# Patient Record
Sex: MALE | Race: OTHER | HISPANIC OR LATINO | URBAN - METROPOLITAN AREA
[De-identification: names, ages, dates, MRNs, and addresses within clinical notes are randomized per-mention and may not be internally consistent; named-entity substitution may affect disease eponyms.]

---

## 2017-05-16 ENCOUNTER — EMERGENCY (EMERGENCY)
Facility: HOSPITAL | Age: 27
LOS: 1 days | Discharge: HOME | End: 2017-05-16
Attending: EMERGENCY MEDICINE
Payer: COMMERCIAL

## 2017-05-16 VITALS
OXYGEN SATURATION: 98 % | TEMPERATURE: 99 F | WEIGHT: 154.98 LBS | HEIGHT: 63 IN | DIASTOLIC BLOOD PRESSURE: 133 MMHG | HEART RATE: 70 BPM | RESPIRATION RATE: 18 BRPM | SYSTOLIC BLOOD PRESSURE: 195 MMHG
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
## 2017-05-16 DIAGNOSIS — Z79.899 OTHER LONG TERM (CURRENT) DRUG THERAPY: ICD-10-CM

## 2017-05-16 DIAGNOSIS — S13.9XXA SPRAIN OF JOINTS AND LIGAMENTS OF UNSPECIFIED PARTS OF NECK, INITIAL ENCOUNTER: ICD-10-CM

## 2017-05-16 DIAGNOSIS — M54.2 CERVICALGIA: ICD-10-CM
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
## 2017-05-16 DIAGNOSIS — Z95.0 PRESENCE OF CARDIAC PACEMAKER: ICD-10-CM

## 2017-05-16 DIAGNOSIS — I10 ESSENTIAL (PRIMARY) HYPERTENSION: ICD-10-CM

## 2017-05-16 PROCEDURE — 99285 EMERGENCY DEPT VISIT HI MDM: CPT | Mod: 25

## 2017-05-16 PROCEDURE — 99053 MED SERV 10PM-8AM 24 HR FAC: CPT

## 2017-05-16 RX ORDER — SODIUM CHLORIDE 9 MG/ML
3 INJECTION INTRAMUSCULAR; INTRAVENOUS; SUBCUTANEOUS ONCE
Qty: 0 | Refills: 0 | Status: COMPLETED | OUTPATIENT
Start: 2017-05-16 | End: 2017-05-16

## 2017-05-16 RX ORDER — KETOROLAC TROMETHAMINE 30 MG/ML
15 SYRINGE (ML) INJECTION ONCE
Qty: 0 | Refills: 0 | Status: DISCONTINUED | OUTPATIENT
Start: 2017-05-16 | End: 2017-05-16

## 2017-05-16 RX ORDER — LABETALOL HCL 100 MG
20 TABLET ORAL ONCE
Qty: 0 | Refills: 0 | Status: COMPLETED | OUTPATIENT
Start: 2017-05-16 | End: 2017-05-16
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
## 2017-05-16 NOTE — ED PROVIDER NOTE - PROGRESS NOTE DETAILS
CT results and labs as noted.  BP improved with meds and pt feels well for d/c.  Pt instructed to take his meds as instructed

## 2017-05-16 NOTE — ED PROVIDER NOTE - NS ED MD SCRIBE ATTENDING SCRIBE SECTIONS
REVIEW OF SYSTEMS/PAST MEDICAL/SURGICAL/SOCIAL HISTORY/PHYSICAL EXAM/HISTORY OF PRESENT ILLNESS/INTAKE ASSESSMENT/SCREENINGS/DISPOSITION/HIV/VITAL SIGNS( Pullset)

## 2017-05-16 NOTE — ED PROVIDER NOTE - PHYSICAL EXAMINATION
c- collar does not fit pt's neck properly  secondary to pt having a chronic physical neck deformity.

## 2017-05-16 NOTE — ED PROVIDER NOTE - MUSCULOSKELETAL MINIMAL EXAM
+neck pain +pacemaker and defibrillator to chest wall. difficult to assess neck secondary to baseline neck deformity. No step off, no midline tenderness of neck. pelvis is intact. no francheska tenderness

## 2017-05-17 VITALS — DIASTOLIC BLOOD PRESSURE: 89 MMHG | HEART RATE: 62 BPM | SYSTOLIC BLOOD PRESSURE: 143 MMHG | RESPIRATION RATE: 18 BRPM

## 2017-05-17 LAB
ALBUMIN SERPL ELPH-MCNC: 4.3 G/DL — SIGNIFICANT CHANGE UP (ref 3.3–5.2)
ALP SERPL-CCNC: 38 U/L — LOW (ref 40–120)
ALT FLD-CCNC: 19 U/L — SIGNIFICANT CHANGE UP
ANION GAP SERPL CALC-SCNC: 13 MMOL/L — SIGNIFICANT CHANGE UP (ref 5–17)
AST SERPL-CCNC: 18 U/L — SIGNIFICANT CHANGE UP
BASOPHILS # BLD AUTO: 0 K/UL — SIGNIFICANT CHANGE UP (ref 0–0.2)
BASOPHILS NFR BLD AUTO: 0.3 % — SIGNIFICANT CHANGE UP (ref 0–2)
BILIRUB SERPL-MCNC: 0.4 MG/DL — SIGNIFICANT CHANGE UP (ref 0.4–2)
BUN SERPL-MCNC: 17 MG/DL — SIGNIFICANT CHANGE UP (ref 8–20)
CALCIUM SERPL-MCNC: 9.2 MG/DL — SIGNIFICANT CHANGE UP (ref 8.6–10.2)
CHLORIDE SERPL-SCNC: 99 MMOL/L — SIGNIFICANT CHANGE UP (ref 98–107)
CO2 SERPL-SCNC: 28 MMOL/L — SIGNIFICANT CHANGE UP (ref 22–29)
CREAT SERPL-MCNC: 0.91 MG/DL — SIGNIFICANT CHANGE UP (ref 0.5–1.3)
EOSINOPHIL # BLD AUTO: 0.1 K/UL — SIGNIFICANT CHANGE UP (ref 0–0.5)
EOSINOPHIL NFR BLD AUTO: 1.4 % — SIGNIFICANT CHANGE UP (ref 0–5)
GLUCOSE SERPL-MCNC: 108 MG/DL — SIGNIFICANT CHANGE UP (ref 70–115)
HCT VFR BLD CALC: 44.5 % — SIGNIFICANT CHANGE UP (ref 42–52)
HGB BLD-MCNC: 15 G/DL — SIGNIFICANT CHANGE UP (ref 14–18)
LYMPHOCYTES # BLD AUTO: 2.9 K/UL — SIGNIFICANT CHANGE UP (ref 1–4.8)
LYMPHOCYTES # BLD AUTO: 41.8 % — SIGNIFICANT CHANGE UP (ref 20–55)
MCHC RBC-ENTMCNC: 28.1 PG — SIGNIFICANT CHANGE UP (ref 27–31)
MCHC RBC-ENTMCNC: 33.7 G/DL — SIGNIFICANT CHANGE UP (ref 32–36)
MCV RBC AUTO: 83.5 FL — SIGNIFICANT CHANGE UP (ref 80–94)
MONOCYTES # BLD AUTO: 0.5 K/UL — SIGNIFICANT CHANGE UP (ref 0–0.8)
MONOCYTES NFR BLD AUTO: 6.9 % — SIGNIFICANT CHANGE UP (ref 3–10)
NEUTROPHILS # BLD AUTO: 3.4 K/UL — SIGNIFICANT CHANGE UP (ref 1.8–8)
NEUTROPHILS NFR BLD AUTO: 49.5 % — SIGNIFICANT CHANGE UP (ref 37–73)
PLATELET # BLD AUTO: 224 K/UL — SIGNIFICANT CHANGE UP (ref 150–400)
POTASSIUM SERPL-MCNC: 3.9 MMOL/L — SIGNIFICANT CHANGE UP (ref 3.5–5.3)
POTASSIUM SERPL-SCNC: 3.9 MMOL/L — SIGNIFICANT CHANGE UP (ref 3.5–5.3)
PROT SERPL-MCNC: 7.9 G/DL — SIGNIFICANT CHANGE UP (ref 6.6–8.7)
RBC # BLD: 5.33 M/UL — SIGNIFICANT CHANGE UP (ref 4.6–6.2)
RBC # FLD: 14 % — SIGNIFICANT CHANGE UP (ref 11–15.6)
SODIUM SERPL-SCNC: 140 MMOL/L — SIGNIFICANT CHANGE UP (ref 135–145)
WBC # BLD: 7 K/UL — SIGNIFICANT CHANGE UP (ref 4.8–10.8)
WBC # FLD AUTO: 7 K/UL — SIGNIFICANT CHANGE UP (ref 4.8–10.8)

## 2017-05-17 PROCEDURE — 96374 THER/PROPH/DIAG INJ IV PUSH: CPT

## 2017-05-17 PROCEDURE — 72125 CT NECK SPINE W/O DYE: CPT

## 2017-05-17 PROCEDURE — 85027 COMPLETE CBC AUTOMATED: CPT

## 2017-05-17 PROCEDURE — 96375 TX/PRO/DX INJ NEW DRUG ADDON: CPT

## 2017-05-17 PROCEDURE — 99284 EMERGENCY DEPT VISIT MOD MDM: CPT | Mod: 25

## 2017-05-17 PROCEDURE — 72125 CT NECK SPINE W/O DYE: CPT | Mod: 26

## 2017-05-17 PROCEDURE — 80053 COMPREHEN METABOLIC PANEL: CPT

## 2017-05-17 RX ORDER — HYDRALAZINE HCL 50 MG
10 TABLET ORAL ONCE
Qty: 0 | Refills: 0 | Status: COMPLETED | OUTPATIENT
Start: 2017-05-17 | End: 2017-05-17

## 2017-05-17 RX ORDER — METHOCARBAMOL 500 MG/1
1 TABLET, FILM COATED ORAL
Qty: 20 | Refills: 0 | OUTPATIENT
Start: 2017-05-17 | End: 2017-05-22

## 2017-05-17 RX ORDER — IBUPROFEN 200 MG
1 TABLET ORAL
Qty: 40 | Refills: 0 | OUTPATIENT
Start: 2017-05-17 | End: 2017-05-27

## 2017-05-17 RX ADMIN — Medication 20 MILLIGRAM(S): at 00:45

## 2017-05-17 RX ADMIN — Medication 0.2 MILLIGRAM(S): at 03:28

## 2017-05-17 RX ADMIN — SODIUM CHLORIDE 3 MILLILITER(S): 9 INJECTION INTRAMUSCULAR; INTRAVENOUS; SUBCUTANEOUS at 00:42

## 2017-05-17 RX ADMIN — Medication 15 MILLIGRAM(S): at 00:45

## 2017-05-17 RX ADMIN — Medication 10 MILLIGRAM(S): at 03:28

## 2017-05-17 NOTE — ED ADULT NURSE REASSESSMENT NOTE - NS ED NURSE REASSESS COMMENT FT1
patient non symptomatic, elevated BP, MDA multiple meds ordered, and given [id number] well, will continue to monitor for discharge
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
## 2017-05-25 ENCOUNTER — APPOINTMENT (OUTPATIENT)
Dept: CARDIOLOGY | Facility: CLINIC | Age: 27
End: 2017-05-25

## 2017-05-25 VITALS
DIASTOLIC BLOOD PRESSURE: 89 MMHG | SYSTOLIC BLOOD PRESSURE: 144 MMHG | WEIGHT: 156 LBS | HEIGHT: 62 IN | OXYGEN SATURATION: 98 % | BODY MASS INDEX: 28.71 KG/M2 | HEART RATE: 62 BPM

## 2017-05-28 ENCOUNTER — NON-APPOINTMENT (OUTPATIENT)
Age: 27
End: 2017-05-28

## 2017-05-31 ENCOUNTER — APPOINTMENT (OUTPATIENT)
Dept: CARDIOLOGY | Facility: CLINIC | Age: 27
End: 2017-05-31

## 2017-06-01 ENCOUNTER — APPOINTMENT (OUTPATIENT)
Dept: CARDIOLOGY | Facility: CLINIC | Age: 27
End: 2017-06-01

## 2017-07-27 ENCOUNTER — APPOINTMENT (OUTPATIENT)
Dept: CARDIOLOGY | Facility: CLINIC | Age: 27
End: 2017-07-27
Payer: MEDICAID

## 2017-07-27 VITALS — DIASTOLIC BLOOD PRESSURE: 102 MMHG | SYSTOLIC BLOOD PRESSURE: 146 MMHG

## 2017-07-27 VITALS — HEIGHT: 62 IN | BODY MASS INDEX: 28.16 KG/M2 | WEIGHT: 153 LBS

## 2017-07-27 VITALS — DIASTOLIC BLOOD PRESSURE: 119 MMHG | SYSTOLIC BLOOD PRESSURE: 175 MMHG

## 2017-07-27 VITALS — HEART RATE: 77 BPM | OXYGEN SATURATION: 93 % | DIASTOLIC BLOOD PRESSURE: 103 MMHG | SYSTOLIC BLOOD PRESSURE: 161 MMHG

## 2017-07-27 VITALS — SYSTOLIC BLOOD PRESSURE: 170 MMHG | DIASTOLIC BLOOD PRESSURE: 110 MMHG

## 2017-07-27 PROCEDURE — 93000 ELECTROCARDIOGRAM COMPLETE: CPT

## 2017-07-27 PROCEDURE — 99214 OFFICE O/P EST MOD 30 MIN: CPT | Mod: 25

## 2017-07-27 RX ORDER — WARFARIN 1 MG/1
1 TABLET ORAL
Qty: 30 | Refills: 0 | Status: DISCONTINUED | COMMUNITY
Start: 2017-07-07

## 2017-09-03 ENCOUNTER — NON-APPOINTMENT (OUTPATIENT)
Age: 27
End: 2017-09-03

## 2017-11-30 ENCOUNTER — INPATIENT (INPATIENT)
Facility: HOSPITAL | Age: 27
LOS: 2 days | Discharge: HOME | DRG: 193 | End: 2017-12-03
Attending: HOSPITALIST | Admitting: HOSPITALIST
Payer: MEDICAID

## 2017-11-30 VITALS
RESPIRATION RATE: 18 BRPM | HEART RATE: 73 BPM | SYSTOLIC BLOOD PRESSURE: 146 MMHG | HEIGHT: 62 IN | TEMPERATURE: 98 F | WEIGHT: 160.06 LBS | DIASTOLIC BLOOD PRESSURE: 94 MMHG | OXYGEN SATURATION: 92 %

## 2017-11-30 DIAGNOSIS — J18.9 PNEUMONIA, UNSPECIFIED ORGANISM: ICD-10-CM

## 2017-11-30 DIAGNOSIS — J96.01 ACUTE RESPIRATORY FAILURE WITH HYPOXIA: ICD-10-CM

## 2017-11-30 DIAGNOSIS — I10 ESSENTIAL (PRIMARY) HYPERTENSION: ICD-10-CM

## 2017-11-30 DIAGNOSIS — I48.91 UNSPECIFIED ATRIAL FIBRILLATION: ICD-10-CM

## 2017-11-30 DIAGNOSIS — J18.1 LOBAR PNEUMONIA, UNSPECIFIED ORGANISM: ICD-10-CM

## 2017-11-30 DIAGNOSIS — Z98.890 OTHER SPECIFIED POSTPROCEDURAL STATES: Chronic | ICD-10-CM

## 2017-11-30 DIAGNOSIS — R07.89 OTHER CHEST PAIN: ICD-10-CM

## 2017-11-30 LAB
ALBUMIN SERPL ELPH-MCNC: 4.3 G/DL — SIGNIFICANT CHANGE UP (ref 3.3–5.2)
ALP SERPL-CCNC: 47 U/L — SIGNIFICANT CHANGE UP (ref 40–120)
ALT FLD-CCNC: 15 U/L — SIGNIFICANT CHANGE UP
ANION GAP SERPL CALC-SCNC: 15 MMOL/L — SIGNIFICANT CHANGE UP (ref 5–17)
APTT BLD: 56.5 SEC — HIGH (ref 27.5–37.4)
AST SERPL-CCNC: 20 U/L — SIGNIFICANT CHANGE UP
BASOPHILS # BLD AUTO: 0 K/UL — SIGNIFICANT CHANGE UP (ref 0–0.2)
BASOPHILS NFR BLD AUTO: 0.2 % — SIGNIFICANT CHANGE UP (ref 0–2)
BILIRUB SERPL-MCNC: 0.5 MG/DL — SIGNIFICANT CHANGE UP (ref 0.4–2)
BLOOD GAS COMMENTS ARTERIAL: SIGNIFICANT CHANGE UP
BUN SERPL-MCNC: 14 MG/DL — SIGNIFICANT CHANGE UP (ref 8–20)
CALCIUM SERPL-MCNC: 9.3 MG/DL — SIGNIFICANT CHANGE UP (ref 8.6–10.2)
CHLORIDE SERPL-SCNC: 97 MMOL/L — LOW (ref 98–107)
CK MB CFR SERPL CALC: 2.1 NG/ML — SIGNIFICANT CHANGE UP (ref 0–6.7)
CK SERPL-CCNC: 164 U/L — SIGNIFICANT CHANGE UP (ref 30–200)
CO2 SERPL-SCNC: 26 MMOL/L — SIGNIFICANT CHANGE UP (ref 22–29)
CREAT SERPL-MCNC: 0.92 MG/DL — SIGNIFICANT CHANGE UP (ref 0.5–1.3)
EOSINOPHIL # BLD AUTO: 0.1 K/UL — SIGNIFICANT CHANGE UP (ref 0–0.5)
EOSINOPHIL NFR BLD AUTO: 1.6 % — SIGNIFICANT CHANGE UP (ref 0–5)
GAS PNL BLDA: SIGNIFICANT CHANGE UP
GAS PNL BLDA: SIGNIFICANT CHANGE UP
GLUCOSE SERPL-MCNC: 87 MG/DL — SIGNIFICANT CHANGE UP (ref 70–115)
HCO3 BLDA-SCNC: 25 MMOL/L — SIGNIFICANT CHANGE UP (ref 20–26)
HCO3 BLDA-SCNC: 25 MMOL/L — SIGNIFICANT CHANGE UP (ref 20–26)
HCT VFR BLD CALC: 44 % — SIGNIFICANT CHANGE UP (ref 42–52)
HGB BLD-MCNC: 15.6 G/DL — SIGNIFICANT CHANGE UP (ref 14–18)
HOROWITZ INDEX BLDA+IHG-RTO: SIGNIFICANT CHANGE UP
INR BLD: 3.23 RATIO — HIGH (ref 0.88–1.16)
LACTATE BLDV-MCNC: 0.7 MMOL/L — SIGNIFICANT CHANGE UP (ref 0.5–2)
LYMPHOCYTES # BLD AUTO: 2.4 K/UL — SIGNIFICANT CHANGE UP (ref 1–4.8)
LYMPHOCYTES # BLD AUTO: 30.3 % — SIGNIFICANT CHANGE UP (ref 20–55)
MCHC RBC-ENTMCNC: 29.9 PG — SIGNIFICANT CHANGE UP (ref 27–31)
MCHC RBC-ENTMCNC: 35.5 G/DL — SIGNIFICANT CHANGE UP (ref 32–36)
MCV RBC AUTO: 84.5 FL — SIGNIFICANT CHANGE UP (ref 80–94)
MONOCYTES # BLD AUTO: 0.7 K/UL — SIGNIFICANT CHANGE UP (ref 0–0.8)
MONOCYTES NFR BLD AUTO: 8.5 % — SIGNIFICANT CHANGE UP (ref 3–10)
NEUTROPHILS # BLD AUTO: 4.8 K/UL — SIGNIFICANT CHANGE UP (ref 1.8–8)
NEUTROPHILS NFR BLD AUTO: 59.2 % — SIGNIFICANT CHANGE UP (ref 37–73)
PCO2 BLDA: 40 MMHG — SIGNIFICANT CHANGE UP (ref 35–45)
PCO2 BLDA: 56 MMHG — HIGH (ref 35–45)
PH BLDA: 7.33 — LOW (ref 7.35–7.45)
PH BLDA: 7.41 — SIGNIFICANT CHANGE UP (ref 7.35–7.45)
PLATELET # BLD AUTO: 265 K/UL — SIGNIFICANT CHANGE UP (ref 150–400)
PO2 BLDA: 42 MMHG — CRITICAL LOW (ref 83–108)
PO2 BLDA: 68 MMHG — LOW (ref 83–108)
POTASSIUM SERPL-MCNC: 4.1 MMOL/L — SIGNIFICANT CHANGE UP (ref 3.5–5.3)
POTASSIUM SERPL-SCNC: 4.1 MMOL/L — SIGNIFICANT CHANGE UP (ref 3.5–5.3)
PROT SERPL-MCNC: 8.2 G/DL — SIGNIFICANT CHANGE UP (ref 6.6–8.7)
PROTHROM AB SERPL-ACNC: 36.4 SEC — HIGH (ref 9.8–12.7)
RAPID RVP RESULT: SIGNIFICANT CHANGE UP
RBC # BLD: 5.21 M/UL — SIGNIFICANT CHANGE UP (ref 4.6–6.2)
RBC # FLD: 13.2 % — SIGNIFICANT CHANGE UP (ref 11–15.6)
SAO2 % BLDA: 74 % — LOW (ref 95–99)
SAO2 % BLDA: 94 % — LOW (ref 95–99)
SODIUM SERPL-SCNC: 138 MMOL/L — SIGNIFICANT CHANGE UP (ref 135–145)
TROPONIN T SERPL-MCNC: <0.01 NG/ML — SIGNIFICANT CHANGE UP (ref 0–0.06)
TROPONIN T SERPL-MCNC: <0.01 NG/ML — SIGNIFICANT CHANGE UP (ref 0–0.06)
WBC # BLD: 8 K/UL — SIGNIFICANT CHANGE UP (ref 4.8–10.8)
WBC # FLD AUTO: 8 K/UL — SIGNIFICANT CHANGE UP (ref 4.8–10.8)

## 2017-11-30 PROCEDURE — 99285 EMERGENCY DEPT VISIT HI MDM: CPT

## 2017-11-30 PROCEDURE — 99223 1ST HOSP IP/OBS HIGH 75: CPT | Mod: GC

## 2017-11-30 PROCEDURE — 71010: CPT | Mod: 26

## 2017-11-30 RX ORDER — WARFARIN SODIUM 2.5 MG/1
0 TABLET ORAL
Qty: 0 | Refills: 0 | COMMUNITY

## 2017-11-30 RX ORDER — AZITHROMYCIN 500 MG/1
500 TABLET, FILM COATED ORAL ONCE
Qty: 0 | Refills: 0 | Status: COMPLETED | OUTPATIENT
Start: 2017-11-30 | End: 2017-11-30

## 2017-11-30 RX ORDER — CEFTRIAXONE 500 MG/1
1 INJECTION, POWDER, FOR SOLUTION INTRAMUSCULAR; INTRAVENOUS ONCE
Qty: 0 | Refills: 0 | Status: COMPLETED | OUTPATIENT
Start: 2017-11-30 | End: 2017-11-30

## 2017-11-30 RX ORDER — ALBUTEROL 90 UG/1
2.5 AEROSOL, METERED ORAL EVERY 6 HOURS
Qty: 0 | Refills: 0 | Status: DISCONTINUED | OUTPATIENT
Start: 2017-11-30 | End: 2017-12-03

## 2017-11-30 RX ORDER — ACETAMINOPHEN 500 MG
650 TABLET ORAL EVERY 6 HOURS
Qty: 0 | Refills: 0 | Status: DISCONTINUED | OUTPATIENT
Start: 2017-11-30 | End: 2017-12-03

## 2017-11-30 RX ORDER — CARVEDILOL PHOSPHATE 80 MG/1
6.25 CAPSULE, EXTENDED RELEASE ORAL EVERY 12 HOURS
Qty: 0 | Refills: 0 | Status: DISCONTINUED | OUTPATIENT
Start: 2017-11-30 | End: 2017-12-01

## 2017-11-30 RX ORDER — ASPIRIN/CALCIUM CARB/MAGNESIUM 324 MG
81 TABLET ORAL DAILY
Qty: 0 | Refills: 0 | Status: DISCONTINUED | OUTPATIENT
Start: 2017-11-30 | End: 2017-12-03

## 2017-11-30 RX ADMIN — CARVEDILOL PHOSPHATE 6.25 MILLIGRAM(S): 80 CAPSULE, EXTENDED RELEASE ORAL at 23:21

## 2017-11-30 RX ADMIN — CEFTRIAXONE 100 GRAM(S): 500 INJECTION, POWDER, FOR SOLUTION INTRAMUSCULAR; INTRAVENOUS at 19:29

## 2017-11-30 RX ADMIN — AZITHROMYCIN 255 MILLIGRAM(S): 500 TABLET, FILM COATED ORAL at 19:30

## 2017-11-30 RX ADMIN — Medication 81 MILLIGRAM(S): at 23:21

## 2017-11-30 NOTE — H&P ADULT - NSHPPHYSICALEXAM_GEN_ALL_CORE
Vital Signs Last 24 Hrs  T(C): 36.4 (30 Nov 2017 21:12), Max: 36.4 (30 Nov 2017 13:49)  T(F): 97.5 (30 Nov 2017 21:12), Max: 97.5 (30 Nov 2017 13:49)  HR: 77 (30 Nov 2017 21:12) (73 - 77)  BP: 154/102 (30 Nov 2017 21:12) (146/94 - 154/112)  RR: 18 (30 Nov 2017 21:12) (18 - 18)  SpO2: 99% (30 Nov 2017 21:12) (92% - 99%)      PHYSICAL EXAM: Vital signs reviewed.  GENERAL: Appears well developed, well nourished alert and cooperative.  HEENT: Head; normocephalic, atraumatic, PERRLA, EOMI  NECK: Supple, no JVD or carotid bruit or thyromegaly.  CARDIOVASCULAR: Normal S1 and S2, No murmur, RRR. No edema  RESPIRATORY: No rales, rhonchi or wheeze. Normal breath sounds bilaterally.  GASTROINTESTINAL: Soft, nontender without mass or organomegaly. Nl BS  EXTREMITIES: No clubbing, cyanosis or edema. No calf tenderness. Distal pulses wnl.   MUSCULOSKELETAL: 5/5 muscle strength in UE and LE.   SKIN: warm and dry with normal turgor.  NEURO: Alert/oriented x 3/normal motor exam. CN 2-12 intact. No pathologic reflexes.    PSYCH: normal affect. Vital Signs Last 24 Hrs  T(C): 36.4 (30 Nov 2017 21:12), Max: 36.4 (30 Nov 2017 13:49)  T(F): 97.5 (30 Nov 2017 21:12), Max: 97.5 (30 Nov 2017 13:49)  HR: 77 (30 Nov 2017 21:12) (73 - 77)  BP: 154/102 (30 Nov 2017 21:12) (146/94 - 154/112)  RR: 18 (30 Nov 2017 21:12) (18 - 18)  SpO2: 99% (30 Nov 2017 21:12) (92% - 99%)      PHYSICAL EXAM: Vital signs reviewed.  GENERAL: Appears well developed.   HEENT: PERRLA, EOMI. Facial dyssymmetry noted. Left iris with white patchiness. + Dysarthria  NECK: Supple, no JVD. Tracheostomy site now closed with scar noted.  CARDIOVASCULAR: S1 and S2, irregular, No murmur. No edema. AICD in place over left chest.   RESPIRATORY: No rales, rhonchi or wheeze. Normal breath sounds bilaterally.  GASTROINTESTINAL: Soft, nontender. Mid circular abdominal scar noted.   EXTREMITIES: No cyanosis or edema. No calf tenderness. Distal pulses wnl.   MUSCULOSKELETAL: 5/5 muscle strength in UE and LE.   SKIN: warm and dry with normal turgor.  NEURO: Alert/oriented x 3/normal motor exam. + dysarthria. 5/5 muscle strength. No loss of sensation  PSYCH: normal affect.

## 2017-11-30 NOTE — ED PROVIDER NOTE - PMH
Atrial fibrillation, unspecified type    CHF (congestive heart failure)    CVA (cerebral vascular accident)    HTN (hypertension)    TIA (transient ischemic attack)

## 2017-11-30 NOTE — H&P ADULT - PROBLEM SELECTOR PLAN 3
-likely pleuritic in nature given active pneumonia however given exstensive cardiac history cannot exclude cardiac etiology at this time.   -Will admit to monitored bed, will downgrade when appropriate  -EKG reviewed, no previous EKG available, T wave inversions evident in inferolateral leads  -serial troponin, first set <0.01 -likely pleuritic in nature given active pneumonia however given exstensive cardiac history cannot exclude cardiac etiology at this time.   -Will admit to monitored bed, will downgrade when appropriate  -EKG reviewed, no previous EKG available, T wave inversions evident in inferolateral leads  -serial troponin, first set <0.01  -will consider cardiology consult pending course- Pt has seen [facility] cardiology in past -likely pleuritic in nature given active pneumonia however given extensive cardiac history cannot exclude cardiac etiology at this time.   -Will admit to monitored bed, will downgrade when appropriate  -EKG reviewed, no previous EKG available, T wave inversions evident in inferolateral leads  -serial troponin, first set and second set <0.01  -will consider cardiology consult pending course- Pt has seen [facility] cardiology in past

## 2017-11-30 NOTE — H&P ADULT - NSHPLABSRESULTS_GEN_ALL_CORE
Labs:                        15.6   8.0   )-----------( 265      ( 30 Nov 2017 17:09 )             44.0   11-30    138  |  97<L>  |  14.0  ----------------------------<  87  4.1   |  26.0  |  0.92    Ca    9.3      30 Nov 2017 17:09    TPro  8.2  /  Alb  4.3  /  TBili  0.5  /  DBili  x   /  AST  20  /  ALT  15  /  AlkPhos  47  11-30        Radiology: Images reviewed by me.     EXAM:  CHEST SINGLE VIEW FRONTAL                          PROCEDURE DATE:  11/30/2017          INTERPRETATION:  TECHNIQUE: Single portable view of the chest.    COMPARISON: 10/28/2012    CLINICAL HISTORY: Cough, rule out infiltrate.     FINDINGS:    Single frontal view of the chest demonstrates possible early right lower   lobe infiltrate. Left-sided AICD. The cardiomediastinal silhouette is   enlarged. No acute osseous abnormalities. Overlying EKG leads and wires   are noted. Low lung volumes.    IMPRESSION: Possible early right lower lobe infiltrate.          RVP- NEg Labs:                        15.6   8.0   )-----------( 265      ( 30 Nov 2017 17:09 )             44.0   11-30    138  |  97<L>  |  14.0  ----------------------------<  87  4.1   |  26.0  |  0.92    Ca    9.3      30 Nov 2017 17:09    TPro  8.2  /  Alb  4.3  /  TBili  0.5  /  DBili  x   /  AST  20  /  ALT  15  /  AlkPhos  47  11-30        Radiology: Images reviewed by me.     EXAM:  CHEST SINGLE VIEW FRONTAL                          PROCEDURE DATE:  11/30/2017          INTERPRETATION:  TECHNIQUE: Single portable view of the chest.    COMPARISON: 10/28/2012    CLINICAL HISTORY: Cough, rule out infiltrate.     FINDINGS:    Single frontal view of the chest demonstrates possible early right lower   lobe infiltrate. Left-sided AICD. The cardiomediastinal silhouette is   enlarged. No acute osseous abnormalities. Overlying EKG leads and wires   are noted. Low lung volumes.    IMPRESSION: Possible early right lower lobe infiltrate.      EKG done and shows T wave abnormality in leads V5, lead 2. Inverted T wave in lead 2.    RVP- NEg

## 2017-11-30 NOTE — H&P ADULT - HISTORY OF PRESENT ILLNESS
Pt is 26y/o M with hx of CVA in 2013, Afib, CHF, HTN, AICD last interrogated a year ago who presents with SOB and cough. Pt says he started feeling SOB about eight to nine days ago. Pt reports SOB with exertion. He says he only feels SOB when he ambulates but denies feeling SOB at rest. He says SOB got progressively worse leading him to come into the E.R. Pt also c/o cough productive with yellowish sputum. He says he has recently been non compliant with his diet and has been eating more junk food in past 5 months. He denies any chest pain, palpitation, fever, abdominal pain, PND, Orthopnea, recent illness, calf tenderness, blood in stool, ill contact or recent travel. Pt has hx of tracheostomy now reversed after his stroke in 2013. Pt is 26y/o M with hx of CVA in 2013, Afib, CHF, HTN, AICD last interrogated a year ago who presents with SOB and cough. Pt says he started feeling SOB about eight to nine days ago. Pt reports SOB with exertion. He says he only feels SOB when he ambulates but denies feeling SOB at rest. He says SOB got progressively worse leading him to come into the E.R. Pt also c/o cough productive with yellowish sputum. He says he has recently been non compliant with his diet and has been eating more junk food in past 5 months. He denies any chest pain, palpitation, fever, abdominal pain, PND, Orthopnea, recent illness, calf tenderness, blood in stool, ill contact or recent travel. Pt has hx of tracheostomy now reversed after his stroke in 2013. Pt reports compliance with his medication. Pt is 26y/o M with hx of CVA in 2013, Afib, CHF, HTN, AICD last interrogated a year ago who presents with SOB and cough. Pt says he started feeling SOB about eight to nine days ago. Pt reports SOB with exertion. He says he only feels SOB when he ambulates but denies feeling SOB at rest. He says SOB got progressively worse leading him to come into the E.R. Pt also c/o cough productive with yellowish sputum. He says he has recently been non compliant with his diet and has been eating more junk food in past 5 months. Pt also c/o chest pain at time of presentation which he says was sharp, 6/10, non radiating, located in mid chest, non radiating, not relieved by anything and with no relationship to food. He denies any current chest pain, palpitation, fever, abdominal pain, PND, Orthopnea, recent illness, calf tenderness, blood in stool, ill contact or recent travel. Pt has hx of tracheostomy now reversed after his stroke in 2013. Pt reports compliance with his medication.

## 2017-11-30 NOTE — H&P ADULT - PROBLEM SELECTOR PLAN 2
-cont rocephin/zithromax  -albuterol via nebulization q 6 hrs  -f/u RVP panel  -f/u legionella urine antigen   -chest x ray reviewed, RLL inflitrate seen   -incentive spirometry while awake -Continue rocephin 1G Q24 hrs, zithromax 500mg Q24 hours  -albuterol via nebulization q 6 hrs  -RVP panel negative  -f/u legionella urine antigen   -chest x ray reviewed, RLL inflitrate seen   -incentive spirometry while awake

## 2017-11-30 NOTE — H&P ADULT - PROBLEM SELECTOR PLAN 1
-as evidenced by ABG- on repeat hypoxia and acidosis resolved  -pt now with VSS  -admit to residents service to monitored bed+ given extensive cardiac history with c/o chest pain and hypoxia on presentation  -O2 Via NC prn to keep POx>93%  -ambulate as tolerated  -DASH/TLC diet  -ICD boots while in bed for DVT prophylaxis-pt already therapeutically anticoagulated on coumadin -as evidenced by ABG- on repeat hypoxia and acidosis resolved  -pt now with VSS  -admit to residents service to  under monitored bed+ given extensive cardiac history with c/o chest pain and hypoxia on presentation  -O2 Via NC prn to keep POx>93%  -ambulate as tolerated  -DASH/TLC diet  -ICD boots while in bed for DVT prophylaxis-pt already therapeutically anticoagulated on coumadin  - CBC, CMP, PT/INR, Troponin neg times 2, done in E.D  -F/U Am labs ordered.

## 2017-11-30 NOTE — ED PROVIDER NOTE - OBJECTIVE STATEMENT
26 y/o male with a h/o HTN  afib and s/p cva and chf had trach 2 years ago removed and he says he has been sob for the past 8 days with cough productive of some yellow phlegm and he went to his pmd who told him he might have pneumonia and to go to [facility] pt says he had chills several days ago but they resolved no fever

## 2017-11-30 NOTE — H&P ADULT - PROBLEM SELECTOR PLAN 4
-cont AC  -pt rate controlled -cont AC with coumadin. Hold dose tonight given supertherapeutic level.   -Restart Ac tomorrow.  -pt rate controlled. Continue Coreg 6.25 BID

## 2017-11-30 NOTE — H&P ADULT - ASSESSMENT
Pt is 28y/o M with hx of CVA in 2013, Afib, CHF, HTN who presents to the E.D with SOB and admitted for Respiratory distress secondary to pneumonia.

## 2017-11-30 NOTE — ED ADULT NURSE NOTE - OBJECTIVE STATEMENT
received pt AOx3 Sent from PMD for SOB x 8 days. C/O cough and subjective fevers. pt states takes coumadin. Denies swelling to peripheral extremities. C/O chest pain and dizziness earlier but denies symptoms upon assessment. MAEx4, neuro intact/baseline. resp even unlabored. will cont to monitor.

## 2017-11-30 NOTE — ED ADULT TRIAGE NOTE - CHIEF COMPLAINT QUOTE
Sent from PMD for SOB x 8 days. C/O cough and subjective fevers. States compliant with coumadin. Denies swelling to peripheral extremities. C/O chest pain and dizziness.

## 2017-11-30 NOTE — H&P ADULT - PROBLEM SELECTOR PLAN 6
Pt supertherapeutic. Restart Coumadin tomorrow. F/u PT/INR in AM.   Will order VCD boot while at rest.

## 2017-12-01 ENCOUNTER — OUTPATIENT (OUTPATIENT)
Dept: OUTPATIENT SERVICES | Facility: HOSPITAL | Age: 27
LOS: 1 days | End: 2017-12-01
Payer: MEDICAID

## 2017-12-01 DIAGNOSIS — Z29.9 ENCOUNTER FOR PROPHYLACTIC MEASURES, UNSPECIFIED: ICD-10-CM

## 2017-12-01 DIAGNOSIS — Z98.890 OTHER SPECIFIED POSTPROCEDURAL STATES: Chronic | ICD-10-CM

## 2017-12-01 LAB
ALBUMIN SERPL ELPH-MCNC: 4.1 G/DL — SIGNIFICANT CHANGE UP (ref 3.3–5.2)
ALP SERPL-CCNC: 44 U/L — SIGNIFICANT CHANGE UP (ref 40–120)
ALT FLD-CCNC: 15 U/L — SIGNIFICANT CHANGE UP
ANION GAP SERPL CALC-SCNC: 14 MMOL/L — SIGNIFICANT CHANGE UP (ref 5–17)
APTT BLD: 62.5 SEC — HIGH (ref 27.5–37.4)
AST SERPL-CCNC: 21 U/L — SIGNIFICANT CHANGE UP
BASOPHILS # BLD AUTO: 0 K/UL — SIGNIFICANT CHANGE UP (ref 0–0.2)
BASOPHILS NFR BLD AUTO: 0.3 % — SIGNIFICANT CHANGE UP (ref 0–2)
BILIRUB SERPL-MCNC: 0.5 MG/DL — SIGNIFICANT CHANGE UP (ref 0.4–2)
BUN SERPL-MCNC: 17 MG/DL — SIGNIFICANT CHANGE UP (ref 8–20)
CALCIUM SERPL-MCNC: 8.8 MG/DL — SIGNIFICANT CHANGE UP (ref 8.6–10.2)
CHLORIDE SERPL-SCNC: 97 MMOL/L — LOW (ref 98–107)
CO2 SERPL-SCNC: 25 MMOL/L — SIGNIFICANT CHANGE UP (ref 22–29)
CREAT SERPL-MCNC: 0.89 MG/DL — SIGNIFICANT CHANGE UP (ref 0.5–1.3)
D DIMER BLD IA.RAPID-MCNC: <150 NG/ML DDU — SIGNIFICANT CHANGE UP
EOSINOPHIL # BLD AUTO: 0.1 K/UL — SIGNIFICANT CHANGE UP (ref 0–0.5)
EOSINOPHIL NFR BLD AUTO: 1.7 % — SIGNIFICANT CHANGE UP (ref 0–5)
GLUCOSE SERPL-MCNC: 105 MG/DL — SIGNIFICANT CHANGE UP (ref 70–115)
HCT VFR BLD CALC: 43.1 % — SIGNIFICANT CHANGE UP (ref 42–52)
HGB BLD-MCNC: 14.8 G/DL — SIGNIFICANT CHANGE UP (ref 14–18)
INR BLD: 4.46 RATIO — HIGH (ref 0.88–1.16)
LYMPHOCYTES # BLD AUTO: 2.5 K/UL — SIGNIFICANT CHANGE UP (ref 1–4.8)
LYMPHOCYTES # BLD AUTO: 32.2 % — SIGNIFICANT CHANGE UP (ref 20–55)
MAGNESIUM SERPL-MCNC: 2.1 MG/DL — SIGNIFICANT CHANGE UP (ref 1.6–2.6)
MCHC RBC-ENTMCNC: 27.8 PG — SIGNIFICANT CHANGE UP (ref 27–31)
MCHC RBC-ENTMCNC: 34.3 G/DL — SIGNIFICANT CHANGE UP (ref 32–36)
MCV RBC AUTO: 81 FL — SIGNIFICANT CHANGE UP (ref 80–94)
MONOCYTES # BLD AUTO: 0.6 K/UL — SIGNIFICANT CHANGE UP (ref 0–0.8)
MONOCYTES NFR BLD AUTO: 7 % — SIGNIFICANT CHANGE UP (ref 3–10)
NEUTROPHILS # BLD AUTO: 4.6 K/UL — SIGNIFICANT CHANGE UP (ref 1.8–8)
NEUTROPHILS NFR BLD AUTO: 58.5 % — SIGNIFICANT CHANGE UP (ref 37–73)
PLATELET # BLD AUTO: 177 K/UL — SIGNIFICANT CHANGE UP (ref 150–400)
POTASSIUM SERPL-MCNC: 4 MMOL/L — SIGNIFICANT CHANGE UP (ref 3.5–5.3)
POTASSIUM SERPL-SCNC: 4 MMOL/L — SIGNIFICANT CHANGE UP (ref 3.5–5.3)
PROCALCITONIN SERPL-MCNC: <0.05 NG/ML — SIGNIFICANT CHANGE UP (ref 0–0.04)
PROT SERPL-MCNC: 7.7 G/DL — SIGNIFICANT CHANGE UP (ref 6.6–8.7)
PROTHROM AB SERPL-ACNC: 50.6 SEC — HIGH (ref 9.8–12.7)
RBC # BLD: 5.32 M/UL — SIGNIFICANT CHANGE UP (ref 4.6–6.2)
RBC # FLD: 13.5 % — SIGNIFICANT CHANGE UP (ref 11–15.6)
SODIUM SERPL-SCNC: 136 MMOL/L — SIGNIFICANT CHANGE UP (ref 135–145)
WBC # BLD: 7.8 K/UL — SIGNIFICANT CHANGE UP (ref 4.8–10.8)
WBC # FLD AUTO: 7.8 K/UL — SIGNIFICANT CHANGE UP (ref 4.8–10.8)

## 2017-12-01 PROCEDURE — 71250 CT THORAX DX C-: CPT | Mod: 26

## 2017-12-01 PROCEDURE — G9001: CPT

## 2017-12-01 PROCEDURE — 99233 SBSQ HOSP IP/OBS HIGH 50: CPT | Mod: GC

## 2017-12-01 RX ORDER — CEFTRIAXONE 500 MG/1
1 INJECTION, POWDER, FOR SOLUTION INTRAMUSCULAR; INTRAVENOUS EVERY 24 HOURS
Qty: 0 | Refills: 0 | Status: DISCONTINUED | OUTPATIENT
Start: 2017-11-30 | End: 2017-12-02

## 2017-12-01 RX ORDER — CARVEDILOL PHOSPHATE 80 MG/1
1 CAPSULE, EXTENDED RELEASE ORAL
Qty: 0 | Refills: 0 | COMMUNITY

## 2017-12-01 RX ORDER — AZITHROMYCIN 500 MG/1
500 TABLET, FILM COATED ORAL EVERY 24 HOURS
Qty: 0 | Refills: 0 | Status: DISCONTINUED | OUTPATIENT
Start: 2017-11-30 | End: 2017-12-02

## 2017-12-01 RX ORDER — CARVEDILOL PHOSPHATE 80 MG/1
12.5 CAPSULE, EXTENDED RELEASE ORAL EVERY 12 HOURS
Qty: 0 | Refills: 0 | Status: DISCONTINUED | OUTPATIENT
Start: 2017-12-01 | End: 2017-12-03

## 2017-12-01 RX ORDER — TRIAMTERENE/HYDROCHLOROTHIAZID 75 MG-50MG
1 TABLET ORAL DAILY
Qty: 0 | Refills: 0 | Status: DISCONTINUED | OUTPATIENT
Start: 2017-12-01 | End: 2017-12-03

## 2017-12-01 RX ORDER — SACCHAROMYCES BOULARDII 250 MG
250 POWDER IN PACKET (EA) ORAL
Qty: 0 | Refills: 0 | Status: DISCONTINUED | OUTPATIENT
Start: 2017-11-30 | End: 2017-12-02

## 2017-12-01 RX ORDER — QUINAPRIL HYDROCHLORIDE 40 MG/1
0 TABLET, FILM COATED ORAL
Qty: 0 | Refills: 0 | COMMUNITY

## 2017-12-01 RX ORDER — INFLUENZA VIRUS VACCINE 15; 15; 15; 15 UG/.5ML; UG/.5ML; UG/.5ML; UG/.5ML
0.5 SUSPENSION INTRAMUSCULAR ONCE
Qty: 0 | Refills: 0 | Status: DISCONTINUED | OUTPATIENT
Start: 2017-12-01 | End: 2017-12-03

## 2017-12-01 RX ADMIN — CARVEDILOL PHOSPHATE 12.5 MILLIGRAM(S): 80 CAPSULE, EXTENDED RELEASE ORAL at 05:07

## 2017-12-01 RX ADMIN — Medication 250 MILLIGRAM(S): at 05:07

## 2017-12-01 RX ADMIN — ALBUTEROL 2.5 MILLIGRAM(S): 90 AEROSOL, METERED ORAL at 03:06

## 2017-12-01 RX ADMIN — Medication 81 MILLIGRAM(S): at 14:09

## 2017-12-01 RX ADMIN — Medication 250 MILLIGRAM(S): at 17:40

## 2017-12-01 RX ADMIN — CARVEDILOL PHOSPHATE 12.5 MILLIGRAM(S): 80 CAPSULE, EXTENDED RELEASE ORAL at 17:41

## 2017-12-01 RX ADMIN — ALBUTEROL 2.5 MILLIGRAM(S): 90 AEROSOL, METERED ORAL at 10:42

## 2017-12-01 RX ADMIN — ALBUTEROL 2.5 MILLIGRAM(S): 90 AEROSOL, METERED ORAL at 20:32

## 2017-12-01 RX ADMIN — Medication 1 TABLET(S): at 05:07

## 2017-12-01 RX ADMIN — CEFTRIAXONE 100 GRAM(S): 500 INJECTION, POWDER, FOR SOLUTION INTRAMUSCULAR; INTRAVENOUS at 04:58

## 2017-12-01 RX ADMIN — AZITHROMYCIN 255 MILLIGRAM(S): 500 TABLET, FILM COATED ORAL at 05:40

## 2017-12-01 RX ADMIN — ALBUTEROL 2.5 MILLIGRAM(S): 90 AEROSOL, METERED ORAL at 15:59

## 2017-12-01 NOTE — PROGRESS NOTE ADULT - PROBLEM SELECTOR PLAN 4
-cont AC with coumadin. Hold dose tonight given supertherapeutic level.   -Restart Ac tomorrow.  -pt rate controlled. Continue Coreg 6.25 BID -cont AC with coumadin. Hold dose tonight given supertherapeutic level.   -Restart Ac tomorrow.  -pt rate controlled. Continue Coreg 12.5 BID

## 2017-12-01 NOTE — PROGRESS NOTE ADULT - SUBJECTIVE AND OBJECTIVE BOX
Patient is a 27y Male  admitted with chief complaint of Patient is a 27y old  Male who presents with a chief complaint of SOB, cough (30 Nov 2017 20:02)    Patient seen and examined at bedside, No acute overnight events. Today pt says SOB is improved. He continue to have cough. Pt says chest pain is resolved. He denies fever, palpitations, abdominal pain, diarrhea, constipation, calf pain.  Patient ambulating, eating well, voiding and last BM_.  Cardiac monitor reviewed; No acute overnight event    ROS: Neg except as above.    Vital Signs Last 24 Hrs  T(C): 36.8 (01 Dec 2017 04:48), Max: 37.1 (01 Dec 2017 02:27)  T(F): 98.2 (01 Dec 2017 04:48), Max: 98.7 (01 Dec 2017 02:27)  HR: 64 (01 Dec 2017 04:48) (64 - 77)  BP: 126/94 (01 Dec 2017 04:48) (126/94 - 154/112)  RR: 18 (01 Dec 2017 04:48) (18 - 18)  SpO2: 99% (01 Dec 2017 04:48) (92% - 99%)      PHYSICAL EXAM: Vital signs reviewed.  PHYSICAL EXAM: Vital signs reviewed.  GENERAL: Appears well developed.   HEENT: PERRLA, EOMI. Facial dyssymmetry noted. Left iris with white patchiness. + Dysarthria  NECK: Supple, no JVD. Tracheostomy site now closed with scar noted.  CARDIOVASCULAR: S1 and S2, irregular, No murmur. No edema. AICD in place over left chest.   RESPIRATORY: No rales, rhonchi or wheeze. Normal breath sounds bilaterally.  GASTROINTESTINAL: Soft, nontender. Mid circular abdominal scar noted.   EXTREMITIES: No cyanosis or edema. No calf tenderness. Distal pulses wnl.   MUSCULOSKELETAL: 5/5 muscle strength in UE and LE.   SKIN: warm and dry with normal turgor.  NEURO: Alert/oriented x 3/normal motor exam. + dysarthria. 5/5 muscle strength. No loss of sensation  PSYCH: normal affect    Labs:                        15.6   8.0   )-----------( 265      ( 30 Nov 2017 17:09 )             44.0   11-30    138  |  97<L>  |  14.0  ----------------------------<  87  4.1   |  26.0  |  0.92    Ca    9.3      30 Nov 2017 17:09    TPro  8.2  /  Alb  4.3  /  TBili  0.5  /  DBili  x   /  AST  20  /  ALT  15  /  AlkPhos  47  11-30        Radiology: Images reviewed by me.  *Pull    Medications:  MEDICATIONS  (STANDING):  ALBUTerol    0.083% 2.5 milliGRAM(s) Nebulizer every 6 hours  aspirin enteric coated 81 milliGRAM(s) Oral daily  azithromycin  IVPB 500 milliGRAM(s) IV Intermittent every 24 hours  carvedilol 12.5 milliGRAM(s) Oral every 12 hours  cefTRIAXone   IVPB 1 Gram(s) IV Intermittent every 24 hours  saccharomyces boulardii 250 milliGRAM(s) Oral two times a day  triamterene 37.5 mG/hydrochlorothiazide 25 mG Tablet 1 Tablet(s) Oral daily    MEDICATIONS  (PRN):  acetaminophen   Tablet 650 milliGRAM(s) Oral every 6 hours PRN For Temp greater than 38 C (100.4 F)  acetaminophen   Tablet. 650 milliGRAM(s) Oral every 6 hours PRN Moderate Pain (4 - 6)

## 2017-12-01 NOTE — PATIENT PROFILE ADULT. - ABILITY TO HEAR (WITH HEARING AID OR HEARING APPLIANCE IF NORMALLY USED):
left hearing aid/Mildly to Moderately Impaired: difficulty hearing in some environments or speaker may need to increase volume or speak distinctly

## 2017-12-01 NOTE — PROGRESS NOTE ADULT - PROBLEM SELECTOR PLAN 5
Uncontrolled.  Will restart Corg 6.25mg BID with parameter. Uncontrolled.  Will restart coreg 12.5mg BID with parameter.

## 2017-12-02 LAB
ANION GAP SERPL CALC-SCNC: 14 MMOL/L — SIGNIFICANT CHANGE UP (ref 5–17)
APPEARANCE UR: CLEAR — SIGNIFICANT CHANGE UP
APPEARANCE UR: CLEAR — SIGNIFICANT CHANGE UP
BACTERIA # UR AUTO: ABNORMAL
BACTERIA # UR AUTO: ABNORMAL
BILIRUB UR-MCNC: NEGATIVE — SIGNIFICANT CHANGE UP
BILIRUB UR-MCNC: NEGATIVE — SIGNIFICANT CHANGE UP
BUN SERPL-MCNC: 18 MG/DL — SIGNIFICANT CHANGE UP (ref 8–20)
CALCIUM SERPL-MCNC: 8.9 MG/DL — SIGNIFICANT CHANGE UP (ref 8.6–10.2)
CHLORIDE SERPL-SCNC: 98 MMOL/L — SIGNIFICANT CHANGE UP (ref 98–107)
CO2 SERPL-SCNC: 26 MMOL/L — SIGNIFICANT CHANGE UP (ref 22–29)
COLOR SPEC: YELLOW — SIGNIFICANT CHANGE UP
COLOR SPEC: YELLOW — SIGNIFICANT CHANGE UP
CREAT SERPL-MCNC: 0.84 MG/DL — SIGNIFICANT CHANGE UP (ref 0.5–1.3)
DIFF PNL FLD: ABNORMAL
DIFF PNL FLD: NEGATIVE — SIGNIFICANT CHANGE UP
EPI CELLS # UR: ABNORMAL
EPI CELLS # UR: SIGNIFICANT CHANGE UP
GLUCOSE SERPL-MCNC: 138 MG/DL — HIGH (ref 70–115)
GLUCOSE UR QL: NEGATIVE MG/DL — SIGNIFICANT CHANGE UP
GLUCOSE UR QL: NEGATIVE MG/DL — SIGNIFICANT CHANGE UP
HCT VFR BLD CALC: 44.6 % — SIGNIFICANT CHANGE UP (ref 42–52)
HGB BLD-MCNC: 15.3 G/DL — SIGNIFICANT CHANGE UP (ref 14–18)
INR BLD: 3.47 RATIO — HIGH (ref 0.88–1.16)
KETONES UR-MCNC: NEGATIVE — SIGNIFICANT CHANGE UP
KETONES UR-MCNC: NEGATIVE — SIGNIFICANT CHANGE UP
LEGIONELLA AG UR QL: NEGATIVE — SIGNIFICANT CHANGE UP
LEUKOCYTE ESTERASE UR-ACNC: ABNORMAL
LEUKOCYTE ESTERASE UR-ACNC: ABNORMAL
MCHC RBC-ENTMCNC: 28 PG — SIGNIFICANT CHANGE UP (ref 27–31)
MCHC RBC-ENTMCNC: 34.3 G/DL — SIGNIFICANT CHANGE UP (ref 32–36)
MCV RBC AUTO: 81.5 FL — SIGNIFICANT CHANGE UP (ref 80–94)
NITRITE UR-MCNC: NEGATIVE — SIGNIFICANT CHANGE UP
NITRITE UR-MCNC: NEGATIVE — SIGNIFICANT CHANGE UP
NT-PROBNP SERPL-SCNC: 14 PG/ML — SIGNIFICANT CHANGE UP (ref 0–300)
PH UR: 5 — SIGNIFICANT CHANGE UP (ref 5–8)
PH UR: 6.5 — SIGNIFICANT CHANGE UP (ref 5–8)
PLATELET # BLD AUTO: 207 K/UL — SIGNIFICANT CHANGE UP (ref 150–400)
POTASSIUM SERPL-MCNC: 3.5 MMOL/L — SIGNIFICANT CHANGE UP (ref 3.5–5.3)
POTASSIUM SERPL-SCNC: 3.5 MMOL/L — SIGNIFICANT CHANGE UP (ref 3.5–5.3)
PROT UR-MCNC: 15 MG/DL
PROT UR-MCNC: 15 MG/DL
PROTHROM AB SERPL-ACNC: 39.2 SEC — HIGH (ref 9.8–12.7)
RBC # BLD: 5.47 M/UL — SIGNIFICANT CHANGE UP (ref 4.6–6.2)
RBC # FLD: 13.5 % — SIGNIFICANT CHANGE UP (ref 11–15.6)
RBC CASTS # UR COMP ASSIST: SIGNIFICANT CHANGE UP /HPF (ref 0–4)
RBC CASTS # UR COMP ASSIST: SIGNIFICANT CHANGE UP /HPF (ref 0–4)
SODIUM SERPL-SCNC: 138 MMOL/L — SIGNIFICANT CHANGE UP (ref 135–145)
SP GR SPEC: 1.01 — SIGNIFICANT CHANGE UP (ref 1.01–1.02)
SP GR SPEC: 1.02 — SIGNIFICANT CHANGE UP (ref 1.01–1.02)
UROBILINOGEN FLD QL: NEGATIVE MG/DL — SIGNIFICANT CHANGE UP
UROBILINOGEN FLD QL: NEGATIVE MG/DL — SIGNIFICANT CHANGE UP
WBC # BLD: 7.3 K/UL — SIGNIFICANT CHANGE UP (ref 4.8–10.8)
WBC # FLD AUTO: 7.3 K/UL — SIGNIFICANT CHANGE UP (ref 4.8–10.8)
WBC UR QL: >50
WBC UR QL: ABNORMAL

## 2017-12-02 PROCEDURE — 99233 SBSQ HOSP IP/OBS HIGH 50: CPT | Mod: GC

## 2017-12-02 RX ORDER — LISINOPRIL 2.5 MG/1
5 TABLET ORAL DAILY
Qty: 0 | Refills: 0 | Status: DISCONTINUED | OUTPATIENT
Start: 2017-12-02 | End: 2017-12-03

## 2017-12-02 RX ORDER — NITROFURANTOIN MACROCRYSTAL 50 MG
100 CAPSULE ORAL
Qty: 0 | Refills: 0 | Status: DISCONTINUED | OUTPATIENT
Start: 2017-12-02 | End: 2017-12-03

## 2017-12-02 RX ADMIN — CARVEDILOL PHOSPHATE 12.5 MILLIGRAM(S): 80 CAPSULE, EXTENDED RELEASE ORAL at 17:35

## 2017-12-02 RX ADMIN — Medication 100 MILLIGRAM(S): at 21:58

## 2017-12-02 RX ADMIN — Medication 1 TABLET(S): at 05:03

## 2017-12-02 RX ADMIN — ALBUTEROL 2.5 MILLIGRAM(S): 90 AEROSOL, METERED ORAL at 21:50

## 2017-12-02 RX ADMIN — AZITHROMYCIN 255 MILLIGRAM(S): 500 TABLET, FILM COATED ORAL at 05:04

## 2017-12-02 RX ADMIN — Medication 250 MILLIGRAM(S): at 05:03

## 2017-12-02 RX ADMIN — Medication 81 MILLIGRAM(S): at 11:19

## 2017-12-02 RX ADMIN — CEFTRIAXONE 100 GRAM(S): 500 INJECTION, POWDER, FOR SOLUTION INTRAMUSCULAR; INTRAVENOUS at 04:00

## 2017-12-02 RX ADMIN — CARVEDILOL PHOSPHATE 12.5 MILLIGRAM(S): 80 CAPSULE, EXTENDED RELEASE ORAL at 05:03

## 2017-12-02 RX ADMIN — ALBUTEROL 2.5 MILLIGRAM(S): 90 AEROSOL, METERED ORAL at 02:33

## 2017-12-02 NOTE — PROGRESS NOTE ADULT - PROBLEM SELECTOR PLAN 4
-AC with coumadin. Hold dose tonight given supertherapeutic level if stays  -pt rate controlled. Continue Coreg 12.5 BID  f/u AM PT/INR -AC with coumadin. Hold dose tonight given supratherapeutic level if stays  -pt rate controlled. Continue Coreg 12.5 BID  f/u AM PT/INR

## 2017-12-02 NOTE — PROGRESS NOTE ADULT - PROBLEM SELECTOR PLAN 3
Resolved.   likely pleuritic in nature given possible pneumonia however given extensive cardiac history cannot exclude cardiac etiology at this time.   -EKG reviewed, no previous EKG available, T wave inversions evident in inferolateral leads  -neg trop  -f/u cardio outpt. Resolved.   likely pleuritic in nature given possible pneumonia however given extensive cardiac history cannot exclude cardiac etiology at this time.   -Pt does not clinically appear to be in heart failure but will obtain BNP and Echo.  -EKG reviewed, no previous EKG available, T wave inversions evident in inferolateral leads  -neg trop  -Will need to obtain record from his cardiologist

## 2017-12-02 NOTE — PROGRESS NOTE ADULT - ATTENDING COMMENTS
Pt seen and examined with FM residents.  A&P reviewed.  Clinically improved - recent "cold" for 5 days.  ? Mucous plugging transient, resolved by now - observe on abx, doubt PE, f/u procalcitonin - if neg will dc abx, if positive and pt remains stable will change to po and DC home in am.  Monitor for hypoxia
No evidence of pneumonia or CHF clinically. Patient NOT on Azithromycin. Doubt VTE as supratherapeutic INR.  Work-up significant for atelectasis. Add Incentive spirometry.  TTE with rEF. (?baseline). BNP normal. Continue to hold Coumadin.  Add ACEI. Continue PO diuretics.  If remains stable anticipate discharge home in next 24 hours

## 2017-12-02 NOTE — PROGRESS NOTE ADULT - ASSESSMENT
Pt is 28y/o M with hx of CVA in 2013, Afib, CHF, HTN who presents to the E.D with SOB and admitted for Respiratory distress secondary to pneumonia. CT chest with possible bibasilar infection or pneumonitis. Not impressive. Procalcitonin done and neg. Will d/c antibiotics and discharge home today to f/u outpt. Pt is 28y/o M with hx of CVA in 2013, Afib, CHF, HTN who presents to the E.D with SOB and admitted for Respiratory distress secondary to pneumonia. CT chest with possible bibasilar infection or pneumonitis. Not impressive. Procalcitonin done and neg. Antibiotics discontinued. Respiratory distress has some possible cardiac origin. Will obtain BNP and Echo and follow up.

## 2017-12-02 NOTE — PROGRESS NOTE ADULT - PROBLEM SELECTOR PLAN 6
Pt supertherapeutic. Hold coumadin. F/u PT/INR in AM.   Will order VCD boot while at rest. Pt supratherapeutic. Hold coumadin. F/u PT/INR in AM.   Will order VCD boot while at rest.

## 2017-12-02 NOTE — PROGRESS NOTE ADULT - SUBJECTIVE AND OBJECTIVE BOX
Patient is a 27y Male who presents with a chief complaint of SOB, cough (30 Nov 2017 20:02)    Patient seen and examined at bedside, No acute overnight events. Today pt says SOB is improved and he feels much better. He denies fever, chest pain, palpitations, abdominal pain, diarrhea, constipation, calf pain. Patient ambulating, eating well, voiding.    ROS: Neg except as above.    Vital Signs Last 24 Hrs  T(C): 36.4 (02 Dec 2017 00:38), Max: 36.7 (01 Dec 2017 11:36)  T(F): 97.5 (02 Dec 2017 00:38), Max: 98.1 (01 Dec 2017 18:28)  HR: 69 (02 Dec 2017 05:02) (64 - 74)  BP: 137/95 (02 Dec 2017 05:02) (129/98 - 141/98)  RR: 18 (02 Dec 2017 00:38) (18 - 18)  SpO2: 92% (02 Dec 2017 00:38) (92% - 98%)    PHYSICAL EXAM: Vital signs reviewed.  GENERAL: Appears well developed.   HEENT: PERRLA, EOMI. Facial dyssymmetry noted. Left iris with white patchiness. + Dysarthria  NECK: Supple, no JVD. Tracheostomy site now closed with scar noted.  CARDIOVASCULAR: S1 and S2, irregular, No murmur. No edema. AICD in place over left chest.   RESPIRATORY: No rales, rhonchi or wheeze. Normal breath sounds bilaterally.  GASTROINTESTINAL: Soft, nontender. Mid circular abdominal scar noted.   EXTREMITIES: No cyanosis or edema. No calf tenderness. Distal pulses wnl.   MUSCULOSKELETAL: 5/5 muscle strength in UE and LE.   SKIN: warm and dry with normal turgor.  NEURO: Alert/oriented x 3/normal motor exam. + dysarthria. 5/5 muscle strength. No loss of sensation  PSYCH: normal affect    Labs:                                         14.8   7.8   )-----------( 177      ( 01 Dec 2017 08:40 )             43.1   12-01    136  |  97<L>  |  17.0  ----------------------------<  105  4.0   |  25.0  |  0.89    Ca    8.8      01 Dec 2017 08:34  Mg     2.1     12-01    TPro  7.7  /  Alb  4.1  /  TBili  0.5  /  DBili  x   /  AST  21  /  ALT  15  /  AlkPhos  44  12-01      Radiology: Images reviewed by me.  CHEST:     LUNGS AND LARGE AIRWAYS: Trace bibasilar endobronchial mucous.  Exam was   limited by motion, however there may be scattered tree-in-bud and   groundglass opacities at the bases, possibly infectious. Subsegmental   atelectasis at the right base.  PLEURA: No pleural effusion.  VESSELS: Within normal limits.  HEART: Cardiomegaly. Left-sided pacemaker. No pericardial effusion.  MEDIASTINUM AND MICHELLE: No lymphadenopathy.  CHEST WALL AND LOWER NECK: Within normal limits.  VISUALIZED UPPER ABDOMEN: Within normal limits.  BONES: Within normal limits.    IMPRESSION: Possible bibasilar infectious or inflammatory pneumonitis and   subsegmental atelectasis.      Medications:  MEDICATIONS  (STANDING):  ALBUTerol    0.083% 2.5 milliGRAM(s) Nebulizer every 6 hours  aspirin enteric coated 81 milliGRAM(s) Oral daily  azithromycin  IVPB 500 milliGRAM(s) IV Intermittent every 24 hours  carvedilol 12.5 milliGRAM(s) Oral every 12 hours  cefTRIAXone   IVPB 1 Gram(s) IV Intermittent every 24 hours  saccharomyces boulardii 250 milliGRAM(s) Oral two times a day  triamterene 37.5 mG/hydrochlorothiazide 25 mG Tablet 1 Tablet(s) Oral daily    MEDICATIONS  (PRN):  acetaminophen   Tablet 650 milliGRAM(s) Oral every 6 hours PRN For Temp greater than 38 C (100.4 F)  acetaminophen   Tablet. 650 milliGRAM(s) Oral every 6 hours PRN Moderate Pain (4 - 6) Patient is a 27y Male who presents with a chief complaint of SOB, cough (30 Nov 2017 20:02)    Patient seen and examined at bedside, No acute overnight events. Today pt says SOB is improved and he feels much better. He denies orthopnea, PND, LE edema, fever, chest pain, palpitations, calf pain. Patient ambulating, eating well, voiding.    ROS: Neg except as above.    Vital Signs Last 24 Hrs  T(C): 36.6 (02 Dec 2017 08:47), Max: 36.7 (01 Dec 2017 11:36)  T(F): 97.9 (02 Dec 2017 08:47), Max: 98.1 (01 Dec 2017 18:28)  HR: 69 (02 Dec 2017 08:47) (64 - 74)  BP: 119/84 (02 Dec 2017 08:47) (119/84 - 141/90)  RR: 18 (02 Dec 2017 08:47) (18 - 18)  SpO2: 95% (02 Dec 2017 08:47) (92% - 98%)    PHYSICAL EXAM: Vital signs reviewed.  GENERAL: Appears well developed.   HEENT: PERRLA, EOMI. Facial dyssymmetry noted. Left iris with white patchiness. + Dysarthria  NECK: Supple, no JVD. Tracheostomy site now closed with scar noted.  CARDIOVASCULAR: S1 and S2, irregular, No murmur. No edema. AICD in place over left chest.   RESPIRATORY: No rales, rhonchi or wheeze. Normal breath sounds bilaterally.  GASTROINTESTINAL: Soft, nontender. Mid circular abdominal scar noted.   EXTREMITIES: No cyanosis or edema. No calf tenderness. Distal pulses wnl.   MUSCULOSKELETAL: 5/5 muscle strength in UE and LE.   SKIN: warm and dry with normal turgor.  NEURO: Alert/oriented x 3/normal motor exam. + dysarthria. 5/5 muscle strength. No loss of sensation  PSYCH: normal affect    Labs:                                                          15.3   7.3   )-----------( 207      ( 02 Dec 2017 06:38 )             44.6   12-02    138  |  98  |  18.0  ----------------------------<  138<H>  3.5   |  26.0  |  0.84    Ca    8.9      02 Dec 2017 06:38  Mg     2.1     12-01    TPro  7.7  /  Alb  4.1  /  TBili  0.5  /  DBili  x   /  AST  21  /  ALT  15  /  AlkPhos  44  12-01        Radiology: Images reviewed by me.  CHEST:     LUNGS AND LARGE AIRWAYS: Trace bibasilar endobronchial mucous.  Exam was   limited by motion, however there may be scattered tree-in-bud and   groundglass opacities at the bases, possibly infectious. Subsegmental   atelectasis at the right base.  PLEURA: No pleural effusion.  VESSELS: Within normal limits.  HEART: Cardiomegaly. Left-sided pacemaker. No pericardial effusion.  MEDIASTINUM AND MICHELLE: No lymphadenopathy.  CHEST WALL AND LOWER NECK: Within normal limits.  VISUALIZED UPPER ABDOMEN: Within normal limits.  BONES: Within normal limits.    IMPRESSION: Possible bibasilar infectious or inflammatory pneumonitis and   subsegmental atelectasis.      Medications:  MEDICATIONS  (STANDING):  ALBUTerol    0.083% 2.5 milliGRAM(s) Nebulizer every 6 hours  aspirin enteric coated 81 milliGRAM(s) Oral daily  azithromycin  IVPB 500 milliGRAM(s) IV Intermittent every 24 hours  carvedilol 12.5 milliGRAM(s) Oral every 12 hours  cefTRIAXone   IVPB 1 Gram(s) IV Intermittent every 24 hours  saccharomyces boulardii 250 milliGRAM(s) Oral two times a day  triamterene 37.5 mG/hydrochlorothiazide 25 mG Tablet 1 Tablet(s) Oral daily    MEDICATIONS  (PRN):  acetaminophen   Tablet 650 milliGRAM(s) Oral every 6 hours PRN For Temp greater than 38 C (100.4 F)  acetaminophen   Tablet. 650 milliGRAM(s) Oral every 6 hours PRN Moderate Pain (4 - 6)

## 2017-12-03 ENCOUNTER — TRANSCRIPTION ENCOUNTER (OUTPATIENT)
Age: 27
End: 2017-12-03

## 2017-12-03 VITALS
DIASTOLIC BLOOD PRESSURE: 76 MMHG | HEART RATE: 69 BPM | TEMPERATURE: 98 F | RESPIRATION RATE: 18 BRPM | SYSTOLIC BLOOD PRESSURE: 113 MMHG | OXYGEN SATURATION: 99 %

## 2017-12-03 LAB
ANION GAP SERPL CALC-SCNC: 14 MMOL/L — SIGNIFICANT CHANGE UP (ref 5–17)
BUN SERPL-MCNC: 26 MG/DL — HIGH (ref 8–20)
CALCIUM SERPL-MCNC: 9.5 MG/DL — SIGNIFICANT CHANGE UP (ref 8.6–10.2)
CHLORIDE SERPL-SCNC: 97 MMOL/L — LOW (ref 98–107)
CO2 SERPL-SCNC: 27 MMOL/L — SIGNIFICANT CHANGE UP (ref 22–29)
CREAT SERPL-MCNC: 1.01 MG/DL — SIGNIFICANT CHANGE UP (ref 0.5–1.3)
CULTURE RESULTS: NO GROWTH — SIGNIFICANT CHANGE UP
GLUCOSE SERPL-MCNC: 92 MG/DL — SIGNIFICANT CHANGE UP (ref 70–115)
INR BLD: 2.09 RATIO — HIGH (ref 0.88–1.16)
POTASSIUM SERPL-MCNC: 3.8 MMOL/L — SIGNIFICANT CHANGE UP (ref 3.5–5.3)
POTASSIUM SERPL-SCNC: 3.8 MMOL/L — SIGNIFICANT CHANGE UP (ref 3.5–5.3)
PROTHROM AB SERPL-ACNC: 23.3 SEC — HIGH (ref 9.8–12.7)
SODIUM SERPL-SCNC: 138 MMOL/L — SIGNIFICANT CHANGE UP (ref 135–145)
SPECIMEN SOURCE: SIGNIFICANT CHANGE UP

## 2017-12-03 PROCEDURE — 94760 N-INVAS EAR/PLS OXIMETRY 1: CPT

## 2017-12-03 PROCEDURE — 93306 TTE W/DOPPLER COMPLETE: CPT

## 2017-12-03 PROCEDURE — 87086 URINE CULTURE/COLONY COUNT: CPT

## 2017-12-03 PROCEDURE — 36415 COLL VENOUS BLD VENIPUNCTURE: CPT

## 2017-12-03 PROCEDURE — 87449 NOS EACH ORGANISM AG IA: CPT

## 2017-12-03 PROCEDURE — 94640 AIRWAY INHALATION TREATMENT: CPT

## 2017-12-03 PROCEDURE — 93005 ELECTROCARDIOGRAM TRACING: CPT

## 2017-12-03 PROCEDURE — 84145 PROCALCITONIN (PCT): CPT

## 2017-12-03 PROCEDURE — 82553 CREATINE MB FRACTION: CPT

## 2017-12-03 PROCEDURE — 71045 X-RAY EXAM CHEST 1 VIEW: CPT

## 2017-12-03 PROCEDURE — 99233 SBSQ HOSP IP/OBS HIGH 50: CPT | Mod: GC

## 2017-12-03 PROCEDURE — 80048 BASIC METABOLIC PNL TOTAL CA: CPT

## 2017-12-03 PROCEDURE — 87040 BLOOD CULTURE FOR BACTERIA: CPT

## 2017-12-03 PROCEDURE — 82550 ASSAY OF CK (CPK): CPT

## 2017-12-03 PROCEDURE — 87581 M.PNEUMON DNA AMP PROBE: CPT

## 2017-12-03 PROCEDURE — 36600 WITHDRAWAL OF ARTERIAL BLOOD: CPT

## 2017-12-03 PROCEDURE — 96375 TX/PRO/DX INJ NEW DRUG ADDON: CPT

## 2017-12-03 PROCEDURE — 99285 EMERGENCY DEPT VISIT HI MDM: CPT | Mod: 25

## 2017-12-03 PROCEDURE — 85027 COMPLETE CBC AUTOMATED: CPT

## 2017-12-03 PROCEDURE — 87633 RESP VIRUS 12-25 TARGETS: CPT

## 2017-12-03 PROCEDURE — 82803 BLOOD GASES ANY COMBINATION: CPT

## 2017-12-03 PROCEDURE — 80053 COMPREHEN METABOLIC PANEL: CPT

## 2017-12-03 PROCEDURE — 81001 URINALYSIS AUTO W/SCOPE: CPT

## 2017-12-03 PROCEDURE — 96374 THER/PROPH/DIAG INJ IV PUSH: CPT

## 2017-12-03 PROCEDURE — 85730 THROMBOPLASTIN TIME PARTIAL: CPT

## 2017-12-03 PROCEDURE — 71250 CT THORAX DX C-: CPT

## 2017-12-03 PROCEDURE — 83605 ASSAY OF LACTIC ACID: CPT

## 2017-12-03 PROCEDURE — 85610 PROTHROMBIN TIME: CPT

## 2017-12-03 PROCEDURE — 83735 ASSAY OF MAGNESIUM: CPT

## 2017-12-03 PROCEDURE — 87798 DETECT AGENT NOS DNA AMP: CPT

## 2017-12-03 PROCEDURE — 84484 ASSAY OF TROPONIN QUANT: CPT

## 2017-12-03 PROCEDURE — 83880 ASSAY OF NATRIURETIC PEPTIDE: CPT

## 2017-12-03 PROCEDURE — 87486 CHLMYD PNEUM DNA AMP PROBE: CPT

## 2017-12-03 PROCEDURE — 85379 FIBRIN DEGRADATION QUANT: CPT

## 2017-12-03 RX ORDER — WARFARIN SODIUM 2.5 MG/1
1 TABLET ORAL
Qty: 30 | Refills: 0
Start: 2017-12-03 | End: 2018-01-02

## 2017-12-03 RX ORDER — TRIAMTERENE/HYDROCHLOROTHIAZID 75 MG-50MG
1 TABLET ORAL
Qty: 30 | Refills: 0
Start: 2017-12-03 | End: 2018-01-02

## 2017-12-03 RX ORDER — LISINOPRIL 2.5 MG/1
1 TABLET ORAL
Qty: 30 | Refills: 0
Start: 2017-12-03 | End: 2018-01-02

## 2017-12-03 RX ORDER — CARVEDILOL PHOSPHATE 80 MG/1
1 CAPSULE, EXTENDED RELEASE ORAL
Qty: 60 | Refills: 0
Start: 2017-12-03 | End: 2018-01-02

## 2017-12-03 RX ORDER — TRIAMTERENE/HYDROCHLOROTHIAZID 75 MG-50MG
1 TABLET ORAL
Qty: 0 | Refills: 0 | COMMUNITY

## 2017-12-03 RX ORDER — WARFARIN SODIUM 2.5 MG/1
7.5 TABLET ORAL
Qty: 0 | Refills: 0 | COMMUNITY

## 2017-12-03 RX ORDER — CARVEDILOL PHOSPHATE 80 MG/1
1 CAPSULE, EXTENDED RELEASE ORAL
Qty: 0 | Refills: 0 | COMMUNITY

## 2017-12-03 RX ADMIN — Medication 81 MILLIGRAM(S): at 09:09

## 2017-12-03 RX ADMIN — ALBUTEROL 2.5 MILLIGRAM(S): 90 AEROSOL, METERED ORAL at 16:03

## 2017-12-03 RX ADMIN — ALBUTEROL 2.5 MILLIGRAM(S): 90 AEROSOL, METERED ORAL at 04:05

## 2017-12-03 RX ADMIN — LISINOPRIL 5 MILLIGRAM(S): 2.5 TABLET ORAL at 05:13

## 2017-12-03 RX ADMIN — Medication 1 TABLET(S): at 05:15

## 2017-12-03 RX ADMIN — ALBUTEROL 2.5 MILLIGRAM(S): 90 AEROSOL, METERED ORAL at 08:53

## 2017-12-03 RX ADMIN — Medication 100 MILLIGRAM(S): at 09:08

## 2017-12-03 RX ADMIN — CARVEDILOL PHOSPHATE 12.5 MILLIGRAM(S): 80 CAPSULE, EXTENDED RELEASE ORAL at 05:15

## 2017-12-03 NOTE — DISCHARGE NOTE ADULT - INSTRUCTIONS
Continue a DASH (Dietary Approaches to Stop Hypertension) diet. Include more fruits, vegetables, whole grains, and low-fat dairy. Reduce intake of added sugars, solid fats, refined grains, and sodium. Limit foods that are high in saturated fat, such as fatty meats, full-fat dairy products, and tropical oils such as coconut, palm kernel, and palm oils. Limit sugar-sweetened beverages and sweets. When following the DASH eating plan, it is important to choose foods that are:  Low in saturated and trans fats  Rich in potassium, calcium, magnesium, fiber, and protein  Lower in sodium

## 2017-12-03 NOTE — DISCHARGE NOTE ADULT - PATIENT PORTAL LINK FT
“You can access the Follow[facility] Patient Portal, offered by [facility], by registering with the following website: [email or web address]”

## 2017-12-03 NOTE — PROGRESS NOTE ADULT - PROBLEM SELECTOR PLAN 3
Resolved.   likely pleuritic in nature given possible pneumonia however given extensive cardiac history cannot exclude cardiac etiology at this time.   -Pt does not clinically appear to be in heart failure but will obtain BNP and Echo.  -EKG reviewed, no previous EKG available, T wave inversions evident in inferolateral leads  -neg trop  -Will need to obtain record from his cardiologist

## 2017-12-03 NOTE — PROGRESS NOTE ADULT - SUBJECTIVE AND OBJECTIVE BOX
Patient is a 27y Male who presents with a chief complaint of SOB, cough (30 Nov 2017 20:02)    Patient seen and examined at bedside, No acute overnight events. Patient states that he has no SOB today. Feels much better. No other complaints at this time.     ROS: He denies orthopnea, PND, LE edema, fever, chest pain, palpitations, calf pain. Patient ambulating, eating well, voiding, BM yesterday.       MONITOR: bradycardia at 5am 40 - 45 bmp, lasting 3 seconds    Vital Signs Last 24 Hrs  T(C): 36.8 (03 Dec 2017 07:31), Max: 37.1 (02 Dec 2017 23:49)  T(F): 98.2 (03 Dec 2017 07:31), Max: 98.8 (02 Dec 2017 23:49)  HR: 68 (03 Dec 2017 07:31) (68 - 87)  BP: 112/78 (03 Dec 2017 07:31) (112/78 - 145/95)  RR: 18 (03 Dec 2017 07:31) (18 - 18)  SpO2: 95% (03 Dec 2017 07:31) (94% - 98%)      PHYSICAL EXAM: Vital signs reviewed.  GENERAL: Appears well developed.   HEENT: PERRLA, EOMI. Facial dyssymmetry noted. Left iris with white patchiness. + Dysarthria  NECK: Supple, no JVD. Tracheostomy site now closed with scar noted.  CARDIOVASCULAR: S1 and S2, irregular, No murmur. No edema. AICD in place over left chest.   RESPIRATORY: No rales, rhonchi or wheeze. Normal breath sounds bilaterally.  GASTROINTESTINAL: Soft, nontender. Mid circular abdominal scar noted.   EXTREMITIES: No cyanosis or edema. No calf tenderness. Distal pulses wnl.   MUSCULOSKELETAL: 5/5 muscle strength in UE and LE.   SKIN: warm and dry with normal turgor.  NEURO: Alert/oriented x 3/normal motor exam. + dysarthria. 5/5 muscle strength. No loss of sensation  PSYCH: normal affect    Labs:                                   15.3   7.3   )-----------( 207      ( 02 Dec 2017 06:38 )             44.6       12-03    138  |  97<L>  |  26.0<H>  ----------------------------<  92  3.8   |  27.0  |  1.01    Ca    9.5      03 Dec 2017 06:44  Mg     2.1     12-01    TPro  7.7  /  Alb  4.1  /  TBili  0.5  /  DBili  x   /  AST  21  /  ALT  15  /  AlkPhos  44  12-01      Radiology: Images reviewed by me.  CHEST:     LUNGS AND LARGE AIRWAYS: Trace bibasilar endobronchial mucous.  Exam was   limited by motion, however there may be scattered tree-in-bud and   groundglass opacities at the bases, possibly infectious. Subsegmental   atelectasis at the right base.  PLEURA: No pleural effusion.  VESSELS: Within normal limits.  HEART: Cardiomegaly. Left-sided pacemaker. No pericardial effusion.  MEDIASTINUM AND MICHELLE: No lymphadenopathy.  CHEST WALL AND LOWER NECK: Within normal limits.  VISUALIZED UPPER ABDOMEN: Within normal limits.  BONES: Within normal limits.    IMPRESSION: Possible bibasilar infectious or inflammatory pneumonitis and   subsegmental atelectasis.      < from: TTE Echo Complete w/Doppler (12.02.17 @ 12:31) >   Summary:   1. Left ventricular ejection fraction, by visual estimation, is 35 to   40%.   2. Moderately decreased global left ventricular systolic function.   3. There is mild concentric left ventricular hypertrophy.   4. S/p ASD repair device,which appears well-seated. There is no   apparent interatrial shunt.   5. Mildly enlarged left atrium.   6. Spectral Doppler shows impaired relaxation pattern of left   ventricular myocardial filling (Grade I diastolic dysfunction).    MEDICATIONS  (STANDING):  ALBUTerol    0.083% 2.5 milliGRAM(s) Nebulizer every 6 hours  aspirin enteric coated 81 milliGRAM(s) Oral daily  carvedilol 12.5 milliGRAM(s) Oral every 12 hours  influenza   Vaccine 0.5 milliLiter(s) IntraMuscular once  lisinopril 5 milliGRAM(s) Oral daily  nitrofurantoin (MACROBID) 100 milliGRAM(s) Oral two times a day with meals  triamterene 37.5 mG/hydrochlorothiazide 25 mG Tablet 1 Tablet(s) Oral daily    MEDICATIONS  (PRN):  acetaminophen   Tablet 650 milliGRAM(s) Oral every 6 hours PRN For Temp greater than 38 C (100.4 F)  acetaminophen   Tablet. 650 milliGRAM(s) Oral every 6 hours PRN Moderate Pain (4 - 6)

## 2017-12-03 NOTE — DISCHARGE NOTE ADULT - NS AS ACTIVITY OBS
as tolerated Return to Work/School allowed/Walking-Outdoors allowed/Stairs allowed/as tolerated/Bathing allowed/Showering allowed/Walking-Indoors allowed

## 2017-12-03 NOTE — PROGRESS NOTE ADULT - PROBLEM SELECTOR PLAN 1
-as evidenced by ABG- on repeat hypoxia and acidosis resolved  -O2 Via NC prn to keep POx>93%  -ICD boots while in bed for DVT prophylaxis-pt already therapeutically anticoagulated on coumadin  -F/U Am labs ordered.  neg legionella in urine. Neg procalcitonin
-as evidenced by ABG- on repeat hypoxia and acidosis resolved  -O2 Via NC prn to keep POx>93%  -ICD boots while in bed for DVT prophylaxis-pt already therapeutically anticoagulated on coumadin  -F/U Am labs ordered.  neg legionella in urine. Neg procalcitonin
-as evidenced by ABG- on repeat hypoxia and acidosis resolved  -O2 Via NC prn to keep POx>93%  -ambulate as tolerated  -DASH/TLC diet  -ICD boots while in bed for DVT prophylaxis-pt already therapeutically anticoagulated on coumadin  - CBC, CMP, PT/INR, Troponin neg times 2, done in E.D  -F/U Am labs ordered.

## 2017-12-03 NOTE — PROGRESS NOTE ADULT - ASSESSMENT
Pt is 26y/o M with hx of CVA in 2013, Afib, CHF, HTN who presents to the E.D with SOB and admitted for Respiratory distress secondary to pneumonia. CT chest with possible bibasilar infection or pneumonitis. Not impressive. Procalcitonin done and neg. Antibiotics discontinued. Respiratory distress has some possible cardiac origin. TTE shows EF of 35 - 40% with moderately reduced LV systolic function.

## 2017-12-03 NOTE — DISCHARGE NOTE ADULT - MEDICATION SUMMARY - MEDICATIONS TO TAKE
I will START or STAY ON the medications listed below when I get home from the hospital:    Aspirin Low Dose 81 mg oral delayed release tablet  -- 1 tab(s) by mouth once a day  -- Indication: For Clot prevention    lisinopril 5 mg oral tablet  -- 1 tab(s) by mouth once a day  -- Indication: For Hypertension/heart failure    Coumadin 6 mg oral tablet  -- 1 tab(s) by mouth once a day (at bedtime)   -- Do not take this drug if you are pregnant.  It is very important that you take or use this exactly as directed.  Do not skip doses or discontinue unless directed by your doctor.  Obtain medical advice before taking any non-prescription drugs as some may affect the action of this medication.    -- Indication: For Clot prevention    triamterene-hydrochlorothiazide 37.5 mg-25 mg oral tablet  -- 1 tab(s) by mouth once a day  -- Indication: For Hypertension    Coreg 12.5 mg oral tablet  -- 1 tab(s) by mouth 2 times a day  -- Indication: For Heart failure

## 2017-12-03 NOTE — DISCHARGE NOTE ADULT - ADDITIONAL INSTRUCTIONS
Please follow up with your primary doctor within 3 - 5 days.   Please follow up with your cardiologist within 1 week.

## 2017-12-03 NOTE — DISCHARGE NOTE ADULT - MEDICATION SUMMARY - MEDICATIONS TO CHANGE
I will SWITCH the dose or number of times a day I take the medications listed below when I get home from the hospital:    Coreg 6.25 mg oral tablet  -- 1 tab(s) by mouth 2 times a day    Coumadin  -- 7.5 milligram(s) by mouth once a day (at bedtime)

## 2017-12-03 NOTE — PROGRESS NOTE ADULT - PROBLEM SELECTOR PROBLEM 2
Pneumonia of right lower lobe due to infectious organism

## 2017-12-03 NOTE — DISCHARGE NOTE ADULT - HOSPITAL COURSE
26y/o M with hx of CVA in 2013, Afib, CHF, HTN, AICD last interrogated a year ago who presented with SOB and cough. CT showed possible bibasilar infectious or inflammatory pneumonitis and subsegmental atelectasis. Procalcitonin level were not elevated. SOB resolved during hospital stay. A TTE showed left ventricular ejection fraction, by visual estimation, was 35 to 40% with moderately decreased global left ventricular systolic function and mild concentric left ventricular hypertrophy. An echo in 2012 showed EF of 10 – 15 percent as per Dr. Pino’s cardiology group. Patient will follow up with cardiology as outpatient. Patient medically optimized for discharge and advised to return should any concerns or issues arise. 28y/o M with hx of CVA in 2013, Afib, CHF, HTN, AICD last interrogated a year ago who presented with SOB and cough. CT showed possible bibasilar infectious or inflammatory pneumonitis and subsegmental atelectasis. Procalcitonin level were not elevated. SOB resolved during hospital stay. A TTE showed left ventricular ejection fraction, by visual estimation, was 35 to 40% with moderately decreased global left ventricular systolic function and mild concentric left ventricular hypertrophy. An echo in 2012 showed EF of 10 – 15 percent as per Dr. Pino’s cardiology group. Patient will follow up with cardiology as outpatient. On discharge patient says he is feeling a lot better, patient is tolerating PO, he is ambulating, patient denies shortness if breath.  Patient medically optimized for discharge and advised to follow up with his primary care provider as well as his cardiologist within 3 to 5 days. 26y/o M with hx of CVA in 2013, Afib, CHF, HTN, AICD last interrogated a year ago who presented with SOB and cough. CT showed possible bibasilar infectious or inflammatory pneumonitis and subsegmental atelectasis. Procalcitonin level were not elevated. SOB resolved during hospital stay. A TTE showed left ventricular ejection fraction, by visual estimation, was 35 to 40% with moderately decreased global left ventricular systolic function and mild concentric left ventricular hypertrophy. An echo in 2012 showed EF of 10 – 15 percent as per Dr. Pino’s cardiology group. Patient will follow up with cardiology as outpatient. On discharge patient says he is feeling a lot better, patient is tolerating PO, he is ambulating, patient denies shortness if breath.  Patient medically optimized for discharge and advised to follow up with his primary care provider as well as his cardiologist within 3 to 5 days.    Discharge time - 35 minutes

## 2017-12-03 NOTE — DISCHARGE NOTE ADULT - PLAN OF CARE
Prevention of acute exacerbation Please continue taking your medications as directed by your cardiologist. Please follow up with your cardiologist within 1 week at the contact information listed below. Please continue to take your medications including Cardizem and Coumadin for your atrial fibrillation to prevent any strokes in the future. It is very important that you continue your medications. Please continue to take your medications including Cardizem and Coumadin for your atrial fibrillation to prevent any strokes in the future. It is very important that you continue your medications and follow up with your primary doctor to have your INR level checked.

## 2017-12-03 NOTE — DISCHARGE NOTE ADULT - CARE PROVIDER_API CALL
Jesus Pino), Cardiovascular Disease  [address] [place] [address]  Phone: [phone]  Fax: [phone]

## 2017-12-03 NOTE — PROGRESS NOTE ADULT - PROBLEM SELECTOR PLAN 2
-D/C rocephin 1G Q24 hrs, zithromax 500mg Q24 hours given neg procalcitonin.  -albuterol via nebulization q 6 hrs  -RVP panel negative  -neg legionella urine antigen    -incentive spirometry while awake
-Continue rocephin 1G Q24 hrs, zithromax 500mg Q24 hours  -albuterol via nebulization q 6 hrs  -RVP panel negative  -f/u legionella urine antigen   -chest x ray reviewed, RLL inflitrate seen   -incentive spirometry while awake
-D/C rocephin 1G Q24 hrs, zithromax 500mg Q24 hours given neg procalcitonin.  -albuterol via nebulization q 6 hrs  -RVP panel negative  -neg legionella urine antigen    -incentive spirometry while awake

## 2017-12-03 NOTE — DISCHARGE NOTE ADULT - CARE PLAN
Principal Discharge DX:	Chronic systolic congestive heart failure  Goal:	Prevention of acute exacerbation  Instructions for follow-up, activity and diet:	Please continue taking your medications as directed by your cardiologist. Please follow up with your cardiologist within 1 week at the contact information listed below.  Secondary Diagnosis:	Atrial fibrillation, unspecified type  Instructions for follow-up, activity and diet:	Please continue to take your medications including Cardizem and Coumadin for your atrial fibrillation to prevent any strokes in the future. It is very important that you continue your medications. Principal Discharge DX:	Chronic systolic congestive heart failure  Goal:	Prevention of acute exacerbation  Instructions for follow-up, activity and diet:	Please continue taking your medications as directed by your cardiologist. Please follow up with your cardiologist within 1 week at the contact information listed below.  Secondary Diagnosis:	Atrial fibrillation, unspecified type  Instructions for follow-up, activity and diet:	Please continue to take your medications including Cardizem and Coumadin for your atrial fibrillation to prevent any strokes in the future. It is very important that you continue your medications and follow up with your primary doctor to have your INR level checked.

## 2017-12-03 NOTE — PROGRESS NOTE ADULT - PROBLEM SELECTOR PLAN 4
-AC with coumadin. Consider restarting Coumadin as INR is within therapeutic levels at 2.09 this morning.  -pt rate controlled. Continue Coreg 12.5 BID  f/u AM PT/INR

## 2017-12-05 DIAGNOSIS — R69 ILLNESS, UNSPECIFIED: ICD-10-CM

## 2017-12-05 PROBLEM — I50.9 HEART FAILURE, UNSPECIFIED: Chronic | Status: ACTIVE | Noted: 2017-11-30

## 2017-12-05 PROBLEM — I63.9 CEREBRAL INFARCTION, UNSPECIFIED: Chronic | Status: ACTIVE | Noted: 2017-11-30

## 2017-12-05 PROBLEM — I10 ESSENTIAL (PRIMARY) HYPERTENSION: Chronic | Status: ACTIVE | Noted: 2017-11-30

## 2017-12-05 PROBLEM — I48.91 UNSPECIFIED ATRIAL FIBRILLATION: Chronic | Status: ACTIVE | Noted: 2017-11-30

## 2017-12-05 PROBLEM — G45.9 TRANSIENT CEREBRAL ISCHEMIC ATTACK, UNSPECIFIED: Chronic | Status: ACTIVE | Noted: 2017-11-30

## 2017-12-06 LAB
CULTURE RESULTS: SIGNIFICANT CHANGE UP
CULTURE RESULTS: SIGNIFICANT CHANGE UP
SPECIMEN SOURCE: SIGNIFICANT CHANGE UP
SPECIMEN SOURCE: SIGNIFICANT CHANGE UP

## 2017-12-19 ENCOUNTER — APPOINTMENT (OUTPATIENT)
Dept: CARDIOLOGY | Facility: CLINIC | Age: 27
End: 2017-12-19
Payer: MEDICAID

## 2017-12-19 VITALS
HEART RATE: 74 BPM | HEIGHT: 62 IN | OXYGEN SATURATION: 96 % | DIASTOLIC BLOOD PRESSURE: 96 MMHG | SYSTOLIC BLOOD PRESSURE: 144 MMHG

## 2017-12-19 VITALS — SYSTOLIC BLOOD PRESSURE: 142 MMHG | DIASTOLIC BLOOD PRESSURE: 96 MMHG

## 2017-12-19 PROCEDURE — 99214 OFFICE O/P EST MOD 30 MIN: CPT

## 2017-12-19 PROCEDURE — 93000 ELECTROCARDIOGRAM COMPLETE: CPT

## 2017-12-19 RX ORDER — LISINOPRIL 5 MG/1
5 TABLET ORAL DAILY
Qty: 90 | Refills: 3 | Status: DISCONTINUED | COMMUNITY
Start: 2017-12-19 | End: 2017-12-19

## 2017-12-19 RX ORDER — LISINOPRIL 5 MG/1
5 TABLET ORAL
Qty: 30 | Refills: 0 | Status: DISCONTINUED | COMMUNITY
Start: 2017-12-03

## 2017-12-19 RX ORDER — AMLODIPINE BESYLATE 10 MG/1
10 TABLET ORAL DAILY
Qty: 30 | Refills: 3 | Status: DISCONTINUED | COMMUNITY
Start: 2017-07-27 | End: 2017-12-19

## 2017-12-20 ENCOUNTER — NON-APPOINTMENT (OUTPATIENT)
Age: 27
End: 2017-12-20

## 2018-01-11 ENCOUNTER — APPOINTMENT (OUTPATIENT)
Dept: CARDIOLOGY | Facility: CLINIC | Age: 28
End: 2018-01-11

## 2018-01-22 ENCOUNTER — NON-APPOINTMENT (OUTPATIENT)
Age: 28
End: 2018-01-22

## 2018-01-22 ENCOUNTER — APPOINTMENT (OUTPATIENT)
Dept: CARDIOLOGY | Facility: CLINIC | Age: 28
End: 2018-01-22
Payer: MEDICAID

## 2018-01-22 VITALS
SYSTOLIC BLOOD PRESSURE: 122 MMHG | HEART RATE: 51 BPM | HEIGHT: 62 IN | OXYGEN SATURATION: 97 % | DIASTOLIC BLOOD PRESSURE: 78 MMHG | WEIGHT: 158 LBS | BODY MASS INDEX: 29.08 KG/M2

## 2018-01-22 DIAGNOSIS — Z00.00 ENCOUNTER FOR GENERAL ADULT MEDICAL EXAMINATION W/OUT ABNORMAL FINDINGS: ICD-10-CM

## 2018-01-22 PROCEDURE — 93000 ELECTROCARDIOGRAM COMPLETE: CPT

## 2018-01-22 PROCEDURE — 99214 OFFICE O/P EST MOD 30 MIN: CPT | Mod: 25

## 2018-02-08 ENCOUNTER — EMERGENCY (EMERGENCY)
Facility: HOSPITAL | Age: 28
LOS: 1 days | End: 2018-02-08
Attending: EMERGENCY MEDICINE
Payer: MEDICAID

## 2018-02-08 VITALS
SYSTOLIC BLOOD PRESSURE: 127 MMHG | TEMPERATURE: 98 F | WEIGHT: 156.97 LBS | OXYGEN SATURATION: 97 % | RESPIRATION RATE: 18 BRPM | DIASTOLIC BLOOD PRESSURE: 87 MMHG | HEIGHT: 62 IN | HEART RATE: 67 BPM

## 2018-02-08 DIAGNOSIS — Z98.890 OTHER SPECIFIED POSTPROCEDURAL STATES: Chronic | ICD-10-CM

## 2018-02-08 LAB
ALBUMIN SERPL ELPH-MCNC: 4.5 G/DL — SIGNIFICANT CHANGE UP (ref 3.3–5.2)
ALP SERPL-CCNC: 34 U/L — LOW (ref 40–120)
ALT FLD-CCNC: 18 U/L — SIGNIFICANT CHANGE UP
ANION GAP SERPL CALC-SCNC: 12 MMOL/L — SIGNIFICANT CHANGE UP (ref 5–17)
APPEARANCE UR: CLEAR — SIGNIFICANT CHANGE UP
APTT BLD: 44.7 SEC — HIGH (ref 27.5–37.4)
AST SERPL-CCNC: 19 U/L — SIGNIFICANT CHANGE UP
BACTERIA # UR AUTO: ABNORMAL
BASOPHILS # BLD AUTO: 0 K/UL — SIGNIFICANT CHANGE UP (ref 0–0.2)
BASOPHILS NFR BLD AUTO: 0.3 % — SIGNIFICANT CHANGE UP (ref 0–2)
BILIRUB SERPL-MCNC: 0.4 MG/DL — SIGNIFICANT CHANGE UP (ref 0.4–2)
BILIRUB UR-MCNC: NEGATIVE — SIGNIFICANT CHANGE UP
BUN SERPL-MCNC: 15 MG/DL — SIGNIFICANT CHANGE UP (ref 8–20)
CALCIUM SERPL-MCNC: 9.4 MG/DL — SIGNIFICANT CHANGE UP (ref 8.6–10.2)
CHLORIDE SERPL-SCNC: 100 MMOL/L — SIGNIFICANT CHANGE UP (ref 98–107)
CO2 SERPL-SCNC: 30 MMOL/L — HIGH (ref 22–29)
COLOR SPEC: YELLOW — SIGNIFICANT CHANGE UP
CREAT SERPL-MCNC: 0.8 MG/DL — SIGNIFICANT CHANGE UP (ref 0.5–1.3)
DIFF PNL FLD: NEGATIVE — SIGNIFICANT CHANGE UP
EOSINOPHIL # BLD AUTO: 0.1 K/UL — SIGNIFICANT CHANGE UP (ref 0–0.5)
EOSINOPHIL NFR BLD AUTO: 2.3 % — SIGNIFICANT CHANGE UP (ref 0–5)
EPI CELLS # UR: SIGNIFICANT CHANGE UP
GLUCOSE SERPL-MCNC: 96 MG/DL — SIGNIFICANT CHANGE UP (ref 70–115)
GLUCOSE UR QL: NEGATIVE MG/DL — SIGNIFICANT CHANGE UP
HCT VFR BLD CALC: 44.1 % — SIGNIFICANT CHANGE UP (ref 42–52)
HGB BLD-MCNC: 14.7 G/DL — SIGNIFICANT CHANGE UP (ref 14–18)
INR BLD: 1.84 RATIO — HIGH (ref 0.88–1.16)
KETONES UR-MCNC: NEGATIVE — SIGNIFICANT CHANGE UP
LEUKOCYTE ESTERASE UR-ACNC: ABNORMAL
LIDOCAIN IGE QN: 27 U/L — SIGNIFICANT CHANGE UP (ref 22–51)
LYMPHOCYTES # BLD AUTO: 2.4 K/UL — SIGNIFICANT CHANGE UP (ref 1–4.8)
LYMPHOCYTES # BLD AUTO: 39.3 % — SIGNIFICANT CHANGE UP (ref 20–55)
MCHC RBC-ENTMCNC: 28.3 PG — SIGNIFICANT CHANGE UP (ref 27–31)
MCHC RBC-ENTMCNC: 33.3 G/DL — SIGNIFICANT CHANGE UP (ref 32–36)
MCV RBC AUTO: 84.8 FL — SIGNIFICANT CHANGE UP (ref 80–94)
MONOCYTES # BLD AUTO: 0.6 K/UL — SIGNIFICANT CHANGE UP (ref 0–0.8)
MONOCYTES NFR BLD AUTO: 10.1 % — HIGH (ref 3–10)
NEUTROPHILS # BLD AUTO: 2.9 K/UL — SIGNIFICANT CHANGE UP (ref 1.8–8)
NEUTROPHILS NFR BLD AUTO: 47.8 % — SIGNIFICANT CHANGE UP (ref 37–73)
NITRITE UR-MCNC: NEGATIVE — SIGNIFICANT CHANGE UP
PH UR: 6.5 — SIGNIFICANT CHANGE UP (ref 5–8)
PLATELET # BLD AUTO: 207 K/UL — SIGNIFICANT CHANGE UP (ref 150–400)
POTASSIUM SERPL-MCNC: 4.1 MMOL/L — SIGNIFICANT CHANGE UP (ref 3.5–5.3)
POTASSIUM SERPL-SCNC: 4.1 MMOL/L — SIGNIFICANT CHANGE UP (ref 3.5–5.3)
PROT SERPL-MCNC: 7.6 G/DL — SIGNIFICANT CHANGE UP (ref 6.6–8.7)
PROT UR-MCNC: 15 MG/DL
PROTHROM AB SERPL-ACNC: 20.5 SEC — HIGH (ref 9.8–12.7)
RBC # BLD: 5.2 M/UL — SIGNIFICANT CHANGE UP (ref 4.6–6.2)
RBC # FLD: 13.8 % — SIGNIFICANT CHANGE UP (ref 11–15.6)
SODIUM SERPL-SCNC: 142 MMOL/L — SIGNIFICANT CHANGE UP (ref 135–145)
SP GR SPEC: 1.02 — SIGNIFICANT CHANGE UP (ref 1.01–1.02)
UROBILINOGEN FLD QL: NEGATIVE MG/DL — SIGNIFICANT CHANGE UP
WBC # BLD: 6.1 K/UL — SIGNIFICANT CHANGE UP (ref 4.8–10.8)
WBC # FLD AUTO: 6.1 K/UL — SIGNIFICANT CHANGE UP (ref 4.8–10.8)
WBC UR QL: ABNORMAL

## 2018-02-08 PROCEDURE — 85027 COMPLETE CBC AUTOMATED: CPT

## 2018-02-08 PROCEDURE — 99284 EMERGENCY DEPT VISIT MOD MDM: CPT

## 2018-02-08 PROCEDURE — 80053 COMPREHEN METABOLIC PANEL: CPT

## 2018-02-08 PROCEDURE — 85730 THROMBOPLASTIN TIME PARTIAL: CPT

## 2018-02-08 PROCEDURE — 81001 URINALYSIS AUTO W/SCOPE: CPT

## 2018-02-08 PROCEDURE — 36415 COLL VENOUS BLD VENIPUNCTURE: CPT

## 2018-02-08 PROCEDURE — 74177 CT ABD & PELVIS W/CONTRAST: CPT

## 2018-02-08 PROCEDURE — 83690 ASSAY OF LIPASE: CPT

## 2018-02-08 PROCEDURE — 74177 CT ABD & PELVIS W/CONTRAST: CPT | Mod: 26

## 2018-02-08 PROCEDURE — 85610 PROTHROMBIN TIME: CPT

## 2018-02-08 PROCEDURE — 99284 EMERGENCY DEPT VISIT MOD MDM: CPT | Mod: 25

## 2018-02-08 RX ORDER — SODIUM CHLORIDE 9 MG/ML
3 INJECTION INTRAMUSCULAR; INTRAVENOUS; SUBCUTANEOUS ONCE
Qty: 0 | Refills: 0 | Status: COMPLETED | OUTPATIENT
Start: 2018-02-08 | End: 2018-02-08

## 2018-02-08 RX ORDER — CEPHALEXIN 500 MG
1 CAPSULE ORAL
Qty: 14 | Refills: 0
Start: 2018-02-08 | End: 2018-02-14

## 2018-02-08 RX ADMIN — SODIUM CHLORIDE 3 MILLILITER(S): 9 INJECTION INTRAMUSCULAR; INTRAVENOUS; SUBCUTANEOUS at 12:57

## 2018-02-08 NOTE — ED STATDOCS - CARE PLAN
Principal Discharge DX:	UTI (urinary tract infection)  Secondary Diagnosis:	Abdominal pain Principal Discharge DX:	UTI (urinary tract infection)  Secondary Diagnosis:	Abdominal pain  Secondary Diagnosis:	Cholelithiases

## 2018-02-08 NOTE — ED STATDOCS - OBJECTIVE STATEMENT
28 y/o M pt hx of stroke, CHF, HTN, Afib presents to ED c/o abdominal pain radiating to left back x 2 days. No vomiting, trouble urinating. Last BM this morning 26 y/o M pt hx of stroke, CHF, HTN, Afib presents to ED c/o abdominal pain radiating to left back x 2 days. No vomiting, trouble urinating. Last BM this morning. His doc sent him in to rule out obstruction.

## 2018-02-08 NOTE — ED STATDOCS - GASTROINTESTINAL, MLM
no distension. minimal tenderness LLQ no distension. minimal tenderness LLQ. No guarding or rebound. no distension. minimal tenderness LLQ. No guarding or rebound.  No right sided abdominal pain.

## 2018-02-08 NOTE — ED ADULT NURSE NOTE - OBJECTIVE STATEMENT
Patient arrived to ED today with c/o left sided abdominal pain.  Patient denies n/v/d.  Patient had BM today.

## 2018-02-08 NOTE — ED ADULT TRIAGE NOTE - CHIEF COMPLAINT QUOTE
pt c/o left sided abd pain for 5 days pt c/o left sided abd pain for 5 days seen by his PMD told to come to ed for ct scan of abd

## 2018-03-26 ENCOUNTER — APPOINTMENT (OUTPATIENT)
Dept: CARDIOLOGY | Facility: CLINIC | Age: 28
End: 2018-03-26

## 2018-11-21 ENCOUNTER — APPOINTMENT (OUTPATIENT)
Dept: ELECTROPHYSIOLOGY | Facility: CLINIC | Age: 28
End: 2018-11-21

## 2018-11-28 ENCOUNTER — APPOINTMENT (OUTPATIENT)
Dept: ELECTROPHYSIOLOGY | Facility: CLINIC | Age: 28
End: 2018-11-28
Payer: MEDICAID

## 2018-11-28 VITALS
BODY MASS INDEX: 28.89 KG/M2 | SYSTOLIC BLOOD PRESSURE: 158 MMHG | WEIGHT: 157 LBS | HEIGHT: 62 IN | DIASTOLIC BLOOD PRESSURE: 115 MMHG

## 2018-11-28 PROCEDURE — 93282 PRGRMG EVAL IMPLANTABLE DFB: CPT

## 2018-11-28 PROCEDURE — 93000 ELECTROCARDIOGRAM COMPLETE: CPT | Mod: 59

## 2018-11-28 PROCEDURE — 99214 OFFICE O/P EST MOD 30 MIN: CPT | Mod: 25

## 2018-11-28 RX ORDER — TRIAMTERENE AND HYDROCHLOROTHIAZIDE 25; 37.5 MG/1; MG/1
37.5-25 TABLET ORAL
Qty: 30 | Refills: 0 | Status: DISCONTINUED | COMMUNITY
Start: 2017-07-07 | End: 2018-11-28

## 2018-11-30 NOTE — DISCUSSION/SUMMARY
[AICD Function Normal] : normal AICD function [FreeTextEntry1] : 29 y/o M with history of NICM s/p single chamber AICD for primary prevention, HTN, CVA , s/p ASD closure who is here for routine f/u and remains asymptomatic.  Ran out of Entresto last week but has otherwise remained compliant with medical therapy.\par \par 1) NICM (LVEF 20%, 2013) with subsequent improvement (LVEF 50-55%, 5/2017):  Most recent TTE (12/2017) with LVEF 35-40% and subsequently started on Entresto.  Euvolemic on exam and asymptomatic.  Plan for f/up TTE.  Continue Entresto and Coreg.\par 2) HTN:  Uncontrolled today after running out of Entresto last week.  Restart today and continue Coreg 6.25 BID.  BP check next week.\par 3) ICD:  Normal function.  No VT/VF events.  Prior SVT event appears c/w brief SVT vs AF with RVR (8/2017) no recurrent events in >1 yr. Remains on a/c with Coumdain\par 4) On Coumadin with INR followed by PCP. Denies bleeding events\par \par Device check in 3-4 months\par F/up with Dr. Pino unless sooner is needed based on TTE findings or symptom changes\par \par D/w Dr. Pino\par \par

## 2018-11-30 NOTE — PROCEDURE
[No] : not [NSR] : normal sinus rhythm [See Device Printout] : See device printout [ICD] : Implantable cardioverter-defibrillator [[employer]] : [employer] [VVI] : VVI [Normal] : The battery status is normal. [Lead Imp:  ___ohms] : lead impedance was [unfilled] ohms [Sensing Amplitude ___mv] : sensing amplitude was [unfilled] mv [___V @] : [unfilled] V [___ ms] : [unfilled] ms [None] : none [Pace ___ %] : Pace [unfilled]% [de-identified] : Fortify [de-identified] : [id number] [de-identified] : 3/18/2013 [de-identified] : 40 [de-identified] : Remote SVT episode (8/2017) c/w bief SVT vs AF with RVR @ 193bpm, < 30s duration

## 2018-11-30 NOTE — HISTORY OF PRESENT ILLNESS
[de-identified] : 29 y/o M with pmhx of NICM (LVEF 20%) diagnosed in 2012 s/p single chamber ICD for primary prevention (3/2013), CVA secondary to LV hypokinesis and presence of ASD s/p percutaneous ASD closure (2014, Dr. Pierce).  Hes remained compliant with medical therapy\par Follow up TTE, 5/2017, showed significant improvement in LV function EF 50-55%.\par Subsequently presented to [facility] with SOB and cough (11/30-12/3/2017).  TTE during admission showed LVEF 35-40%. CT showed possible bibasilar infectious or inflammatory pneumonitis and subsegmental atelectasis. Procalcitonin level were not elevated. SOB resolved during hospital stay and he remained asymptomatic on subsequent f/up.\par Started on Entresto 24-26 BID (12/2017) and remains on Coreg.\par \par Presents for f/up and device check. Denies recent symptoms including CP, SOB, CISSE, dizziness, syncope, orthopnea or LE edema.\par

## 2018-11-30 NOTE — PHYSICAL EXAM
[General Appearance - Well Developed] : well developed [Normal Appearance] : normal appearance [General Appearance - Well Nourished] : well nourished [Heart Rate And Rhythm] : heart rate and rhythm were normal [Heart Sounds] : normal S1 and S2 [Murmurs] : no murmurs present [Arterial Pulses Normal] : the arterial pulses were normal [Edema] : no peripheral edema present [] : no respiratory distress [Respiration, Rhythm And Depth] : normal respiratory rhythm and effort [Exaggerated Use Of Accessory Muscles For Inspiration] : no accessory muscle use [Auscultation Breath Sounds / Voice Sounds] : lungs were clear to auscultation bilaterally [Left Infraclavicular] : left infraclavicular area [Clean] : clean [Dry] : dry [Well-Healed] : well-healed [Bowel Sounds] : normal bowel sounds [Abdomen Soft] : soft [Nail Clubbing] : no clubbing of the fingernails [Warm] : not warm [Tender] : not tender [Indurated] : not indurated

## 2018-12-05 ENCOUNTER — APPOINTMENT (OUTPATIENT)
Dept: CARDIOLOGY | Facility: CLINIC | Age: 28
End: 2018-12-05

## 2018-12-17 ENCOUNTER — APPOINTMENT (OUTPATIENT)
Dept: CARDIOLOGY | Facility: CLINIC | Age: 28
End: 2018-12-17

## 2019-02-25 ENCOUNTER — APPOINTMENT (OUTPATIENT)
Dept: ELECTROPHYSIOLOGY | Facility: CLINIC | Age: 29
End: 2019-02-25

## 2019-02-25 ENCOUNTER — APPOINTMENT (OUTPATIENT)
Dept: CARDIOLOGY | Facility: CLINIC | Age: 29
End: 2019-02-25

## 2019-03-30 ENCOUNTER — EMERGENCY (EMERGENCY)
Facility: HOSPITAL | Age: 29
LOS: 1 days | Discharge: HOME | End: 2019-03-30
Attending: EMERGENCY MEDICINE
Payer: MEDICAID

## 2019-03-30 VITALS
SYSTOLIC BLOOD PRESSURE: 118 MMHG | HEART RATE: 82 BPM | OXYGEN SATURATION: 97 % | TEMPERATURE: 98 F | RESPIRATION RATE: 18 BRPM | DIASTOLIC BLOOD PRESSURE: 80 MMHG

## 2019-03-30 VITALS
TEMPERATURE: 98 F | OXYGEN SATURATION: 96 % | HEIGHT: 62 IN | HEART RATE: 84 BPM | WEIGHT: 160.06 LBS | SYSTOLIC BLOOD PRESSURE: 118 MMHG | RESPIRATION RATE: 19 BRPM | DIASTOLIC BLOOD PRESSURE: 82 MMHG

## 2019-03-30 DIAGNOSIS — Z98.890 OTHER SPECIFIED POSTPROCEDURAL STATES: Chronic | ICD-10-CM

## 2019-03-30 LAB
ALBUMIN SERPL ELPH-MCNC: 4.2 G/DL — SIGNIFICANT CHANGE UP (ref 3.3–5.2)
ALP SERPL-CCNC: 69 U/L — SIGNIFICANT CHANGE UP (ref 40–120)
ALT FLD-CCNC: 15 U/L — SIGNIFICANT CHANGE UP
ANION GAP SERPL CALC-SCNC: 12 MMOL/L — SIGNIFICANT CHANGE UP (ref 5–17)
ANISOCYTOSIS BLD QL: SLIGHT — SIGNIFICANT CHANGE UP
APPEARANCE UR: CLEAR — SIGNIFICANT CHANGE UP
APTT BLD: 87.2 SEC — HIGH (ref 27.5–36.3)
AST SERPL-CCNC: 15 U/L — SIGNIFICANT CHANGE UP
BACTERIA # UR AUTO: ABNORMAL
BASOPHILS # BLD AUTO: 0 K/UL — SIGNIFICANT CHANGE UP (ref 0–0.2)
BASOPHILS NFR BLD AUTO: 0 % — SIGNIFICANT CHANGE UP (ref 0–2)
BILIRUB SERPL-MCNC: 0.8 MG/DL — SIGNIFICANT CHANGE UP (ref 0.4–2)
BILIRUB UR-MCNC: ABNORMAL
BLD GP AB SCN SERPL QL: SIGNIFICANT CHANGE UP
BUN SERPL-MCNC: 17 MG/DL — SIGNIFICANT CHANGE UP (ref 8–20)
CALCIUM SERPL-MCNC: 9.1 MG/DL — SIGNIFICANT CHANGE UP (ref 8.6–10.2)
CHLORIDE SERPL-SCNC: 99 MMOL/L — SIGNIFICANT CHANGE UP (ref 98–107)
CO2 SERPL-SCNC: 25 MMOL/L — SIGNIFICANT CHANGE UP (ref 22–29)
COLOR SPEC: YELLOW — SIGNIFICANT CHANGE UP
CREAT SERPL-MCNC: 0.76 MG/DL — SIGNIFICANT CHANGE UP (ref 0.5–1.3)
DIFF PNL FLD: ABNORMAL
EOSINOPHIL # BLD AUTO: 0.1 K/UL — SIGNIFICANT CHANGE UP (ref 0–0.5)
EOSINOPHIL NFR BLD AUTO: 1 % — SIGNIFICANT CHANGE UP (ref 0–6)
EPI CELLS # UR: ABNORMAL
GLUCOSE SERPL-MCNC: 92 MG/DL — SIGNIFICANT CHANGE UP (ref 70–115)
GLUCOSE UR QL: NEGATIVE MG/DL — SIGNIFICANT CHANGE UP
HCT VFR BLD CALC: 45 % — SIGNIFICANT CHANGE UP (ref 42–52)
HGB BLD-MCNC: 15.2 G/DL — SIGNIFICANT CHANGE UP (ref 14–18)
INR BLD: 6.93 RATIO — CRITICAL HIGH (ref 0.88–1.16)
KETONES UR-MCNC: ABNORMAL
LEUKOCYTE ESTERASE UR-ACNC: ABNORMAL
LYMPHOCYTES # BLD AUTO: 1.6 K/UL — SIGNIFICANT CHANGE UP (ref 1–4.8)
LYMPHOCYTES # BLD AUTO: 9 % — LOW (ref 20–55)
MCHC RBC-ENTMCNC: 28 PG — SIGNIFICANT CHANGE UP (ref 27–31)
MCHC RBC-ENTMCNC: 33.8 G/DL — SIGNIFICANT CHANGE UP (ref 32–36)
MCV RBC AUTO: 82.9 FL — SIGNIFICANT CHANGE UP (ref 80–94)
MONOCYTES # BLD AUTO: 1.4 K/UL — HIGH (ref 0–0.8)
MONOCYTES NFR BLD AUTO: 9 % — SIGNIFICANT CHANGE UP (ref 3–10)
NEUTROPHILS # BLD AUTO: 12 K/UL — HIGH (ref 1.8–8)
NEUTROPHILS NFR BLD AUTO: 78 % — HIGH (ref 37–73)
NEUTS BAND # BLD: 1 % — SIGNIFICANT CHANGE UP (ref 0–8)
NITRITE UR-MCNC: NEGATIVE — SIGNIFICANT CHANGE UP
PH UR: 6 — SIGNIFICANT CHANGE UP (ref 5–8)
PLAT MORPH BLD: NORMAL — SIGNIFICANT CHANGE UP
PLATELET # BLD AUTO: 195 K/UL — SIGNIFICANT CHANGE UP (ref 150–400)
POIKILOCYTOSIS BLD QL AUTO: SLIGHT — SIGNIFICANT CHANGE UP
POTASSIUM SERPL-MCNC: 4.2 MMOL/L — SIGNIFICANT CHANGE UP (ref 3.5–5.3)
POTASSIUM SERPL-SCNC: 4.2 MMOL/L — SIGNIFICANT CHANGE UP (ref 3.5–5.3)
PROT SERPL-MCNC: 8.3 G/DL — SIGNIFICANT CHANGE UP (ref 6.6–8.7)
PROT UR-MCNC: 100 MG/DL
PROTHROM AB SERPL-ACNC: 84.6 SEC — HIGH (ref 10–12.9)
RBC # BLD: 5.43 M/UL — SIGNIFICANT CHANGE UP (ref 4.6–6.2)
RBC # FLD: 13.9 % — SIGNIFICANT CHANGE UP (ref 11–15.6)
RBC BLD AUTO: SIGNIFICANT CHANGE UP
RBC CASTS # UR COMP ASSIST: ABNORMAL /HPF (ref 0–4)
SODIUM SERPL-SCNC: 136 MMOL/L — SIGNIFICANT CHANGE UP (ref 135–145)
SP GR SPEC: 1.01 — SIGNIFICANT CHANGE UP (ref 1.01–1.02)
TYPE + AB SCN PNL BLD: SIGNIFICANT CHANGE UP
UROBILINOGEN FLD QL: 1 MG/DL
VARIANT LYMPHS # BLD: 2 % — SIGNIFICANT CHANGE UP (ref 0–6)
WBC # BLD: 15.1 K/UL — HIGH (ref 4.8–10.8)
WBC # FLD AUTO: 15.1 K/UL — HIGH (ref 4.8–10.8)
WBC UR QL: ABNORMAL

## 2019-03-30 PROCEDURE — 86901 BLOOD TYPING SEROLOGIC RH(D): CPT

## 2019-03-30 PROCEDURE — 99283 EMERGENCY DEPT VISIT LOW MDM: CPT

## 2019-03-30 PROCEDURE — 85027 COMPLETE CBC AUTOMATED: CPT

## 2019-03-30 PROCEDURE — 99284 EMERGENCY DEPT VISIT MOD MDM: CPT

## 2019-03-30 PROCEDURE — 80053 COMPREHEN METABOLIC PANEL: CPT

## 2019-03-30 PROCEDURE — 85730 THROMBOPLASTIN TIME PARTIAL: CPT

## 2019-03-30 PROCEDURE — 86900 BLOOD TYPING SEROLOGIC ABO: CPT

## 2019-03-30 PROCEDURE — 85610 PROTHROMBIN TIME: CPT

## 2019-03-30 PROCEDURE — 93010 ELECTROCARDIOGRAM REPORT: CPT

## 2019-03-30 PROCEDURE — 87086 URINE CULTURE/COLONY COUNT: CPT

## 2019-03-30 PROCEDURE — 36415 COLL VENOUS BLD VENIPUNCTURE: CPT

## 2019-03-30 PROCEDURE — 87186 SC STD MICRODIL/AGAR DIL: CPT

## 2019-03-30 PROCEDURE — 93005 ELECTROCARDIOGRAM TRACING: CPT

## 2019-03-30 PROCEDURE — 81001 URINALYSIS AUTO W/SCOPE: CPT

## 2019-03-30 PROCEDURE — 86850 RBC ANTIBODY SCREEN: CPT

## 2019-03-30 NOTE — ED ADULT NURSE NOTE - OBJECTIVE STATEMENT
pt was sent to the ED from [facility] urgent center for c/o of blood in the urine since Tuesday.   pt stated that is on coumadin and urgent care stated that INR is 7.2.  pt is A/Ox3 and denies any pain at this moment.   pt doesn't seem to be in any distress and mother at the bedside.

## 2019-03-30 NOTE — ED ADULT TRIAGE NOTE - CHIEF COMPLAINT QUOTE
pt reports blood in urine since tuesday. sent from [facility] with reports pf INR 7.1, on coumadin.

## 2019-03-30 NOTE — ED PROVIDER NOTE - OBJECTIVE STATEMENT
Patient is a 28 year old M with PMHx of A. fib, CHF, HTN and stoke who presents to the ED complaining of hematuria x4 days and sent to ED for abnormal lab result of INR 7.1 (on coumadin). Patient is a 28 year old M with PMHx of A. fib, CHF, HTN and stoke who presents to the ED complaining of hematuria x4 days. Pt. did say that his urine cleared up today. Pt. denies any blood in stool. Stool color is brown. Pt. sent to ED for abnormal lab result of INR 7.1 (on coumadin). Pt. had blood work done this morning. Pt. states that his coumadin level last week was 3.5. No chest pain. NO SOB.

## 2019-03-30 NOTE — ED STATDOCS - PROGRESS NOTE DETAILS
Patient is a 28 year old M with PMHx of A. fib, CHF, HTN and stoke who presents to the ED complaining of hematuria x4 days and sent to ED for abnormal lab result of INR 7.1 (on coumadin).  Patient will be sent to the main ED for further evaluation.

## 2019-03-30 NOTE — ED ADULT NURSE NOTE - CHIEF COMPLAINT QUOTE
pt reports blood in urine since Tuesday. sent from [facility] with reports pf INR 7.1, on coumadin.

## 2019-03-30 NOTE — ED PROVIDER NOTE - PROGRESS NOTE DETAILS
Pt. has an INR of 6.93 with no clinically significant bleeding. Pt. will need to hold his coumadin for 2 doses and then to restart his medication and to follow up with his PMD and to have his INR checked again this week. No ABX will be given. Pt. just finished a 10 day course of Cipro yesterday for a UTI. Will wait on the cultures since pt. currently is asymptomatic.

## 2019-04-02 NOTE — ED POST DISCHARGE NOTE - DETAILS
spoke to patient will RX macrobid, finished cipro, UC resistant, denies any abdominal pain or fevers, feeling better f/u with PCP, advised to f/u with PCP for repeat UC after finishes macrobid, patient understands and agrees to proceed.

## 2019-04-06 RX ORDER — NITROFURANTOIN MACROCRYSTAL 50 MG
1 CAPSULE ORAL
Qty: 14 | Refills: 0
Start: 2019-04-06 | End: 2019-04-12

## 2019-04-24 ENCOUNTER — EMERGENCY (EMERGENCY)
Facility: HOSPITAL | Age: 29
LOS: 1 days | Discharge: HOME | End: 2019-04-24
Attending: EMERGENCY MEDICINE
Payer: MEDICAID

## 2019-04-24 VITALS
OXYGEN SATURATION: 96 % | SYSTOLIC BLOOD PRESSURE: 159 MMHG | DIASTOLIC BLOOD PRESSURE: 112 MMHG | WEIGHT: 160.06 LBS | HEART RATE: 79 BPM | RESPIRATION RATE: 20 BRPM | HEIGHT: 63 IN | TEMPERATURE: 98 F

## 2019-04-24 VITALS — SYSTOLIC BLOOD PRESSURE: 145 MMHG | DIASTOLIC BLOOD PRESSURE: 98 MMHG

## 2019-04-24 DIAGNOSIS — Z98.890 OTHER SPECIFIED POSTPROCEDURAL STATES: Chronic | ICD-10-CM

## 2019-04-24 LAB
ALBUMIN SERPL ELPH-MCNC: 4.6 G/DL — SIGNIFICANT CHANGE UP (ref 3.3–5.2)
ALP SERPL-CCNC: 45 U/L — SIGNIFICANT CHANGE UP (ref 40–120)
ALT FLD-CCNC: 24 U/L — SIGNIFICANT CHANGE UP
ANION GAP SERPL CALC-SCNC: 11 MMOL/L — SIGNIFICANT CHANGE UP (ref 5–17)
APPEARANCE UR: ABNORMAL
APTT BLD: 58.8 SEC — HIGH (ref 27.5–36.3)
AST SERPL-CCNC: 20 U/L — SIGNIFICANT CHANGE UP
BACTERIA # UR AUTO: ABNORMAL
BASOPHILS # BLD AUTO: 0 K/UL — SIGNIFICANT CHANGE UP (ref 0–0.2)
BASOPHILS NFR BLD AUTO: 0.1 % — SIGNIFICANT CHANGE UP (ref 0–2)
BILIRUB SERPL-MCNC: 0.4 MG/DL — SIGNIFICANT CHANGE UP (ref 0.4–2)
BILIRUB UR-MCNC: NEGATIVE — SIGNIFICANT CHANGE UP
BUN SERPL-MCNC: 16 MG/DL — SIGNIFICANT CHANGE UP (ref 8–20)
CALCIUM SERPL-MCNC: 9.1 MG/DL — SIGNIFICANT CHANGE UP (ref 8.6–10.2)
CHLORIDE SERPL-SCNC: 103 MMOL/L — SIGNIFICANT CHANGE UP (ref 98–107)
CO2 SERPL-SCNC: 21 MMOL/L — LOW (ref 22–29)
COLOR SPEC: ABNORMAL
CREAT SERPL-MCNC: 0.74 MG/DL — SIGNIFICANT CHANGE UP (ref 0.5–1.3)
DIFF PNL FLD: ABNORMAL
EOSINOPHIL # BLD AUTO: 0 K/UL — SIGNIFICANT CHANGE UP (ref 0–0.5)
EOSINOPHIL NFR BLD AUTO: 0.3 % — SIGNIFICANT CHANGE UP (ref 0–5)
EPI CELLS # UR: SIGNIFICANT CHANGE UP
GLUCOSE SERPL-MCNC: 121 MG/DL — HIGH (ref 70–115)
GLUCOSE UR QL: NEGATIVE MG/DL — SIGNIFICANT CHANGE UP
HCT VFR BLD CALC: 43.6 % — SIGNIFICANT CHANGE UP (ref 42–52)
HGB BLD-MCNC: 14.8 G/DL — SIGNIFICANT CHANGE UP (ref 14–18)
INR BLD: 4.49 RATIO — HIGH (ref 0.88–1.16)
KETONES UR-MCNC: ABNORMAL
LEUKOCYTE ESTERASE UR-ACNC: ABNORMAL
LYMPHOCYTES # BLD AUTO: 1.4 K/UL — SIGNIFICANT CHANGE UP (ref 1–4.8)
LYMPHOCYTES # BLD AUTO: 9.4 % — LOW (ref 20–55)
MCHC RBC-ENTMCNC: 27.8 PG — SIGNIFICANT CHANGE UP (ref 27–31)
MCHC RBC-ENTMCNC: 33.9 G/DL — SIGNIFICANT CHANGE UP (ref 32–36)
MCV RBC AUTO: 81.8 FL — SIGNIFICANT CHANGE UP (ref 80–94)
MONOCYTES # BLD AUTO: 1.1 K/UL — HIGH (ref 0–0.8)
MONOCYTES NFR BLD AUTO: 7.6 % — SIGNIFICANT CHANGE UP (ref 3–10)
NEUTROPHILS # BLD AUTO: 12 K/UL — HIGH (ref 1.8–8)
NEUTROPHILS NFR BLD AUTO: 82.3 % — HIGH (ref 37–73)
NITRITE UR-MCNC: NEGATIVE — SIGNIFICANT CHANGE UP
PH UR: 5 — SIGNIFICANT CHANGE UP (ref 5–8)
PLATELET # BLD AUTO: 202 K/UL — SIGNIFICANT CHANGE UP (ref 150–400)
POTASSIUM SERPL-MCNC: 4.5 MMOL/L — SIGNIFICANT CHANGE UP (ref 3.5–5.3)
POTASSIUM SERPL-SCNC: 4.5 MMOL/L — SIGNIFICANT CHANGE UP (ref 3.5–5.3)
PROT SERPL-MCNC: 8.3 G/DL — SIGNIFICANT CHANGE UP (ref 6.6–8.7)
PROT UR-MCNC: 100 MG/DL
PROTHROM AB SERPL-ACNC: 54.1 SEC — HIGH (ref 10–12.9)
RBC # BLD: 5.33 M/UL — SIGNIFICANT CHANGE UP (ref 4.6–6.2)
RBC # FLD: 14.6 % — SIGNIFICANT CHANGE UP (ref 11–15.6)
RBC CASTS # UR COMP ASSIST: >50 /HPF (ref 0–4)
SODIUM SERPL-SCNC: 135 MMOL/L — SIGNIFICANT CHANGE UP (ref 135–145)
SP GR SPEC: 1.02 — SIGNIFICANT CHANGE UP (ref 1.01–1.02)
UROBILINOGEN FLD QL: NEGATIVE MG/DL — SIGNIFICANT CHANGE UP
WBC # BLD: 14.6 K/UL — HIGH (ref 4.8–10.8)
WBC # FLD AUTO: 14.6 K/UL — HIGH (ref 4.8–10.8)
WBC UR QL: ABNORMAL

## 2019-04-24 PROCEDURE — 85027 COMPLETE CBC AUTOMATED: CPT

## 2019-04-24 PROCEDURE — 87086 URINE CULTURE/COLONY COUNT: CPT

## 2019-04-24 PROCEDURE — 36415 COLL VENOUS BLD VENIPUNCTURE: CPT

## 2019-04-24 PROCEDURE — 85610 PROTHROMBIN TIME: CPT

## 2019-04-24 PROCEDURE — 99284 EMERGENCY DEPT VISIT MOD MDM: CPT | Mod: 25

## 2019-04-24 PROCEDURE — 96372 THER/PROPH/DIAG INJ SC/IM: CPT

## 2019-04-24 PROCEDURE — 87491 CHLMYD TRACH DNA AMP PROBE: CPT

## 2019-04-24 PROCEDURE — 81001 URINALYSIS AUTO W/SCOPE: CPT

## 2019-04-24 PROCEDURE — 85730 THROMBOPLASTIN TIME PARTIAL: CPT

## 2019-04-24 PROCEDURE — 99284 EMERGENCY DEPT VISIT MOD MDM: CPT

## 2019-04-24 PROCEDURE — 80053 COMPREHEN METABOLIC PANEL: CPT

## 2019-04-24 PROCEDURE — 87591 N.GONORRHOEAE DNA AMP PROB: CPT

## 2019-04-24 PROCEDURE — 76870 US EXAM SCROTUM: CPT | Mod: 26

## 2019-04-24 PROCEDURE — 76870 US EXAM SCROTUM: CPT

## 2019-04-24 RX ORDER — IBUPROFEN 200 MG
600 TABLET ORAL ONCE
Qty: 0 | Refills: 0 | Status: COMPLETED | OUTPATIENT
Start: 2019-04-24 | End: 2019-04-24

## 2019-04-24 RX ORDER — CEFTRIAXONE 500 MG/1
250 INJECTION, POWDER, FOR SOLUTION INTRAMUSCULAR; INTRAVENOUS ONCE
Qty: 0 | Refills: 0 | Status: COMPLETED | OUTPATIENT
Start: 2019-04-24 | End: 2019-04-24

## 2019-04-24 RX ORDER — ACETAMINOPHEN 500 MG
650 TABLET ORAL ONCE
Qty: 0 | Refills: 0 | Status: COMPLETED | OUTPATIENT
Start: 2019-04-24 | End: 2019-04-24

## 2019-04-24 RX ORDER — TRAMADOL HYDROCHLORIDE 50 MG/1
50 TABLET ORAL ONCE
Qty: 0 | Refills: 0 | Status: DISCONTINUED | OUTPATIENT
Start: 2019-04-24 | End: 2019-04-24

## 2019-04-24 RX ADMIN — Medication 600 MILLIGRAM(S): at 20:12

## 2019-04-24 RX ADMIN — Medication 650 MILLIGRAM(S): at 20:11

## 2019-04-24 RX ADMIN — CEFTRIAXONE 250 MILLIGRAM(S): 500 INJECTION, POWDER, FOR SOLUTION INTRAMUSCULAR; INTRAVENOUS at 20:11

## 2019-04-24 RX ADMIN — TRAMADOL HYDROCHLORIDE 50 MILLIGRAM(S): 50 TABLET ORAL at 20:11

## 2019-04-24 NOTE — ED STATDOCS - CONSTITUTIONAL, MLM
normal... well appearing, well nourished, and in no apparent distress. chronic speech impediment due to cleft lip, hearing aid in place

## 2019-04-24 NOTE — ED STATDOCS - OBJECTIVE STATEMENT
Pt is a 29 y/o M, with PMHx of afib on coumadin, CHF, CVA, TIA, and HTN, presenting to the ED with c/o left testicular pain, onset 1 hours prior to arrival. Pain is worse with movement. Pt states the pain began while walking in the park with his nephew. Denies urinary sxs, abd pain, fever, and discharge or swelling. Denies trauma to the  region. Pt was recently treated for a UTI, that presented with hematuria. Completed two abx, with improvement in sxs.

## 2019-04-24 NOTE — ED STATDOCS - GENITOURINARY TENDERNESS, MLM
uncircumcised, normal appearing penis, right testicle is normal, mild erythema to left testicular with tenderness to the posterior and anterior testicle, but the hemiscrotum is soft, not firm.

## 2019-04-24 NOTE — ED STATDOCS - PMH
Atrial fibrillation, unspecified type    CHF (congestive heart failure)    Cleft lip    CVA (cerebral vascular accident)    HTN (hypertension)    TIA (transient ischemic attack)

## 2019-04-24 NOTE — ED STATDOCS - ATTENDING CONTRIBUTION TO CARE
I, Frank De Guzman, performed the initial face to face bedside interview with this patient regarding history of present illness, review of symptoms and relevant past medical, social and family history.  I completed an independent physical examination.  I was the initial provider who evaluated this patient. I have signed out the follow up of any pending tests (i.e. labs, radiological studies) to the ACP.  I have communicated the patient’s plan of care and disposition with the ACP.

## 2019-04-24 NOTE — ED STATDOCS - PROGRESS NOTE DETAILS
PA NOTE: PT evaluated by intake physician. HPI/PE/ROS as noted above. Will follow up plan per intake physician. Pt with left sided epididymitis, will treat with rocephin 250 mg IM here and d/c on doxy. PT also has elevated INR of 4.49, no evidence of bleeding noted, advised to withold 1 dose of coumadin and f/u with PCP. PA NOTE: PT evaluated by intake physician. HPI/PE/ROS as noted above. Will follow up plan per intake physician. Pt with left sided epididymitis, will treat with rocephin 250 mg IM here and d/c on doxy. PT also has elevated INR of 4.49,  advised to withhold 1 dose of coumadin and f/u with PCP. PA NOTE: PT evaluated by intake physician. HPI/PE/ROS as noted above. Will follow up plan per intake physician. Pt with left sided epididymitis, will treat with rocephin 250 mg IM here and d/c on doxy. PT also has elevated INR of 4.49,  advised to withhold 2 dose of coumadin and f/u with PCP.

## 2019-04-25 LAB
CULTURE RESULTS: SIGNIFICANT CHANGE UP
SPECIMEN SOURCE: SIGNIFICANT CHANGE UP

## 2019-05-11 ENCOUNTER — EMERGENCY (EMERGENCY)
Facility: HOSPITAL | Age: 29
LOS: 1 days | Discharge: HOME | End: 2019-05-11
Attending: EMERGENCY MEDICINE
Payer: MEDICAID

## 2019-05-11 VITALS
SYSTOLIC BLOOD PRESSURE: 150 MMHG | TEMPERATURE: 97 F | DIASTOLIC BLOOD PRESSURE: 106 MMHG | HEART RATE: 78 BPM | RESPIRATION RATE: 18 BRPM | OXYGEN SATURATION: 97 %

## 2019-05-11 VITALS
DIASTOLIC BLOOD PRESSURE: 138 MMHG | WEIGHT: 149.91 LBS | HEART RATE: 81 BPM | HEIGHT: 63 IN | OXYGEN SATURATION: 97 % | TEMPERATURE: 98 F | RESPIRATION RATE: 18 BRPM | SYSTOLIC BLOOD PRESSURE: 167 MMHG

## 2019-05-11 DIAGNOSIS — Z98.890 OTHER SPECIFIED POSTPROCEDURAL STATES: Chronic | ICD-10-CM

## 2019-05-11 LAB
APPEARANCE UR: ABNORMAL
BACTERIA # UR AUTO: ABNORMAL
BILIRUB UR-MCNC: NEGATIVE — SIGNIFICANT CHANGE UP
COLOR SPEC: YELLOW — SIGNIFICANT CHANGE UP
COMMENT - URINE: SIGNIFICANT CHANGE UP
DIFF PNL FLD: ABNORMAL
EPI CELLS # UR: SIGNIFICANT CHANGE UP
GLUCOSE UR QL: NEGATIVE MG/DL — SIGNIFICANT CHANGE UP
KETONES UR-MCNC: NEGATIVE — SIGNIFICANT CHANGE UP
LEUKOCYTE ESTERASE UR-ACNC: ABNORMAL
NITRITE UR-MCNC: NEGATIVE — SIGNIFICANT CHANGE UP
PH UR: 6 — SIGNIFICANT CHANGE UP (ref 5–8)
PROT UR-MCNC: 100 MG/DL
RBC CASTS # UR COMP ASSIST: >50 /HPF (ref 0–4)
SP GR SPEC: 1.02 — SIGNIFICANT CHANGE UP (ref 1.01–1.02)
UROBILINOGEN FLD QL: NEGATIVE MG/DL — SIGNIFICANT CHANGE UP
WBC UR QL: ABNORMAL

## 2019-05-11 PROCEDURE — 99284 EMERGENCY DEPT VISIT MOD MDM: CPT | Mod: 25

## 2019-05-11 PROCEDURE — 96372 THER/PROPH/DIAG INJ SC/IM: CPT

## 2019-05-11 PROCEDURE — 81001 URINALYSIS AUTO W/SCOPE: CPT

## 2019-05-11 PROCEDURE — 51798 US URINE CAPACITY MEASURE: CPT

## 2019-05-11 PROCEDURE — 99284 EMERGENCY DEPT VISIT MOD MDM: CPT

## 2019-05-11 PROCEDURE — 87491 CHLMYD TRACH DNA AMP PROBE: CPT

## 2019-05-11 PROCEDURE — 87186 SC STD MICRODIL/AGAR DIL: CPT

## 2019-05-11 PROCEDURE — 87591 N.GONORRHOEAE DNA AMP PROB: CPT

## 2019-05-11 PROCEDURE — 87086 URINE CULTURE/COLONY COUNT: CPT

## 2019-05-11 PROCEDURE — 76870 US EXAM SCROTUM: CPT | Mod: 26

## 2019-05-11 PROCEDURE — 76870 US EXAM SCROTUM: CPT

## 2019-05-11 RX ORDER — CEFPODOXIME PROXETIL 100 MG
200 TABLET ORAL ONCE
Refills: 0 | Status: COMPLETED | OUTPATIENT
Start: 2019-05-11 | End: 2019-05-11

## 2019-05-11 RX ORDER — CEFPODOXIME PROXETIL 100 MG
1 TABLET ORAL
Qty: 20 | Refills: 0
Start: 2019-05-11 | End: 2019-05-20

## 2019-05-11 RX ORDER — KETOROLAC TROMETHAMINE 30 MG/ML
15 SYRINGE (ML) INJECTION ONCE
Refills: 0 | Status: DISCONTINUED | OUTPATIENT
Start: 2019-05-11 | End: 2019-05-11

## 2019-05-11 RX ADMIN — Medication 15 MILLIGRAM(S): at 13:14

## 2019-05-11 RX ADMIN — Medication 200 MILLIGRAM(S): at 14:03

## 2019-05-11 NOTE — ED ADULT TRIAGE NOTE - CHIEF COMPLAINT QUOTE
Patient arrived to ED today with c/o trouble urinating for the past day.  Patient c/o lower abdominal pain.

## 2019-05-11 NOTE — ED PROVIDER NOTE - PROGRESS NOTE DETAILS
CHARLES Underwood NOTE: Reviewed lab/US with Dr. Toney, reveals persistent left sided epididymitis, will Rx vantin and have pt f/u with urologist. Patient stable for discharge, reports improvement in pain, able to urinate on own, vital signs stable, tolerating PO, ambulatory in ED.  Discussion with pt includes but is not limited to: results, plan, proper medication use and side effects, and return precautions. Patient will follow up with their PMD in 1-2 days and any specialists discussed. Advised patient to seek immediate medical attention for any new/worsening symptoms or concerns.  Patient provided with ample opportunity to ask questions which were answered to the fullest extent.  Patient given printed copies of lab/radiology results to aid in proper outpatient follow up. Patient verbalized understanding and agreement of plan.

## 2019-05-11 NOTE — ED ADULT NURSE REASSESSMENT NOTE - NS ED NURSE REASSESS COMMENT FT1
patient awake, alert, and oriented times 3, breathing unlabored.  patient states improvement with pain.  Patient able to urinate large amount as per patient.  bladder scan performed as ordered, 77ml seen on scan.  MD aware.  Will continue to monitor

## 2019-05-11 NOTE — ED PROVIDER NOTE - ATTENDING CONTRIBUTION TO CARE
I, Jumana St, independently evaluated the patient. The PA made the initial evaluation and discussed history, physical and plan with me and I agree.     30 y/o M with complicated medical history presents complaining of severe right testicular pain and urinary retention, able to urinate just drops since this morning. Patient was treated for epididymitis a month ago. On exam patient has severe TTP of the right testicle, unable to evaluate masses, no erythema or induration of the skin, bilateral inguinal TTP, able to retract foreskin, no discharge at the meatus. Will obtain US to rule out torsion, check UA (likely via straight cath - patient wants opportunity to pee first), toradol for pain and reassess.

## 2019-05-11 NOTE — ED PROVIDER NOTE - OBJECTIVE STATEMENT
30 y/o M with PMHx Afib, HTN, CVA, CHF, presents with urinary retention and dysuria x 2 days. Pt states he cannot urinate, only urinated a few drops at 7am this morning, last full urination was around 10pm last night.  Pt states he has pain to penis since this morning, no testicular pain.  No prior incidence. Was seen at [facility] on 4/24/19, dx with left sided epididymitis, tx with Rocephin IM and doxy PO, finished medications as prescribed. Pt denies recent sexual activity (4 months), never had STD. Denies fever/chills, abd pain, nausea/vomiting, flank pain, back pain, hematuria. 30 y/o M with PMHx Afib, HTN, CVA, CHF, presents with urinary retention and dysuria x 2 days. Pt states he cannot urinate, only urinated a few drops at 7am this morning, last full urination was around 10pm last night.  Pt states he has pain to tip of penis since this morning, no testicular pain.  Was seen at [facility] on 4/24/19, dx with left sided epididymitis, tx with Rocephin IM and doxy PO, finished medications as prescribed. Pt denies recent sexual activity (4 months), never had STD. Denies fever/chills, abd pain, nausea/vomiting, flank pain, back pain, hematuria. Pt reports not taking his BP medication today.

## 2019-05-11 NOTE — ED PROVIDER NOTE - CLINICAL SUMMARY MEDICAL DECISION MAKING FREE TEXT BOX
30 y/o M with urinary retention and pain in penis since this am  -Will check US to r/o torsion, UA/UC, Urine g/c, bladder scan and possible straight cath if unable to urinate  -Will follow up and re-evaluate patient

## 2019-05-11 NOTE — ED PROVIDER NOTE - CARE PROVIDER_API CALL
Ronny Hsieh (DO)  Surgery  [address] [place] [address]  Phone: [phone]  Fax: [phone]  Follow Up Time:

## 2019-05-11 NOTE — ED PROVIDER NOTE - PHYSICAL EXAMINATION
Vital signs noted, see flowsheet.  General: Well nourished/developed. In no acute distress, well appearing and non-toxic.  HEENT: Normocephalic/atraumatic.  Moist mucous membranes.  Neck: Soft and supple, full ROM without pain.  Cardiac: Regular rate and rhythm. +S1/S2. Peripheral pulses 2+ and symmetric b/l.   Respiratory: Speaking in full sentences, no evidence of respiratory distress. Lungs clear to ascultation b/l, no wheezes/rhonchi/rales/stridor.   Abdomen: Normoactive bowel sounds x 4 quadrants. Soft, non-tender, non-distended. No guarding or rebound tenderness. No suprapubic tenderness.  Back: Spine midline and non-tender. No CVAT.  Skin: Normal color for race, no evidence of rash, ecchymosis, cyanosis or jaundice.   Lymph: No cervical lymphadenopathy  Neuro: Awake, alert and oriented to person/place/time/situation.  : MALE CHAPERONE ZENAIDA SOTO, EXAM PERFORMED WITH MD ROWE- severe TTP of the right testicle, unable to evaluate masses, no erythema or induration of the skin, bilateral inguinal TTP, able to retract foreskin, no discharge at the meatus.

## 2019-05-11 NOTE — ED ADULT NURSE NOTE - NS_ED_NURSE_TEACHING_TOPIC_ED_A_ED
patient understands symptoms to return to ED as provided on instructions.  patient understands need to follow up with urology as provided o instructions.  patient did not take HTN meds today, understands to take medication as soon as he gets home.

## 2019-05-12 PROBLEM — Q36.9 CLEFT LIP, UNILATERAL: Chronic | Status: ACTIVE | Noted: 2019-04-25

## 2019-05-13 LAB
-  AMIKACIN: SIGNIFICANT CHANGE UP
-  AMPICILLIN/SULBACTAM: SIGNIFICANT CHANGE UP
-  AMPICILLIN: SIGNIFICANT CHANGE UP
-  AZTREONAM: SIGNIFICANT CHANGE UP
-  CEFAZOLIN: SIGNIFICANT CHANGE UP
-  CEFEPIME: SIGNIFICANT CHANGE UP
-  CEFOXITIN: SIGNIFICANT CHANGE UP
-  CEFTRIAXONE: SIGNIFICANT CHANGE UP
-  CIPROFLOXACIN: SIGNIFICANT CHANGE UP
-  ERTAPENEM: SIGNIFICANT CHANGE UP
-  GENTAMICIN: SIGNIFICANT CHANGE UP
-  IMIPENEM: SIGNIFICANT CHANGE UP
-  LEVOFLOXACIN: SIGNIFICANT CHANGE UP
-  MEROPENEM: SIGNIFICANT CHANGE UP
-  NITROFURANTOIN: SIGNIFICANT CHANGE UP
-  PIPERACILLIN/TAZOBACTAM: SIGNIFICANT CHANGE UP
-  TIGECYCLINE: SIGNIFICANT CHANGE UP
-  TOBRAMYCIN: SIGNIFICANT CHANGE UP
-  TRIMETHOPRIM/SULFAMETHOXAZOLE: SIGNIFICANT CHANGE UP
C TRACH RRNA SPEC QL NAA+PROBE: SIGNIFICANT CHANGE UP
CULTURE RESULTS: SIGNIFICANT CHANGE UP
METHOD TYPE: SIGNIFICANT CHANGE UP
N GONORRHOEA RRNA SPEC QL NAA+PROBE: SIGNIFICANT CHANGE UP
ORGANISM # SPEC MICROSCOPIC CNT: SIGNIFICANT CHANGE UP
ORGANISM # SPEC MICROSCOPIC CNT: SIGNIFICANT CHANGE UP
SPECIMEN SOURCE: SIGNIFICANT CHANGE UP
SPECIMEN SOURCE: SIGNIFICANT CHANGE UP

## 2019-05-15 ENCOUNTER — APPOINTMENT (OUTPATIENT)
Dept: UROLOGY | Facility: CLINIC | Age: 29
End: 2019-05-15

## 2019-06-27 ENCOUNTER — APPOINTMENT (OUTPATIENT)
Dept: CARDIOLOGY | Facility: CLINIC | Age: 29
End: 2019-06-27

## 2019-08-16 ENCOUNTER — EMERGENCY (EMERGENCY)
Facility: HOSPITAL | Age: 29
LOS: 1 days | Discharge: HOME | End: 2019-08-16
Attending: EMERGENCY MEDICINE
Payer: MEDICAID

## 2019-08-16 VITALS — WEIGHT: 149.91 LBS | HEIGHT: 63 IN

## 2019-08-16 VITALS
OXYGEN SATURATION: 99 % | SYSTOLIC BLOOD PRESSURE: 146 MMHG | DIASTOLIC BLOOD PRESSURE: 99 MMHG | HEART RATE: 70 BPM | RESPIRATION RATE: 18 BRPM

## 2019-08-16 DIAGNOSIS — Z98.890 OTHER SPECIFIED POSTPROCEDURAL STATES: Chronic | ICD-10-CM

## 2019-08-16 LAB
ALBUMIN SERPL ELPH-MCNC: 4.4 G/DL — SIGNIFICANT CHANGE UP (ref 3.3–5.2)
ALP SERPL-CCNC: 40 U/L — SIGNIFICANT CHANGE UP (ref 40–120)
ALT FLD-CCNC: 14 U/L — SIGNIFICANT CHANGE UP
ANION GAP SERPL CALC-SCNC: 11 MMOL/L — SIGNIFICANT CHANGE UP (ref 5–17)
APPEARANCE UR: CLEAR — SIGNIFICANT CHANGE UP
APTT BLD: 68.7 SEC — HIGH (ref 27.5–36.3)
AST SERPL-CCNC: 25 U/L — SIGNIFICANT CHANGE UP
BACTERIA # UR AUTO: ABNORMAL
BASOPHILS # BLD AUTO: 0.03 K/UL — SIGNIFICANT CHANGE UP (ref 0–0.2)
BASOPHILS NFR BLD AUTO: 0.4 % — SIGNIFICANT CHANGE UP (ref 0–2)
BILIRUB SERPL-MCNC: 0.5 MG/DL — SIGNIFICANT CHANGE UP (ref 0.4–2)
BILIRUB UR-MCNC: NEGATIVE — SIGNIFICANT CHANGE UP
BUN SERPL-MCNC: 11 MG/DL — SIGNIFICANT CHANGE UP (ref 8–20)
CALCIUM SERPL-MCNC: 9.5 MG/DL — SIGNIFICANT CHANGE UP (ref 8.6–10.2)
CHLORIDE SERPL-SCNC: 100 MMOL/L — SIGNIFICANT CHANGE UP (ref 98–107)
CO2 SERPL-SCNC: 25 MMOL/L — SIGNIFICANT CHANGE UP (ref 22–29)
COLOR SPEC: YELLOW — SIGNIFICANT CHANGE UP
CREAT SERPL-MCNC: 0.82 MG/DL — SIGNIFICANT CHANGE UP (ref 0.5–1.3)
DIFF PNL FLD: ABNORMAL
EOSINOPHIL # BLD AUTO: 0.05 K/UL — SIGNIFICANT CHANGE UP (ref 0–0.5)
EOSINOPHIL NFR BLD AUTO: 0.7 % — SIGNIFICANT CHANGE UP (ref 0–6)
EPI CELLS # UR: SIGNIFICANT CHANGE UP
GLUCOSE SERPL-MCNC: 86 MG/DL — SIGNIFICANT CHANGE UP (ref 70–115)
GLUCOSE UR QL: NEGATIVE MG/DL — SIGNIFICANT CHANGE UP
GRAN CASTS # UR COMP ASSIST: ABNORMAL /LPF
HCT VFR BLD CALC: 43.4 % — SIGNIFICANT CHANGE UP (ref 39–50)
HGB BLD-MCNC: 13.5 G/DL — SIGNIFICANT CHANGE UP (ref 13–17)
HYALINE CASTS # UR AUTO: ABNORMAL /LPF
IMM GRANULOCYTES NFR BLD AUTO: 0.3 % — SIGNIFICANT CHANGE UP (ref 0–1.5)
INR BLD: 5.16 RATIO — CRITICAL HIGH (ref 0.88–1.16)
KETONES UR-MCNC: NEGATIVE — SIGNIFICANT CHANGE UP
LEUKOCYTE ESTERASE UR-ACNC: ABNORMAL
LYMPHOCYTES # BLD AUTO: 2.4 K/UL — SIGNIFICANT CHANGE UP (ref 1–3.3)
LYMPHOCYTES # BLD AUTO: 35.8 % — SIGNIFICANT CHANGE UP (ref 13–44)
MCHC RBC-ENTMCNC: 24.8 PG — LOW (ref 27–34)
MCHC RBC-ENTMCNC: 31.1 GM/DL — LOW (ref 32–36)
MCV RBC AUTO: 79.8 FL — LOW (ref 80–100)
MONOCYTES # BLD AUTO: 0.83 K/UL — SIGNIFICANT CHANGE UP (ref 0–0.9)
MONOCYTES NFR BLD AUTO: 12.4 % — SIGNIFICANT CHANGE UP (ref 2–14)
NEUTROPHILS # BLD AUTO: 3.37 K/UL — SIGNIFICANT CHANGE UP (ref 1.8–7.4)
NEUTROPHILS NFR BLD AUTO: 50.4 % — SIGNIFICANT CHANGE UP (ref 43–77)
NITRITE UR-MCNC: NEGATIVE — SIGNIFICANT CHANGE UP
PH UR: 5 — SIGNIFICANT CHANGE UP (ref 5–8)
PLATELET # BLD AUTO: 233 K/UL — SIGNIFICANT CHANGE UP (ref 150–400)
POTASSIUM SERPL-MCNC: 4.6 MMOL/L — SIGNIFICANT CHANGE UP (ref 3.5–5.3)
POTASSIUM SERPL-SCNC: 4.6 MMOL/L — SIGNIFICANT CHANGE UP (ref 3.5–5.3)
PROT SERPL-MCNC: 7.8 G/DL — SIGNIFICANT CHANGE UP (ref 6.6–8.7)
PROT UR-MCNC: 100 MG/DL
PROTHROM AB SERPL-ACNC: 62.4 SEC — HIGH (ref 10–12.9)
RBC # BLD: 5.44 M/UL — SIGNIFICANT CHANGE UP (ref 4.2–5.8)
RBC # FLD: 13.4 % — SIGNIFICANT CHANGE UP (ref 10.3–14.5)
RBC CASTS # UR COMP ASSIST: ABNORMAL /HPF (ref 0–4)
SODIUM SERPL-SCNC: 136 MMOL/L — SIGNIFICANT CHANGE UP (ref 135–145)
SP GR SPEC: 1.02 — SIGNIFICANT CHANGE UP (ref 1.01–1.02)
UROBILINOGEN FLD QL: NEGATIVE MG/DL — SIGNIFICANT CHANGE UP
WBC # BLD: 6.7 K/UL — SIGNIFICANT CHANGE UP (ref 3.8–10.5)
WBC # FLD AUTO: 6.7 K/UL — SIGNIFICANT CHANGE UP (ref 3.8–10.5)
WBC UR QL: >50

## 2019-08-16 PROCEDURE — 80053 COMPREHEN METABOLIC PANEL: CPT

## 2019-08-16 PROCEDURE — 85730 THROMBOPLASTIN TIME PARTIAL: CPT

## 2019-08-16 PROCEDURE — 99284 EMERGENCY DEPT VISIT MOD MDM: CPT

## 2019-08-16 PROCEDURE — 85610 PROTHROMBIN TIME: CPT

## 2019-08-16 PROCEDURE — 36415 COLL VENOUS BLD VENIPUNCTURE: CPT

## 2019-08-16 PROCEDURE — 81001 URINALYSIS AUTO W/SCOPE: CPT

## 2019-08-16 PROCEDURE — 85027 COMPLETE CBC AUTOMATED: CPT

## 2019-08-16 PROCEDURE — 99283 EMERGENCY DEPT VISIT LOW MDM: CPT

## 2019-08-16 RX ORDER — LISINOPRIL 2.5 MG/1
10 TABLET ORAL DAILY
Refills: 0 | Status: DISCONTINUED | OUTPATIENT
Start: 2019-08-16 | End: 2019-08-29

## 2019-08-16 RX ORDER — CEFUROXIME AXETIL 250 MG
1 TABLET ORAL
Qty: 14 | Refills: 0
Start: 2019-08-16 | End: 2019-08-22

## 2019-08-16 RX ORDER — CEFUROXIME AXETIL 250 MG
250 TABLET ORAL ONCE
Refills: 0 | Status: COMPLETED | OUTPATIENT
Start: 2019-08-16 | End: 2019-08-16

## 2019-08-16 RX ADMIN — Medication 250 MILLIGRAM(S): at 21:45

## 2019-08-16 RX ADMIN — LISINOPRIL 10 MILLIGRAM(S): 2.5 TABLET ORAL at 19:48

## 2019-08-16 NOTE — ED STATDOCS - ATTENDING CONTRIBUTION TO CARE
I, Joann Herrera, performed the initial face to face bedside interview with this patient regarding history of present illness, review of symptoms and relevant past medical, social and family history.  I completed an independent physical examination.  I was the initial provider who evaluated this patient. I have signed out the follow up of any pending tests (i.e. labs, radiological studies) to the ACP.  I have communicated the patient’s plan of care and disposition with the ACP.  The history, relevant review of systems, past medical and surgical history, medical decision making, and physical examination was documented by the scribe in my presence and I attest to the accuracy of the documentation.

## 2019-08-16 NOTE — ED STATDOCS - PROGRESS NOTE DETAILS
Pt seen by intake MD and agree with HPI/ROS/PE/Plan. Pt has INR of 5.16. Also has uti. Spoke with attending and will hold coumadin for 2 nights and send abx to pharmacy for uti with PCP f/u.

## 2019-08-16 NOTE — ED STATDOCS - OBJECTIVE STATEMENT
29 year old male with Hx of HTN, Clots presenting to the ED c/o because INR levels were elevated. and blood pressure was high. denies fever. denies HA or neck pain. no chest pain or sob. no abd pain. no n/v/d. no urinary f/u/d. no back pain. no motor or sensory deficits. denies illicit drug use. no recent travel. no rash. no other acute issues symptoms or concerns 29 year old male with Hx of HTN, Clots presenting to the ED c/o because his INR levels were elevated. and blood pressure was high. Pt states he is on Coumadin. denies fever. denies HA or neck pain. no chest pain or sob. no abd pain. no n/v/d. no urinary f/u/d. no back pain. no motor or sensory deficits. denies illicit drug use. no recent travel. no rash. no other acute issues symptoms or concerns 29 year old male with Hx of HTN, dysarthria, Facial Dissymmetry, Afib, CHF presenting to the ED c/o because his INR levels were elevated. and blood pressure was high. Pt states he is on Coumadin. denies fever. denies HA or neck pain. no chest pain or sob. no abd pain. no n/v/d. no urinary f/u/d. no back pain. no motor or sensory deficits. denies illicit drug use. no recent travel. no rash. no other acute issues symptoms or concerns.

## 2019-08-28 ENCOUNTER — APPOINTMENT (OUTPATIENT)
Dept: ELECTROPHYSIOLOGY | Facility: CLINIC | Age: 29
End: 2019-08-28

## 2019-09-09 ENCOUNTER — APPOINTMENT (OUTPATIENT)
Dept: CARDIOLOGY | Facility: CLINIC | Age: 29
End: 2019-09-09

## 2019-09-09 ENCOUNTER — APPOINTMENT (OUTPATIENT)
Dept: ELECTROPHYSIOLOGY | Facility: CLINIC | Age: 29
End: 2019-09-09

## 2019-09-11 ENCOUNTER — APPOINTMENT (OUTPATIENT)
Dept: CARDIOLOGY | Facility: CLINIC | Age: 29
End: 2019-09-11
Payer: MEDICAID

## 2019-09-25 ENCOUNTER — NON-APPOINTMENT (OUTPATIENT)
Age: 29
End: 2019-09-25

## 2019-09-25 ENCOUNTER — APPOINTMENT (OUTPATIENT)
Dept: CARDIOLOGY | Facility: CLINIC | Age: 29
End: 2019-09-25
Payer: MEDICAID

## 2019-09-25 VITALS
HEIGHT: 62 IN | SYSTOLIC BLOOD PRESSURE: 144 MMHG | OXYGEN SATURATION: 96 % | RESPIRATION RATE: 18 BRPM | HEART RATE: 61 BPM | DIASTOLIC BLOOD PRESSURE: 90 MMHG | BODY MASS INDEX: 29.08 KG/M2 | WEIGHT: 158 LBS

## 2019-09-25 DIAGNOSIS — Z72.3 LACK OF PHYSICAL EXERCISE: ICD-10-CM

## 2019-09-25 DIAGNOSIS — Z56.0 UNEMPLOYMENT, UNSPECIFIED: ICD-10-CM

## 2019-09-25 PROCEDURE — 99214 OFFICE O/P EST MOD 30 MIN: CPT

## 2019-09-25 PROCEDURE — 93000 ELECTROCARDIOGRAM COMPLETE: CPT

## 2019-09-25 SDOH — ECONOMIC STABILITY - INCOME SECURITY: UNEMPLOYMENT, UNSPECIFIED: Z56.0

## 2019-09-25 NOTE — PHYSICAL EXAM
[General Appearance - Well Developed] : well developed [Normal Appearance] : normal appearance [General Appearance - Well Nourished] : well nourished [Normal Conjunctiva] : the conjunctiva exhibited no abnormalities [Normal Jugular Venous A Waves Present] : normal jugular venous A waves present [Normal Jugular Venous V Waves Present] : normal jugular venous V waves present [] : no respiratory distress [Respiration, Rhythm And Depth] : normal respiratory rhythm and effort [Auscultation Breath Sounds / Voice Sounds] : lungs were clear to auscultation bilaterally [Heart Sounds] : normal S1 and S2 [Heart Rate And Rhythm] : heart rate and rhythm were normal [Murmurs] : no murmurs present [Arterial Pulses Normal] : the arterial pulses were normal [Edema] : no peripheral edema present [Bowel Sounds] : normal bowel sounds [Abdomen Soft] : soft [Nail Clubbing] : no clubbing of the fingernails [FreeTextEntry1] : cleft lip repaired

## 2019-09-25 NOTE — REASON FOR VISIT
[Follow-Up - Clinic] : a clinic follow-up of [Hypertension] : hypertension [Cardiomyopathy] : cardiomyopathy [FreeTextEntry1] : ICD

## 2019-09-25 NOTE — HISTORY OF PRESENT ILLNESS
[FreeTextEntry1] : 30 y/o male with PMH of NICM ( EF 20% initially) diagnoed in 2012 s/p single chamber ICD (2013) , hx of CVA s/p closure of ASD in 2014 , echo in 2017 showed improvement in EF to 55% . During hospitalization in 2017 his EF was 35-40%\par presents for follow up. \par no exertional chest pain or  SOB, palpitations, dizziness or syncope \par no ICD shocks \par had urological procedure due to urine retention in [facility] and has follow up appointment with Urology \par

## 2019-10-02 ENCOUNTER — APPOINTMENT (OUTPATIENT)
Dept: CARDIOLOGY | Facility: CLINIC | Age: 29
End: 2019-10-02

## 2019-12-26 ENCOUNTER — APPOINTMENT (OUTPATIENT)
Dept: ELECTROPHYSIOLOGY | Facility: CLINIC | Age: 29
End: 2019-12-26
Payer: MEDICAID

## 2019-12-26 VITALS
BODY MASS INDEX: 28.71 KG/M2 | HEART RATE: 72 BPM | HEIGHT: 62 IN | SYSTOLIC BLOOD PRESSURE: 160 MMHG | WEIGHT: 156 LBS | DIASTOLIC BLOOD PRESSURE: 113 MMHG | OXYGEN SATURATION: 96 %

## 2019-12-26 PROCEDURE — 93282 PRGRMG EVAL IMPLANTABLE DFB: CPT

## 2019-12-26 NOTE — PHYSICAL EXAM
[Clean] : clean [Well-Healed] : well-healed [Left Infraclavicular] : left infraclavicular area [Dry] : dry [Bleeding] : no active bleeding [Palpable Crepitus] : no palpable crepitus [Foul Odor] : no foul smell [Serosanguineous Drainage] : no serosanquineous drainage [Purulent Drainage] : no purulent drainage [Serous Drainage] : no serous drainage [Warm] : not warm [Erythema] : not erythematous [Fluctuant] : not fluctuant [Tender] : not tender [Indurated] : not indurated

## 2019-12-26 NOTE — PROCEDURE
[No] : not [VVI] : VVI [NSR] : normal sinus rhythm [ICD] : Implantable cardioverter-defibrillator [[employer]] : [employer] [Normal] : The battery status is normal. [Threshold Testing Performed] : Threshold testing was performed [Sensing Amplitude ___mv] : sensing amplitude was [unfilled] mv [Lead Imp:  ___ohms] : lead impedance was [unfilled] ohms [___V @] : [unfilled] V [___ ms] : [unfilled] ms [None] : none [de-identified] : Fortify [Counters Reset] : the counters were reset [de-identified] :  <1% \par NO events [de-identified] : [id number] [de-identified] : 3/18/13

## 2019-12-26 NOTE — HISTORY OF PRESENT ILLNESS
[None] : The patient complains of no symptoms [de-identified] : 30 y/o M with pmhx of NICM (LVEF 20%) diagnosed in 2012 s/p single chamber ICD for primary prevention (3/2013), CVA secondary to LV hypokinesis and presence of ASD s/p percutaneous ASD closure (2014, Dr. Pierce). He has remained compliant with medical therapy. Follow up TTE, 5/2017, showed significant improvement in LV function EF 50-55%. Subsequently presented to [facility] with SOB and cough (11/30-12/3/2017). TTE during admission showed LVEF 35-40%. \par He has since been started on Entresto 24-26 BID (12/2017) and remains on Coreg. \par

## 2019-12-26 NOTE — DISCUSSION/SUMMARY
[Patient] : the patient [Routine Follow-up in 3-4 months] : routine follow-up in 3-4 months [AICD Function Normal] : normal AICD function [FreeTextEntry1] : 30 y/o M with pmhx of NICM (LVEF 20%) diagnosed in 2012 s/p single chamber ICD for primary prevention (3/2013), CVA secondary to LV hypokinesis and presence of ASD s/p percutaneous ASD closure (2014, Dr. Pierce). He has remained compliant with medical therapy. Follow up TTE, 5/2017, showed significant improvement in LV function EF 50-55%. Subsequently presented to [facility] with SOB and cough (11/30-12/3/2017). TTE during admission showed LVEF 35-40%.  Past history including CVA and ?AF currently on anticoagulation with Xarelto and tolerating it well with no bleeding complications.\par He has been started on Entresto 24-26 BID (12/2017) and remains on Coreg. \par \par -Device interrogation with normal device function. Adequate sensing, impedance and threshold.\par  <1% in VVI mode NO tachyarrhythmia or shock therapy.\par -BP elevated today - will up titrate Entresto dosing. \par -BMP in 1 week to evaluate renal function.  Last 8/19 BUN 11 Creat 0.82\par -routine device interrogation in 3 months.\par \par Gardenia Propper ANP-C

## 2020-04-13 ENCOUNTER — APPOINTMENT (OUTPATIENT)
Dept: CARDIOLOGY | Facility: CLINIC | Age: 30
End: 2020-04-13

## 2020-06-24 ENCOUNTER — APPOINTMENT (OUTPATIENT)
Dept: ELECTROPHYSIOLOGY | Facility: CLINIC | Age: 30
End: 2020-06-24

## 2021-02-09 ENCOUNTER — APPOINTMENT (OUTPATIENT)
Dept: CARDIOLOGY | Facility: CLINIC | Age: 31
End: 2021-02-09

## 2021-02-17 ENCOUNTER — NON-APPOINTMENT (OUTPATIENT)
Age: 31
End: 2021-02-17

## 2021-02-17 ENCOUNTER — APPOINTMENT (OUTPATIENT)
Dept: CARDIOLOGY | Facility: CLINIC | Age: 31
End: 2021-02-17
Payer: MEDICAID

## 2021-02-17 VITALS
RESPIRATION RATE: 17 BRPM | BODY MASS INDEX: 29.81 KG/M2 | HEART RATE: 73 BPM | DIASTOLIC BLOOD PRESSURE: 105 MMHG | HEIGHT: 62 IN | WEIGHT: 162 LBS | TEMPERATURE: 97.9 F | SYSTOLIC BLOOD PRESSURE: 157 MMHG | OXYGEN SATURATION: 97 %

## 2021-02-17 VITALS — DIASTOLIC BLOOD PRESSURE: 108 MMHG | SYSTOLIC BLOOD PRESSURE: 150 MMHG

## 2021-02-17 PROCEDURE — 99214 OFFICE O/P EST MOD 30 MIN: CPT | Mod: 25

## 2021-02-17 PROCEDURE — 99072 ADDL SUPL MATRL&STAF TM PHE: CPT

## 2021-02-17 PROCEDURE — 93282 PRGRMG EVAL IMPLANTABLE DFB: CPT

## 2021-02-17 PROCEDURE — 93000 ELECTROCARDIOGRAM COMPLETE: CPT | Mod: 59

## 2021-02-17 RX ORDER — CLONIDINE 0.1 MG/24H
0.1 PATCH, EXTENDED RELEASE TRANSDERMAL DAILY
Refills: 0 | Status: DISCONTINUED | COMMUNITY
End: 2021-02-17

## 2021-02-18 ENCOUNTER — NON-APPOINTMENT (OUTPATIENT)
Age: 31
End: 2021-02-18

## 2021-02-18 LAB
ALBUMIN SERPL ELPH-MCNC: 4.7 G/DL
ALP BLD-CCNC: 37 U/L
ALT SERPL-CCNC: 19 U/L
ANION GAP SERPL CALC-SCNC: 12 MMOL/L
AST SERPL-CCNC: 23 U/L
BILIRUB SERPL-MCNC: 0.7 MG/DL
BUN SERPL-MCNC: 14 MG/DL
CALCIUM SERPL-MCNC: 10 MG/DL
CHLORIDE SERPL-SCNC: 100 MMOL/L
CO2 SERPL-SCNC: 26 MMOL/L
CREAT SERPL-MCNC: 0.96 MG/DL
GLUCOSE SERPL-MCNC: 90 MG/DL
POTASSIUM SERPL-SCNC: 4.7 MMOL/L
PROT SERPL-MCNC: 7.8 G/DL
SODIUM SERPL-SCNC: 138 MMOL/L

## 2021-03-05 VITALS
DIASTOLIC BLOOD PRESSURE: 66 MMHG | OXYGEN SATURATION: 96 % | TEMPERATURE: 98.5 F | HEART RATE: 82 BPM | SYSTOLIC BLOOD PRESSURE: 104 MMHG

## 2021-03-12 ENCOUNTER — APPOINTMENT (OUTPATIENT)
Dept: CARDIOLOGY | Facility: CLINIC | Age: 31
End: 2021-03-12
Payer: MEDICAID

## 2021-03-12 PROCEDURE — 99072 ADDL SUPL MATRL&STAF TM PHE: CPT

## 2021-03-12 PROCEDURE — 93306 TTE W/DOPPLER COMPLETE: CPT

## 2021-05-20 ENCOUNTER — APPOINTMENT (OUTPATIENT)
Dept: CARDIOLOGY | Facility: CLINIC | Age: 31
End: 2021-05-20

## 2021-05-20 ENCOUNTER — APPOINTMENT (OUTPATIENT)
Dept: ELECTROPHYSIOLOGY | Facility: CLINIC | Age: 31
End: 2021-05-20

## 2021-06-21 ENCOUNTER — APPOINTMENT (OUTPATIENT)
Dept: ELECTROPHYSIOLOGY | Facility: CLINIC | Age: 31
End: 2021-06-21
Payer: MEDICAID

## 2021-06-21 ENCOUNTER — APPOINTMENT (OUTPATIENT)
Dept: CARDIOLOGY | Facility: CLINIC | Age: 31
End: 2021-06-21
Payer: MEDICAID

## 2021-06-21 VITALS — DIASTOLIC BLOOD PRESSURE: 90 MMHG | SYSTOLIC BLOOD PRESSURE: 130 MMHG

## 2021-06-21 VITALS
WEIGHT: 161 LBS | BODY MASS INDEX: 29.63 KG/M2 | HEART RATE: 75 BPM | SYSTOLIC BLOOD PRESSURE: 138 MMHG | TEMPERATURE: 97.1 F | OXYGEN SATURATION: 97 % | DIASTOLIC BLOOD PRESSURE: 94 MMHG | HEIGHT: 62 IN

## 2021-06-21 PROCEDURE — 93000 ELECTROCARDIOGRAM COMPLETE: CPT | Mod: 59

## 2021-06-21 PROCEDURE — 93282 PRGRMG EVAL IMPLANTABLE DFB: CPT

## 2021-06-21 PROCEDURE — 99214 OFFICE O/P EST MOD 30 MIN: CPT

## 2021-06-21 RX ORDER — RIVAROXABAN 20 MG/1
20 TABLET, FILM COATED ORAL
Qty: 30 | Refills: 3 | Status: DISCONTINUED | COMMUNITY
End: 2021-06-21

## 2021-06-21 RX ORDER — ASPIRIN 81 MG/1
81 TABLET, CHEWABLE ORAL
Qty: 30 | Refills: 0 | Status: ACTIVE | COMMUNITY
Start: 2018-11-28

## 2021-08-10 ENCOUNTER — NON-APPOINTMENT (OUTPATIENT)
Age: 31
End: 2021-08-10

## 2021-08-12 ENCOUNTER — EMERGENCY (EMERGENCY)
Facility: HOSPITAL | Age: 31
LOS: 1 days | Discharge: HOME | End: 2021-08-12
Attending: EMERGENCY MEDICINE
Payer: SELF-PAY

## 2021-08-12 VITALS
WEIGHT: 149.91 LBS | DIASTOLIC BLOOD PRESSURE: 113 MMHG | HEIGHT: 63 IN | OXYGEN SATURATION: 97 % | TEMPERATURE: 98 F | RESPIRATION RATE: 20 BRPM | HEART RATE: 88 BPM | SYSTOLIC BLOOD PRESSURE: 161 MMHG

## 2021-08-12 DIAGNOSIS — Z98.890 OTHER SPECIFIED POSTPROCEDURAL STATES: Chronic | ICD-10-CM

## 2021-08-12 PROCEDURE — 70486 CT MAXILLOFACIAL W/O DYE: CPT | Mod: 26,MA

## 2021-08-12 PROCEDURE — 73090 X-RAY EXAM OF FOREARM: CPT | Mod: 26,LT

## 2021-08-12 PROCEDURE — 70450 CT HEAD/BRAIN W/O DYE: CPT | Mod: MA

## 2021-08-12 PROCEDURE — 70450 CT HEAD/BRAIN W/O DYE: CPT | Mod: 26,MA

## 2021-08-12 PROCEDURE — 72125 CT NECK SPINE W/O DYE: CPT | Mod: 26,MA

## 2021-08-12 PROCEDURE — 99053 MED SERV 10PM-8AM 24 HR FAC: CPT

## 2021-08-12 PROCEDURE — 73090 X-RAY EXAM OF FOREARM: CPT

## 2021-08-12 PROCEDURE — 99284 EMERGENCY DEPT VISIT MOD MDM: CPT | Mod: 25

## 2021-08-12 PROCEDURE — 99285 EMERGENCY DEPT VISIT HI MDM: CPT

## 2021-08-12 PROCEDURE — 72125 CT NECK SPINE W/O DYE: CPT | Mod: MA

## 2021-08-12 PROCEDURE — 70486 CT MAXILLOFACIAL W/O DYE: CPT | Mod: MA
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
## 2021-08-12 NOTE — ED PROVIDER NOTE - PATIENT PORTAL LINK FT
You can access the FollowMyHealth Patient Portal offered by [facility] by registering at the following website: [email or web address]. By joining [email or web address]’s FollowMyHealth portal, you will also be able to view your health information using other applications (apps) compatible with our system.

## 2021-08-12 NOTE — ED PROVIDER NOTE - NSFOLLOWUPINSTRUCTIONS_ED_ALL_ED_FT
- Please follow-up with your primary care doctor.  Please call for an appointment in the next 5-7 days but if you cannot follow-up with your primary care doctor please return to the ED for any urgent issues.  - You were given a copy of the tests performed today.  Please bring the results with you and review them with your primary care doctor.  - If you have any worsening of symptoms or any other concerns please return to the ED immediately.  - Please continue taking your home medications as directed.
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
## 2021-08-12 NOTE — ED PROVIDER NOTE - PHYSICAL EXAMINATION
General: Well appearing Male in no acute distress  HEENT: Facial dyssymmetry, chronic, lesion present on left forehead, tender to palpation. Moist mucous membranes. Oropharynx clear. No lymphadenopathy.  Eyes: No scleral icterus. No conjunctival pallor. Asymmetrical gaze, chronic. EOM limited in left eye in terms of vertical movement due to pain. JACINTO.  Neck: Soft and supple. No midline tenderness  Cardiac: Regular rate and regular rhythm. No murmurs. No LE edema.  Resp: Lungs CTAB. No wheezes, rales or rhonchi.  Abd: Soft, non-tender, non-distended. No guarding or rebound. No scars, masses, or lesions.  Back: Spine midline and non-tender. No CVA tenderness.    Skin: No rashes, abrasions, or lacerations.  Neuro: AO x 3. Moves all extremities symmetrically. Motor strength and sensation grossly intact.
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
## 2021-08-12 NOTE — ED PROVIDER NOTE - NSICDXPASTMEDICALHX_GEN_ALL_CORE_FT
PAST MEDICAL HISTORY:  Atrial fibrillation, unspecified type     CHF (congestive heart failure)     Cleft lip     CVA (cerebral vascular accident)     HTN (hypertension)     TIA (transient ischemic attack)
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
## 2021-12-01 ENCOUNTER — APPOINTMENT (OUTPATIENT)
Dept: CARDIOLOGY | Facility: CLINIC | Age: 31
End: 2021-12-01
Payer: MEDICAID

## 2021-12-01 ENCOUNTER — NON-APPOINTMENT (OUTPATIENT)
Age: 31
End: 2021-12-01

## 2021-12-01 VITALS
BODY MASS INDEX: 30.36 KG/M2 | HEIGHT: 62 IN | HEART RATE: 74 BPM | SYSTOLIC BLOOD PRESSURE: 140 MMHG | DIASTOLIC BLOOD PRESSURE: 100 MMHG | WEIGHT: 165 LBS | RESPIRATION RATE: 16 BRPM | OXYGEN SATURATION: 96 % | TEMPERATURE: 97.1 F

## 2021-12-01 PROCEDURE — 99214 OFFICE O/P EST MOD 30 MIN: CPT

## 2021-12-01 PROCEDURE — 93000 ELECTROCARDIOGRAM COMPLETE: CPT

## 2022-01-19 NOTE — ASSESSMENT
[FreeTextEntry1] : Patient with history of dilated cardiomyopathy however echocardiogram in March 2021 shows LVEF to be 45 to 50%.\par He has a history of A. fib and CVA in the past.  Severe was attributed to possible left ventricular thrombus.\par Patient is on goal-directed medical therapy for heart failure.\par He is euvolemic on examination.\par He needs to have ora lsurgery.  He can discontinue Eliquis for 48 hours prior to procedure and resume it the day after the procedure.\par Continue with exercise\par .  He still has abdominal obesity, I advised him to decrease carbohydrate intake.\par He can follow-up with me in about 6 months.\par As long as asymptomatic he can follow-up with me in about 6 months.\par

## 2022-01-19 NOTE — REVIEW OF SYSTEMS
[Weight Loss (___ Lbs)] : [unfilled] ~Ulb weight loss [Joint Pain] : joint pain [Negative] : Respiratory [Feeling Fatigued] : not feeling fatigued [Blurry Vision] : no blurred vision [Discharge From Ears] : no discharge from the ears [Dyspnea on exertion] : not dyspnea during exertion [Leg Claudication] : no intermittent leg claudication [Palpitations] : no palpitations [Abdominal Pain] : no abdominal pain [Change in Appetite] : no change in appetite [Urinary Frequency] : no change in urinary frequency [Myalgia] : no myalgia [Under Stress] : not under stress [Easy Bleeding] : no tendency for easy bleeding

## 2022-01-19 NOTE — PHYSICAL EXAM
[Well Developed] : well developed [Well Nourished] : well nourished [Normal Conjunctiva] : normal conjunctiva [Normal S1, S2] : normal S1, S2 [No Rub] : no rub [No Rash] : no rash [No Skin Lesions] : no skin lesions [Moves all extremities] : moves all extremities [de-identified] : No edema

## 2022-01-19 NOTE — ADDENDUM
[FreeTextEntry1] : 1/19/2022\par \par Mr. Sarmad Abarca is stable from cardiology point of view for upcoming cystoscopy with urethrotomy. He may hold Eliquis for 48 hours prior to the procedure and resume as soon as possible.\par \par Cuate Memoracion, NP

## 2022-01-19 NOTE — HISTORY OF PRESENT ILLNESS
[FreeTextEntry1] : Sarmad is here for follow-up with history of nonischemic cardiomyopathy, prior CVA, ASD closure in 2014.  At his echo in 2017 demonstrated improvement of LVEF to 55%, echocardiogram performed last spring shows that ejection fraction has decreased to about 45%.  Patient is exercising and denies having any chest pain or shortness of breath.  He denies any palpitations, syncope or presyncope.  He is on Eliquis and requires clearance to undergo mole removal.  He does not have any surgical date\par \par His EKG shows a sinus rhythm, there is evidence of possible old inferior wall MI and T wave inversion in the anterolateral leads.\par \par He denies any PND orthopnea.  Denies any lightheadedness.  He is exercising by running as well as lifting weights and doing pull-ups.\par

## 2022-01-21 NOTE — ED ADULT NURSE NOTE - CINV DISCH MEDS REVIEWED YN
Yes Urinary Tract Infection    A urinary tract infection (UTI) is an infection of any part of the urinary tract, which includes the kidneys, ureters, bladder, and urethra. Risk factors include ignoring your need to urinate, wiping back to front if female, being an uncircumcised male, and having diabetes or a weak immune system. Symptoms include frequent urination, pain or burning with urination, foul smelling urine, cloudy urine, pain in the lower abdomen, blood in the urine, and fever. If you were prescribed an antibiotic medicine, take it as told by your health care provider. Do not stop taking the antibiotic even if you start to feel better.    SEEK IMMEDIATE MEDICAL CARE IF YOU HAVE ANY OF THE FOLLOWING SYMPTOMS: severe back or abdominal pain, fever, inability to keep fluids or medicine down, dizziness/lightheadedness, or a change in mental status.    Cough    Coughing is a reflex that clears your throat and your airways. Coughing helps to heal and protect your lungs. It is normal to cough occasionally, but a cough that happens with other symptoms or lasts a long time may be a sign of a condition that needs treatment. Coughing may be caused by infections, asthma or COPD, smoking, postnasal drip, gastroesophageal reflux, as well as other medical conditions. Take medicines only as instructed by your health care provider. Avoid environments or triggers that causes you to cough at work or at home.    SEEK IMMEDIATE MEDICAL CARE IF YOU HAVE ANY OF THE FOLLOWING SYMPTOMS: coughing up blood, shortness of breath, rapid heart rate, chest pain, unexplained weight loss or night sweats.

## 2022-02-14 ENCOUNTER — APPOINTMENT (OUTPATIENT)
Dept: ELECTROPHYSIOLOGY | Facility: CLINIC | Age: 32
End: 2022-02-14

## 2022-02-14 ENCOUNTER — APPOINTMENT (OUTPATIENT)
Dept: CARDIOLOGY | Facility: CLINIC | Age: 32
End: 2022-02-14

## 2022-03-16 ENCOUNTER — RX RENEWAL (OUTPATIENT)
Age: 32
End: 2022-03-16

## 2022-04-14 NOTE — ED ADULT TRIAGE NOTE - SPO2 (%)
Patient is a 88yo Female with unknown PMH, presents with weakness and fall per ED chart. Admitted for fall found to have UTI and GBS bacteremia. Nephrology consulted for Elevated serum creatinine.    1. CHAZ- unknown baseline SCr; likely hemodynamically mediated in the setting of sepsis/ infection and less likely due to obstruction. Active UA in the setting of UTI. FeNa 3.59%; intrinsic likely ATN. +bladder scan and now with acosta; with no improvement in renal function after acosta placement which is more consistent with ATN. Hypernatremia- resolved; monitor off IVF. Pt with +severe AS on TTE with elevated BNP; however pt euvolemic. CT abd/pelvis with no hydro but distended bladder with atrophic left kidney.  Strict I/Os. Avoid nephrotoxins/ NSAIDs/ RCA. Monitor BMP.  2. CKD unspecified- previous SCr 1.6-1.9 in 2018; however no recent blood work for review. CT abd/pelvis with left atrophic kidney. Defer secondary w/u due to advanced age. Avoid nephrotoxins  As per pt's son (at bedside); pt was born with atrophic left kidney and use to see Nephrologist Dr. Mercer for years but no longer accepts her insurance and has not seen in 2 yrs. Pt subsequently saw a new PMD in November since returning from [facility]; he will try to get us the PMD's office number. PTH elevated; with elevated corrected calcium; check ionized Ca- will consider starting Calcitriol once Ca declines. SIFE neg with normal SPEOP  3. Sepsis- due to Strep agalactia bactremia and Ecoli UTI with ?aspiration PNA. Pt on Ceftriaxone s/p Flagyl. ID following  4. HTN 2/2 CKD- BP acceptable on Amlodipine. If elevated BP - Recc to d/c Amlodipine and switch to Nifedipine ER 60mg PO daily, titrate as needed. c/w low salt diet. Monitor BP  5. Acute encephalopathy- CT head neg. Plan as per primary team.       [facility] NEPHROLOG[facility]  Gaurav Helms M.D.  Dylan Pimentel D.O.  Sindy Baugh M.D.  Citlaly Bridges, BETH, ANP-C  [phone]    [address] [place] [address]   92

## 2022-06-17 ENCOUNTER — INPATIENT (INPATIENT)
Facility: HOSPITAL | Age: 32
LOS: 4 days | Discharge: HOME | DRG: 65 | End: 2022-06-22
Attending: HOSPITALIST | Admitting: HOSPITALIST
Payer: MEDICAID

## 2022-06-17 VITALS — HEIGHT: 63 IN

## 2022-06-17 DIAGNOSIS — I63.9 CEREBRAL INFARCTION, UNSPECIFIED: ICD-10-CM

## 2022-06-17 DIAGNOSIS — Z98.890 OTHER SPECIFIED POSTPROCEDURAL STATES: Chronic | ICD-10-CM

## 2022-06-17 LAB
ALBUMIN SERPL ELPH-MCNC: 4.5 G/DL — SIGNIFICANT CHANGE UP (ref 3.3–5.2)
ALP SERPL-CCNC: 42 U/L — SIGNIFICANT CHANGE UP (ref 40–120)
ALT FLD-CCNC: 16 U/L — SIGNIFICANT CHANGE UP
ANION GAP SERPL CALC-SCNC: 14 MMOL/L — SIGNIFICANT CHANGE UP (ref 5–17)
APTT BLD: 34.5 SEC — SIGNIFICANT CHANGE UP (ref 27.5–35.5)
AST SERPL-CCNC: 19 U/L — SIGNIFICANT CHANGE UP
BASOPHILS # BLD AUTO: 0.04 K/UL — SIGNIFICANT CHANGE UP (ref 0–0.2)
BASOPHILS NFR BLD AUTO: 0.7 % — SIGNIFICANT CHANGE UP (ref 0–2)
BILIRUB SERPL-MCNC: 0.7 MG/DL — SIGNIFICANT CHANGE UP (ref 0.4–2)
BUN SERPL-MCNC: 11.7 MG/DL — SIGNIFICANT CHANGE UP (ref 8–20)
CALCIUM SERPL-MCNC: 9.3 MG/DL — SIGNIFICANT CHANGE UP (ref 8.6–10.2)
CHLORIDE SERPL-SCNC: 101 MMOL/L — SIGNIFICANT CHANGE UP (ref 98–107)
CO2 SERPL-SCNC: 25 MMOL/L — SIGNIFICANT CHANGE UP (ref 22–29)
CREAT SERPL-MCNC: 1.12 MG/DL — SIGNIFICANT CHANGE UP (ref 0.5–1.3)
EGFR: 89 ML/MIN/1.73M2 — SIGNIFICANT CHANGE UP
EOSINOPHIL # BLD AUTO: 0.08 K/UL — SIGNIFICANT CHANGE UP (ref 0–0.5)
EOSINOPHIL NFR BLD AUTO: 1.4 % — SIGNIFICANT CHANGE UP (ref 0–6)
ETHANOL SERPL-MCNC: <10 MG/DL — SIGNIFICANT CHANGE UP (ref 0–9)
FLUAV AG NPH QL: SIGNIFICANT CHANGE UP
FLUBV AG NPH QL: SIGNIFICANT CHANGE UP
GLUCOSE SERPL-MCNC: 127 MG/DL — HIGH (ref 70–99)
HCT VFR BLD CALC: 48.1 % — SIGNIFICANT CHANGE UP (ref 39–50)
HGB BLD-MCNC: 15.8 G/DL — SIGNIFICANT CHANGE UP (ref 13–17)
IMM GRANULOCYTES NFR BLD AUTO: 0.2 % — SIGNIFICANT CHANGE UP (ref 0–1.5)
INR BLD: 1.03 RATIO — SIGNIFICANT CHANGE UP (ref 0.88–1.16)
LYMPHOCYTES # BLD AUTO: 1.58 K/UL — SIGNIFICANT CHANGE UP (ref 1–3.3)
LYMPHOCYTES # BLD AUTO: 27.8 % — SIGNIFICANT CHANGE UP (ref 13–44)
MCHC RBC-ENTMCNC: 27.9 PG — SIGNIFICANT CHANGE UP (ref 27–34)
MCHC RBC-ENTMCNC: 32.8 GM/DL — SIGNIFICANT CHANGE UP (ref 32–36)
MCV RBC AUTO: 84.8 FL — SIGNIFICANT CHANGE UP (ref 80–100)
MONOCYTES # BLD AUTO: 0.53 K/UL — SIGNIFICANT CHANGE UP (ref 0–0.9)
MONOCYTES NFR BLD AUTO: 9.3 % — SIGNIFICANT CHANGE UP (ref 2–14)
NEUTROPHILS # BLD AUTO: 3.45 K/UL — SIGNIFICANT CHANGE UP (ref 1.8–7.4)
NEUTROPHILS NFR BLD AUTO: 60.6 % — SIGNIFICANT CHANGE UP (ref 43–77)
PLATELET # BLD AUTO: 204 K/UL — SIGNIFICANT CHANGE UP (ref 150–400)
POTASSIUM SERPL-MCNC: 3.6 MMOL/L — SIGNIFICANT CHANGE UP (ref 3.5–5.3)
POTASSIUM SERPL-SCNC: 3.6 MMOL/L — SIGNIFICANT CHANGE UP (ref 3.5–5.3)
PROT SERPL-MCNC: 7.7 G/DL — SIGNIFICANT CHANGE UP (ref 6.6–8.7)
PROTHROM AB SERPL-ACNC: 12 SEC — SIGNIFICANT CHANGE UP (ref 10.5–13.4)
RBC # BLD: 5.67 M/UL — SIGNIFICANT CHANGE UP (ref 4.2–5.8)
RBC # FLD: 13.4 % — SIGNIFICANT CHANGE UP (ref 10.3–14.5)
RSV RNA NPH QL NAA+NON-PROBE: SIGNIFICANT CHANGE UP
SARS-COV-2 RNA SPEC QL NAA+PROBE: SIGNIFICANT CHANGE UP
SODIUM SERPL-SCNC: 140 MMOL/L — SIGNIFICANT CHANGE UP (ref 135–145)
TROPONIN T SERPL-MCNC: <0.01 NG/ML — SIGNIFICANT CHANGE UP (ref 0–0.06)
WBC # BLD: 5.69 K/UL — SIGNIFICANT CHANGE UP (ref 3.8–10.5)
WBC # FLD AUTO: 5.69 K/UL — SIGNIFICANT CHANGE UP (ref 3.8–10.5)

## 2022-06-17 PROCEDURE — 70496 CT ANGIOGRAPHY HEAD: CPT | Mod: 26,MA

## 2022-06-17 PROCEDURE — 70498 CT ANGIOGRAPHY NECK: CPT | Mod: 26,MA

## 2022-06-17 PROCEDURE — 99223 1ST HOSP IP/OBS HIGH 75: CPT

## 2022-06-17 PROCEDURE — 0042T: CPT

## 2022-06-17 PROCEDURE — 99285 EMERGENCY DEPT VISIT HI MDM: CPT

## 2022-06-17 RX ORDER — ASPIRIN/CALCIUM CARB/MAGNESIUM 324 MG
1 TABLET ORAL
Qty: 0 | Refills: 0 | DISCHARGE

## 2022-06-17 RX ORDER — ENOXAPARIN SODIUM 100 MG/ML
40 INJECTION SUBCUTANEOUS EVERY 24 HOURS
Refills: 0 | Status: DISCONTINUED | OUTPATIENT
Start: 2022-06-17 | End: 2022-06-18

## 2022-06-17 RX ORDER — DOCUSATE SODIUM 100 MG
0 CAPSULE ORAL
Qty: 0 | Refills: 0 | DISCHARGE

## 2022-06-17 RX ORDER — OXYCODONE AND ACETAMINOPHEN 5; 325 MG/1; MG/1
0 TABLET ORAL
Qty: 0 | Refills: 0 | DISCHARGE

## 2022-06-17 RX ORDER — ASPIRIN/CALCIUM CARB/MAGNESIUM 324 MG
300 TABLET ORAL DAILY
Refills: 0 | Status: DISCONTINUED | OUTPATIENT
Start: 2022-06-17 | End: 2022-06-18

## 2022-06-17 NOTE — H&P ADULT - ASSESSMENT
31 yo male with PMH of afib on Eliquis, NICM, s/p ICD, ASD s/p repair, prior CVA was brought to the ED for slurry speech, and bilateral hand numbness and facial droop. LKW time 1630 today. Patient was awake and aware when it happened. denies any extremity motor weakness. After coming to the ED hand numbness resolved but persistent rt facial droop and facial numbness. Denies any headache, cp, sob, n/v, f/c, ab pain, cough congestion.    Impression:    # rule out CVA:  new onset slurry speech, rt facial droop  b/l hand weakness resolved  CT perfusion and ct angio head and neck with out acute pathology  admit to stroke unit  q 2 hr neurocheck  neurology consulted  MR brain patient has ICD st luke [id number],   Serial No. [id number]  pt/ot rehab  speech   bedside swallow eval  keep npo for now  hold po meds for now  iv maintenance fluid  rectal aspirin  dvt ppx  echo  repeat labs in am    # NICM:  euvolemic  hold po meds until passes dysphagia screen  order echo    # pAFIB:  on eliquis  resume eliquis after passes dysphagia screen bedside    # ambulate with assistance                   33 yo male with PMH of afib on Eliquis, NICM, s/p ICD, ASD s/p repair, prior CVA was brought to the ED for slurry speech, and bilateral hand numbness and facial droop. LKW time 1630 today. Patient was awake and aware when it happened. denies any extremity motor weakness. After coming to the ED hand numbness resolved but persistent rt facial droop and facial numbness. Denies any headache, cp, sob, n/v, f/c, ab pain, cough congestion.    Impression:    # rule out CVA:  new onset slurry speech, ? rt facial droop ---NIH 1  b/l hand weakness resolved  CT perfusion and ct angio head and neck with out acute pathology  admit to stroke unit  q 2 hr neurocheck  neurology consulted  MR brain patient has ICD st luke [id number],   Serial No. [id number]  pt/ot rehab  speech   bedside swallow eval  keep npo for now  hold po meds for now  iv maintenance fluid  rectal aspirin  dvt ppx  echo  repeat labs in am    # NICM:  euvolemic  hold po meds until passes dysphagia screen  order echo    # pAFIB:  on eliquis  resume eliquis after passes dysphagia screen bedside    # ambulate with assistance

## 2022-06-17 NOTE — ED PROVIDER NOTE - PROGRESS NOTE DETAILS
Michelle: Patient was determined to be a stroke scale 1 for slurred speech. Thrombotics were not administered after consultation with the telestroke doctor due to pt taking eliquis.

## 2022-06-17 NOTE — H&P ADULT - NSICDXFAMILYHX_GEN_ALL_CORE_FT
FAMILY HISTORY:  Aunt  Still living? Unknown  FH: pancreatic cancer, Age at diagnosis: Age Unknown

## 2022-06-17 NOTE — H&P ADULT - HISTORY OF PRESENT ILLNESS
patient is unable to provide history due to slurry speech, history obtained from mother MARYA  31 yo male with PMH of afib on Eliquis, NICM, s/p ICD, ASD s/p repair, prior CVA was brought to the ED for slurry speech, and bilateral hand numbness and facial droop. LKW time 1630 today. Patient was awake and aware when it happened. denies any extremity motor weakness. After coming to the ED hand numbness resolved but persistent rt facial droop and facial numbness. Denies any headache, cp, sob, n/v, f/c, ab pain, cough congestion.    In the ED, CT head, angio H & N negative  since patient has persistent symptoms he was admitted for further stroke work up    ROS: negative except noted in HPI

## 2022-06-17 NOTE — ED PROVIDER NOTE - NS ED ROS FT
Constitutional: no fever, no chills  Head: NC, AT   Eyes: no redness   ENMT: no nasal congestion/drainage, no sore throat   CV: no chest pain, no edema  Resp: no cough, no dyspnea  GI: no abdominal pain, no nausea, no vomiting, no diarrhea  : no dysuria, no hematuria   Skin: no lesions, no rashes   Neuro: no LOC, no headache, +sensory deficits, no weakness, slurred speech

## 2022-06-17 NOTE — H&P ADULT - NSHPPHYSICALEXAM_GEN_ALL_CORE
Vital Signs Last 24 Hrs  T(C): 36.5 (17 Jun 2022 20:27), Max: 36.5 (17 Jun 2022 20:27)  T(F): 97.7 (17 Jun 2022 20:27), Max: 97.7 (17 Jun 2022 20:27)  HR: 65 (17 Jun 2022 20:27) (65 - 80)  BP: 136/96 (17 Jun 2022 20:27) (136/96 - 169/103)  BP(mean): 131 (17 Jun 2022 18:42) (131 - 131)  RR: 16 (17 Jun 2022 20:27) (16 - 18)  SpO2: 99% (17 Jun 2022 20:27) (95% - 99%)    PHYSICAL EXAM:  GENERAL: NAD, well-developed  HEAD:  Atraumatic, Normocephalic  EYES: EOMI, PERRLA, conjunctiva and sclera clear  NECK: Supple, No JVD  CHEST/LUNG: Clear to auscultation bilaterally; No wheeze  HEART: Regular rate and rhythm; No murmurs, rubs, or gallops  ABDOMEN: Soft, Nontender, Nondistended; Bowel sounds present  EXTREMITIES:  2+ Peripheral Pulses, No clubbing, cyanosis, or edema  PSYCH: AAOx3  NEUROLOGY: minimal rt facial droop, subjective numbness b/l midface region, frowning normal, eyelid closure normal bilateral, gross touch sensation intact, persistent slurry speech (new as per family)  SKIN: No rashes or lesions

## 2022-06-17 NOTE — ED ADULT TRIAGE NOTE - CHIEF COMPLAINT QUOTE
b/l arm numbness and slurred speech. pt bp 220/113. lkn 1430 today (6/17). code stroke called. dr. ray at bedside.

## 2022-06-17 NOTE — ED PROVIDER NOTE - OBJECTIVE STATEMENT
32y m with pmh of a fib on eliquis, cva, cleft lip, chf, htn presenting for acute onset of slurred speech and bl hand numbness at 1630 today. Patient was awake and aware when it happened. Denies any headache, cp, sob, n/v, f/c, ab pain, cough congestion.

## 2022-06-17 NOTE — ED PROVIDER NOTE - ATTENDING CONTRIBUTION TO CARE
patient with sudden onset slurred speech and hand numbness  hand numbness improving but speech is still slurred  reporting history of stroke  vss  lcta  rrr  abdomen soft nt nd  ext no edema  head cleft lip, oropharynx clear  neck supple  neuro a & o x 3 strength sensation 5/5 all 4 ext  plan stroke note activated, not candidate for tpa on eliquis cardiac monitoring labs ekg

## 2022-06-17 NOTE — ED ADULT NURSE NOTE - OBJECTIVE STATEMENT
pt c/o facial numbness and his mouth feels numb that started at 1630 c/o a tingling feeling to face speech- slurred pt has some facial deformities noted pt also c/o tingling to his arms and hands pt going straight to cat scan pt is able to move all extremities

## 2022-06-17 NOTE — ED PROVIDER NOTE - PHYSICAL EXAMINATION
General: well appearing, NAD  Head: NC, AT  EENT: EOMI, no scleral icterus  Cardiac: RRR, no apparent murmurs, no lower extremity edema  Respiratory: CTABL, no respiratory distress   Abdomen: soft, ND, NT, nonperitonitic  MSK/Vascular: full ROM, soft compartments, warm extremities  Neuro: AAOx3, decreased sensation to bl hands, light touch intact, 5/5x4, no drift x4, finger to nose wnl, slurred speech per family with mild facial droop per family,  Psych: calm, cooperative

## 2022-06-18 DIAGNOSIS — I48.0 PAROXYSMAL ATRIAL FIBRILLATION: ICD-10-CM

## 2022-06-18 DIAGNOSIS — I63.9 CEREBRAL INFARCTION, UNSPECIFIED: ICD-10-CM

## 2022-06-18 PROCEDURE — 99233 SBSQ HOSP IP/OBS HIGH 50: CPT

## 2022-06-18 PROCEDURE — 93010 ELECTROCARDIOGRAM REPORT: CPT

## 2022-06-18 PROCEDURE — 99223 1ST HOSP IP/OBS HIGH 75: CPT

## 2022-06-18 RX ORDER — HYDRALAZINE HCL 50 MG
10 TABLET ORAL ONCE
Refills: 0 | Status: COMPLETED | OUTPATIENT
Start: 2022-06-18 | End: 2022-06-18

## 2022-06-18 RX ORDER — ASPIRIN/CALCIUM CARB/MAGNESIUM 324 MG
81 TABLET ORAL DAILY
Refills: 0 | Status: DISCONTINUED | OUTPATIENT
Start: 2022-06-18 | End: 2022-06-22

## 2022-06-18 RX ORDER — ACETAMINOPHEN 500 MG
650 TABLET ORAL EVERY 6 HOURS
Refills: 0 | Status: DISCONTINUED | OUTPATIENT
Start: 2022-06-18 | End: 2022-06-22

## 2022-06-18 RX ORDER — APIXABAN 2.5 MG/1
5 TABLET, FILM COATED ORAL
Refills: 0 | Status: DISCONTINUED | OUTPATIENT
Start: 2022-06-18 | End: 2022-06-22

## 2022-06-18 RX ORDER — TRAMADOL HYDROCHLORIDE 50 MG/1
25 TABLET ORAL EVERY 6 HOURS
Refills: 0 | Status: DISCONTINUED | OUTPATIENT
Start: 2022-06-18 | End: 2022-06-22

## 2022-06-18 RX ORDER — LANOLIN ALCOHOL/MO/W.PET/CERES
3 CREAM (GRAM) TOPICAL ONCE
Refills: 0 | Status: DISCONTINUED | OUTPATIENT
Start: 2022-06-18 | End: 2022-06-22

## 2022-06-18 RX ORDER — HYDRALAZINE HCL 50 MG
10 TABLET ORAL EVERY 6 HOURS
Refills: 0 | Status: DISCONTINUED | OUTPATIENT
Start: 2022-06-18 | End: 2022-06-19

## 2022-06-18 RX ADMIN — APIXABAN 5 MILLIGRAM(S): 2.5 TABLET, FILM COATED ORAL at 21:15

## 2022-06-18 RX ADMIN — ENOXAPARIN SODIUM 40 MILLIGRAM(S): 100 INJECTION SUBCUTANEOUS at 05:32

## 2022-06-18 RX ADMIN — Medication 81 MILLIGRAM(S): at 11:19

## 2022-06-18 RX ADMIN — APIXABAN 5 MILLIGRAM(S): 2.5 TABLET, FILM COATED ORAL at 13:11

## 2022-06-18 RX ADMIN — Medication 10 MILLIGRAM(S): at 20:13

## 2022-06-18 RX ADMIN — Medication 10 MILLIGRAM(S): at 16:55

## 2022-06-18 NOTE — CONSULT NOTE ADULT - PROBLEM SELECTOR RECOMMENDATION 9
Called to evaluate etiology of possible new CVA, patient presents with new CVA symptoms  HX. of CVA and ASD closure.  Also hx of AFib  Continue telemetry  TTE   If Echo wnl consider, PAF with failure therapy with Eliquis and may require a new agent.    AICD interrogation to evaluate PAF episodes. Called to evaluate etiology of possible new CVA, patient presents with new CVA symptoms  HX. of CVA and ASD closure.  Also hx of AFib  Continue telemetry  TTE   If Echo wnl consider, PAF with failure therapy with Eliquis and may require a new agent.    AICD interrogation to evaluate PAF episodes.  Neuro following  He describes good compliance with the Eliquis and was therefore not a t-PA candidate. Called to evaluate etiology of possible new CVA, patient presents with new CVA symptoms  HX. of CVA and ASD closure.  Also hx of AFib  Continue telemetry  TTE   If Echo wnl consider, PAF with failure therapy with Eliquis and may require a new agent.    AICD interrogation to evaluate PAF episodes.  Neuro following  He describes good compliance with the Eliquis and was therefore not a t-PA candidate Stroke.   lipid panel in am, fasting  Continue antiplatelet and statin.   MRI if cardiac device is compatible - Otherwise repeat CT head.

## 2022-06-18 NOTE — PHYSICAL THERAPY INITIAL EVALUATION ADULT - PERTINENT HX OF CURRENT PROBLEM, REHAB EVAL
31 yo male with PMH of afib on Eliquis, NICM, s/p ICD, ASD s/p repair, prior CVA was brought to the ED for slurry speech, and bilateral hand numbness and facial droop

## 2022-06-18 NOTE — OCCUPATIONAL THERAPY INITIAL EVALUATION ADULT - NS ASR FOLLOW COMMAND OT EVAL
difficult to understand due to dysarthria, but able to communicate needs and answer questions./100% of the time

## 2022-06-18 NOTE — OCCUPATIONAL THERAPY INITIAL EVALUATION ADULT - ADDITIONAL COMMENTS
Pt lives in private home with 2 steps to enter, no HR.  Once inside there are no stairs he needs to use. Pt has no medical equipment.

## 2022-06-18 NOTE — OCCUPATIONAL THERAPY INITIAL EVALUATION ADULT - LOWER BODY DRESSING, PREVIOUS LEVEL OF FUNCTION, OT EVAL
----- Message from Anahi Amezcua sent at 9/16/2020  2:55 PM CDT -----  Regarding: Mammo Request Order   Name of Who is Calling : KAYLA POOL [[id number]]    What is the request in detail :     Patient is requesting orders for a mammo    .....Please contact to further discuss and advise.    Can the clinic reply by [facility] : No    What Number to Call Back  :  [phone]          independent

## 2022-06-18 NOTE — PROGRESS NOTE ADULT - SUBJECTIVE AND OBJECTIVE BOX
CC: slurred speech, right arm numbness    INTERVAL HPI/OVERNIGHT EVENTS:  without acute events  mother feels speech mildly better  no new complaints from patient    Vital Signs Last 24 Hrs  T(C): 36.6 (18 Jun 2022 07:43), Max: 37 (18 Jun 2022 04:48)  T(F): 97.9 (18 Jun 2022 07:43), Max: 98.6 (18 Jun 2022 04:48)  HR: 72 (18 Jun 2022 07:43) (57 - 80)  BP: 141/90 (18 Jun 2022 07:43) (124/86 - 169/103)  BP(mean): 131 (17 Jun 2022 18:42) (131 - 131)  RR: 18 (18 Jun 2022 07:43) (16 - 18)  SpO2: 96% (18 Jun 2022 07:43) (95% - 99%)    PHYSICAL EXAM:  General: in no acute distress  Eyes: dysconjugate gaze, pupils equal  ENMT: No nasal discharge; airway clear  Respiratory: No wheezes, rales or rhonchi  Cardiovascular: Regular rate and rhythm. S1 and S2 Normal; No murmurs, gallops or rubs  Genitourinary: No costovertebral angle tenderness  Extremities: Normal range of motion, No clubbing, cyanosis or edema  Vascular: Peripheral pulses palpable 2+ bilaterally  Neurological: Alert and oriented x4; right arm/hand 4/5 and 5/5 in the left arm; equal strength in lower ext; right facial droop; right arm numbness; slurred speech  Skin: Warm and dry. No acute rash  Psychiatric: Cooperative and appropriate    I&O's Detail    CARDIAC MARKERS ( 17 Jun 2022 18:20 )  x     / <0.01 ng/mL / x     / x     / x                            15.8   5.69  )-----------( 204      ( 17 Jun 2022 18:20 )             48.1     17 Jun 2022 18:20    140    |  101    |  11.7   ----------------------------<  127    3.6     |  25.0   |  1.12     Ca    9.3        17 Jun 2022 18:20    TPro  7.7    /  Alb  4.5    /  TBili  0.7    /  DBili  x      /  AST  19     /  ALT  16     /  AlkPhos  42     17 Jun 2022 18:20    PT/INR - ( 17 Jun 2022 18:20 )   PT: 12.0 sec;   INR: 1.03 ratio      PTT - ( 17 Jun 2022 18:20 )  PTT:34.5 sec  CAPILLARY BLOOD GLUCOSE    POCT Blood Glucose.: 116 mg/dL (17 Jun 2022 18:09)    LIVER FUNCTIONS - ( 17 Jun 2022 18:20 )  Alb: 4.5 g/dL / Pro: 7.7 g/dL / ALK PHOS: 42 U/L / ALT: 16 U/L / AST: 19 U/L / GGT: x           MEDICATIONS  (STANDING):  apixaban 5 milliGRAM(s) Oral two times a day  aspirin  chewable 81 milliGRAM(s) Oral daily  enoxaparin Injectable 40 milliGRAM(s) SubCutaneous every 24 hours    MEDICATIONS  (PRN):  acetaminophen     Tablet .. 650 milliGRAM(s) Oral every 6 hours PRN Temp greater or equal to 38C (100.4F), Mild Pain (1 - 3)  traMADol 25 milliGRAM(s) Oral every 6 hours PRN Moderate Pain (4 - 6)      RADIOLOGY & ADDITIONAL TESTS:

## 2022-06-18 NOTE — OCCUPATIONAL THERAPY INITIAL EVALUATION ADULT - VISUAL ASSESSMENT: TRACKING
Pt with difficulty tracking during formal assessment.  Pt demonstrates no difficulty with functional mobility in busy ED environment.

## 2022-06-18 NOTE — OCCUPATIONAL THERAPY INITIAL EVALUATION ADULT - REHAB POTENTIAL, OT EVAL
OT eval only.  No inpatient OT needs.  Outpatient OT recommended to promote refined functional use of right UE, if needed.

## 2022-06-18 NOTE — CONSULT NOTE ADULT - ASSESSMENT
The patient is a 32y Male with cardiac history including atrial fibrillation and AICD and history of multiple strokes now with new stroke symptoms.    Stroke.   Check lipid panel and A1C (ordered).  Continue antiplatelet and statin.   PT/OT/ST.   MRI if cardiac device is compatible.  Otherwise repeat CT head.    A fib.  Continue anticoagulation.  Recommend cardiology evaluation.  If felt to be Eliquis failure, then may need to change to another agent.    Case discussed with Dr White.

## 2022-06-18 NOTE — CONSULT NOTE ADULT - SUBJECTIVE AND OBJECTIVE BOX
[facility]                                        [facility] [place]                                  Dany Christine, & Nolberto                                      [address]                                           [place], [place], [address]                                                [phone]        CC: Stroke    HISTORY:  The patient is a 32y Male with atrial fibrillation, AICD, and prior strokes now presented with slurred speech and right arm and face numbness and weakness.   Onset was around 4:30 the day of arrival.   He describes good compliance with the Eliquis and was therefore not a t-PA candidate.     PAST MEDICAL & SURGICAL HISTORY:  HTN (hypertension)  CHF (congestive heart failure)  TIA (transient ischemic attack)  Atrial fibrillation, unspecified type  CVA (cerebral vascular accident)  Cleft lip  H/O tracheostomy    MEDICATION PRIOR TO ADMISSION:  Carvedilol  Eliquis  Entresto    MEDICATIONS  (STANDING):  apixaban 5 milliGRAM(s) Oral two times a day  aspirin  chewable 81 milliGRAM(s) Oral daily  enoxaparin Injectable 40 milliGRAM(s) SubCutaneous every 24 hours    MEDICATIONS  (PRN):  acetaminophen     Tablet .. 650 milliGRAM(s) Oral every 6 hours PRN Temp greater or equal to 38C (100.4F), Mild Pain (1 - 3)  traMADol 25 milliGRAM(s) Oral every 6 hours PRN Moderate Pain (4 - 6)      Allergies  No Known Allergies    SOCIAL HISTORY:  Non smoker.     FAMILY HISTORY:  FH: pancreatic cancer (Aunt)  No known family history of stroke.     ROS:  Constitutional: The patient denies fevers or weight changes.  Neuro: As per HPI.  Eyes: Denies blurry vision.  Ears/nose/throat: Denies Tinnitus.   Cardiac: Denies chest pain. Denies palpitations.  Respiratory: Denies shortness of breath.  GI: Denies abdominal pain, nausea, or vomiting.  : Denies change in urinary pattern.  Integumentary: Denies rash.  Psych: Denies recent mood changes.  Heme: denies easy bleeding/bruising.    Exam:  Vital Signs Last 24 Hrs  T(C): 36.6 (18 Jun 2022 07:43), Max: 37 (18 Jun 2022 04:48)  T(F): 97.9 (18 Jun 2022 07:43), Max: 98.6 (18 Jun 2022 04:48)  HR: 72 (18 Jun 2022 07:43) (57 - 80)  BP: 141/90 (18 Jun 2022 07:43) (124/86 - 169/103)  BP(mean): 131 (17 Jun 2022 18:42) (131 - 131)  RR: 18 (18 Jun 2022 07:43) (16 - 18)  SpO2: 96% (18 Jun 2022 07:43) (95% - 99%)  General: NAD.   Carotid bruits absent.     Mental status: The patient is awake, alert, and fully oriented. There is no aphasia. There is marked dysarthria.    Cranial nerves: There is no papilledema. Pupils react symmetrically to light. There is no visual field deficit to confrontation. On primary gaze the left eye is somewhat externally deviated. Extraocular motion is full. Facial sensation is intact. Facial musculature is asymmetric with a depression of the right nasolabial fold. Palate elevates symmetrically. Tongue is midline.    Motor: There is normal bulk and tone.  Strength is 5/5 in the right arm and leg. There is mildly decreased fine finger movement on the right.  Strength is 5/5 in the left arm and leg.    Sensation: Intact to light touch and pin. There is no extinction to double simultaneous stimulation.    Reflexes: 1+ throughout and plantar responses are flexor.    Cerebellar: There is no dysmetria on finger to nose testing.    [facility] SS:   Date: 6/18/22  Time:   1a) Level of consciousness (0-3): 0  1b) Questions (0-2): 0  1c) Commands (0-2): 0  2  ) Gaze (0-2): 0  3  ) Visual field (0-3): 0  4  ) Facial palsy (0-3): 1  Motor  5a) Left arm (0-4): 0  5b) Right arm (0-4): 0  6a) Left leg (0-4): 0  6b) Right leg (0-4): 0  7  ) Ataxia (0-2): 0  Sensory  8  ) Sensory (0-2): 0  Speech  9  ) Language (0-3): 0  10) Dysarthria (0-2): 1  Extinction  11) Extinction/inattention (0-2): 0    Total score: 2    Prestroke Modified Justin: 1    (0: No symptoms and no disability.  1: No significant disability despite symptoms; able to carry out all usual duties and activities.  2: Slight disability; unable to carry out all previous activity but able to look after own affairs without assistance.  3: Moderate disability; requiring some help but able to walk without assistance.   4: Moderately severe disability; unable to walk without assistance and unable to attend to own bodily needs without assistance.  5: Severe disability; bedridden, incontinent and requiring constant nursing care and attention.   6: Dead. )     LABS:                         15.8   5.69  )-----------( 204      ( 17 Jun 2022 18:20 )             48.1       06-17    140  |  101  |  11.7  ----------------------------<  127<H>  3.6   |  25.0  |  1.12    Ca    9.3      17 Jun 2022 18:20    TPro  7.7  /  Alb  4.5  /  TBili  0.7  /  DBili  x   /  AST  19  /  ALT  16  /  AlkPhos  42  06-17      PT/INR - ( 17 Jun 2022 18:20 )   PT: 12.0 sec;   INR: 1.03 ratio    PTT - ( 17 Jun 2022 18:20 )  PTT:34.5 sec    RADIOLOGY   CT head images reviewed (and concur with report): There is no acute pathology. There are multiple chronic infarcts.     CT-A brain negative for large vessel occlusion.  CT-A neck negative for stenosis.

## 2022-06-18 NOTE — PATIENT PROFILE ADULT - FALL HARM RISK - UNIVERSAL INTERVENTIONS
Bed in lowest position, wheels locked, appropriate side rails in place/Call bell, personal items and telephone in reach/Instruct patient to call for assistance before getting out of bed or chair/Non-slip footwear when patient is out of bed/[place] to call system/Physically safe environment - no spills, clutter or unnecessary equipment/Purposeful Proactive Rounding/Room/bathroom lighting operational, light cord in reach

## 2022-06-18 NOTE — SWALLOW BEDSIDE ASSESSMENT ADULT - SWALLOW EVAL: DIAGNOSIS
Oral phase dysphagia marked by effortful mastication with mild stasis in R-lateral sulcus with easy to chew and regular. Oral phase WFL for puree, minced & moist, soft & bite sized, and thin liquid. Suspected pharyngeal phase dysphagia marked by increased WOB post-swallow with easy to chew and regular. No overt s/s aspiration with puree, minced & moist, soft & bite sized, and thin liquid.

## 2022-06-18 NOTE — OCCUPATIONAL THERAPY INITIAL EVALUATION ADULT - ADAPTIVE EQUIPMENT NEEDED
Post-Op Assessment Note      CV Status:  Stable    Mental Status:  Alert and awake    Hydration Status:  Euvolemic    PONV Controlled:  Controlled    Airway Patency:  Patent    Post Op Vitals Reviewed: Yes          Staff: Anesthesiologist           /79 (01/31/19 1725)    Temp      Pulse 98 (01/31/19 1725)   Resp 15 (01/31/19 1725)    SpO2 97 % (01/31/19 1725)
no

## 2022-06-18 NOTE — PATIENT PROFILE ADULT - FUNCTIONAL ASSESSMENT - DAILY ACTIVITY ASSESSMENT TYPE
Ct guided bone marrow biopsy performed by Dr Tripp.  Pt tolerated well.  Report called to [facility] on 6B.   Admission

## 2022-06-18 NOTE — OCCUPATIONAL THERAPY INITIAL EVALUATION ADULT - RANGE OF MOTION EXAMINATION, UPPER EXTREMITY
with increased time and effort for right UE/bilateral UE Active ROM was WFL  (within functional limits)

## 2022-06-18 NOTE — OCCUPATIONAL THERAPY INITIAL EVALUATION ADULT - LEVEL OF INDEPENDENCE: EATING, OT EVAL
modified independent - with increased time and effort for bilateral coordination tasks but pt compensates independently.

## 2022-06-18 NOTE — SWALLOW BEDSIDE ASSESSMENT ADULT - SLP GENERAL OBSERVATIONS
Pt received upright in bed, A&A+Ox3, R-sided facial weakness + swelling, +moderate-severe dysarthria, baseline congestion noted, wife present at bedside ([place]oc[place] speaking only, declined  services at this time), reporting 3-4/10 pain relating to facial swelling JOSSELIN Castro notified

## 2022-06-18 NOTE — PROGRESS NOTE ADULT - ASSESSMENT
33 yo male with PMH of afib on Eliquis, NICM, s/p ICD, ASD s/p repair, prior CVA was brought to the ED for slurry speech, and bilateral hand numbness and facial droop. LKW time 1630 today. Patient was awake and aware when it happened. denies any extremity motor weakness. After coming to the ED hand numbness resolved but persistent rt facial droop and facial numbness. Denies any headache, cp, sob, n/v, f/c, ab pain, cough congestion.    Impression:  # acute ischemic stroke suspected; unclear if lacunar vs secondary to a-fib; patient states he missed dose of medication in the AM for his blood pressure  - CT perfusion and ct angio head and neck with out acute pathology  - SDU with q 2 hr neurocheck and q2 vitals  - neurology consulted - discussed case  - cardiology consulted  - MR brain patient has ICD [employer] [id number],   Serial No. [id number]  - pt/ot consult  - seen by swallow - advanced to soft/bite-size diet  - dvt ppx  - echo ordered  - repeat labs in am    # NICM with decreased EF and ICD  - euvolemic  - order echo    # pAFIB:  - on eliquis - resumed    # ambulate with assistance 31 yo male with PMH of afib on Eliquis, NICM, s/p ICD, ASD s/p repair, prior CVA was brought to the ED for slurry speech, and bilateral hand numbness and facial droop. LKW time 1630 today. Patient was awake and aware when it happened. denies any extremity motor weakness. After coming to the ED hand numbness resolved but persistent rt facial droop and facial numbness. Denies any headache, cp, sob, n/v, f/c, ab pain, cough congestion.    Impression:  # acute ischemic stroke suspected; unclear if lacunar vs secondary to a-fib; patient states he missed dose of medication in the AM for his blood pressure  - CT perfusion and ct angio head and neck with out acute pathology  - SDU with q 2 hr neurocheck and q2 vitals  - neurology consulted - discussed case  - cardiology consulted  - MR brain patient has ICD [employer] [id number],   Serial No. [id number] - this is the [employer] brand of single chamber ICD and does not appear to be compatible with MRI  - pt/ot consult  - seen by swallow - advanced to soft/bite-size diet  - dvt ppx  - echo ordered  - repeat labs in am    # NICM with decreased EF and ICD  - euvolemic  - order echo    # pAFIB:  - on eliquis - resumed    # ambulate with assistance

## 2022-06-18 NOTE — CONSULT NOTE ADULT - PROBLEM SELECTOR RECOMMENDATION 2
Currently in SR  Device interrogation in process with [facility]  Continue Eliquis for now, may have to change if failure therapy is noted.    Ct Core[facility]

## 2022-06-18 NOTE — OCCUPATIONAL THERAPY INITIAL EVALUATION ADULT - PATIENT PROFILE REVIEW, REHAB EVAL
tried to give handing over to the floor nurse but unable to receive due to trying to settle another patient on the floor. will call again later after 10 mins. yes

## 2022-06-18 NOTE — CONSULT NOTE ADULT - ASSESSMENT
patient is unable to provide history due to slurry speech, history obtained from mother MARYA  31 yo male with PMH of afib on Eliquis, NICM, s/p ICD, ASD s/p repair, prior CVA, and  ASD closure 2014,  was brought to the ED for slurry speech, and bilateral hand numbness and facial numbness and droop.  Patient was awake and aware when it happened, his mother at side.  + for right hand  with decreased strength.    Denies any headache, cp, sob, n/v, f/c, ab pain, cough congestion.    In the ED, CT head, angio H & N negative  since patient has persistent symptoms he was admitted for further stroke work up.   Cardiac consult called for stroke etiology.    Patient follows with MD Pino.   Last visit with MD Pino was Dec 9, 2021.  His last echo from 2017 EF 45%, which had improved from 2012 which his EF was 20% patient is unable to provide history due to slurry speech, history obtained from mother MARYA  31 yo male with PMH of afib on Eliquis, NICM, s/p ICD, ASD s/p repair, prior CVA, and  ASD closure 2014,  was brought to the ED for slurry speech, and bilateral hand numbness and facial numbness and droop.  Patient was awake and aware when it happened, his mother at side.  + for right hand  with decreased strength.    Denies any headache, cp, sob, n/v, f/c, ab pain, cough congestion.    In the ED, CT head, angio H & N negative  since patient has persistent symptoms he was admitted for further stroke work up.   Cardiac consult called for stroke etiology.    Patient follows with MD Pino.   Last visit with MD Pino was Dec 9, 2021.  His last echo from 2017 EF 45%, which had improved from 2012 which his EF was 20%.  AICD - [facility] patient is unable to provide history due to slurry speech, history obtained from mother MARYA  33 yo male with PMH of afib on Eliquis, NICM, s/p ICD, ASD s/p repair, prior CVA, and  ASD closure 2014,  was brought to the ED for slurry speech, and bilateral hand numbness and facial numbness and droop.  Patient was awake and aware when it happened, his mother at side.  + for right hand  with decreased strength.    Denies any headache, cp, sob, n/v, f/c, ab pain, cough congestion.    In the ED, CT head, angio H & N negative  since patient has persistent symptoms he was admitted for further stroke work up.   Cardiac consult called for stroke etiology.    Patient follows with MD Pino.   Last visit with MD Pino was Dec 9, 2021.  Hist visit with MD Pino was Dec 9, 2021.  His last echo from 2017 EF 55%, which had improved from last spring which it was 45%, and from 2012 which his EF was 20%.  AICD - [facility] patient is unable to provide history due to slurry speech, history obtained from mother MARYA  31 yo male with PMH of afib on Eliquis, NICM, s/p ICD, ASD s/p repair, prior CVA, and  ASD closure 2014,  was brought to the ED for slurry speech, and bilateral hand numbness and facial numbness and droop.  Patient was awake and aware when it happened, his mother at side.  + for right hand  with decreased strength.    Denies any headache, cp, sob, n/v, f/c, ab pain, cough congestion.    In the ED, CT head, angio H & N negative  since patient has persistent symptoms he was admitted for further stroke work up.   Cardiac consult called for stroke etiology.    Patient follows with MD Pino.   Last visit with MD Pino was Dec 9, 2021.  Hist visit with MD Pino was Dec 9, 2021.  His last echo from 2017 EF 55%, which had improved from last spring which it was 45%.  AICD - [facility]

## 2022-06-18 NOTE — PHYSICAL THERAPY INITIAL EVALUATION ADULT - GENERAL OBSERVATIONS, REHAB EVAL
Patient received lying in bed, NAD, breathing RA, +tele. Pt agreeable to Physical Therapy evaluation.

## 2022-06-18 NOTE — SWALLOW BEDSIDE ASSESSMENT ADULT - SLP PERTINENT HISTORY OF CURRENT PROBLEM
As per MD, "patient is unable to provide history due to slurry speech, history obtained from mother MARYA. 33 yo male with PMH of afib on Eliquis, NICM, s/p ICD, ASD s/p repair, prior CVA was brought to the ED for slurry speech, and bilateral hand numbness and facial droop. LKW time 1630 today. Patient was awake and aware when it happened. denies any extremity motor weakness. After coming to the ED hand numbness resolved but persistent rt facial droop and facial numbness. Denies any headache, cp, sob, n/v, f/c, ab pain, cough congestion."

## 2022-06-18 NOTE — CONSULT NOTE ADULT - SUBJECTIVE AND OBJECTIVE BOX
[facility]                                              CARDIOLOGY AT [address], [place] [address]                                             Telephone: [phone]. Fax:[phone]                                                       CARDIOLOGY CONSULTATION NOTE                                                                                             History obtained by: Patient and medical record  Community Cardiologist:   Alvarez   obtained: Yes [  ] No [x ]  Reason for Consultation: New CVA  Available out pt records reviewed: Yes [ x ] No [  ]    Chief complaint:    Patient is a 32y old  Male who presents with a chief complaint of CVA (18 Jun 2022 10:32)      HPI:  patient is unable to provide history due to slurry speech, history obtained from mother MARYA  31 yo male with PMH of afib on Eliquis, NICM, s/p ICD, ASD s/p repair, prior CVA, and  ASD closure 2014,  was brought to the ED for slurry speech, and bilateral hand numbness and facial numbness and droop.  Patient was awake and aware when it happened, his mother at side.  + for right hand  with decreased strength.    Denies any headache, cp, sob, n/v, f/c, ab pain, cough congestion.    In the ED, CT head, angio H & N negative  since patient has persistent symptoms he was admitted for further stroke work up.   Cardiac consult called for stroke etiology.    Patient follows with MD Pino.   Last visit     ROS: negative except noted in HPI (17 Jun 2022 22:31)      CARDIAC TESTING   ECHO:    STRESS:    CATH:       ELECTROPHYSIOLOGY:  AICD    PAST MEDICAL HISTORY  HTN (hypertension)  CHF (congestive heart failure)  TIA (transient ischemic attack)  Atrial fibrillation, unspecified type  CVA (cerebral vascular accident)  Cleft lip      PAST SURGICAL HISTORY  H/O tracheostomy    SOCIAL HISTORY:  Denies smoking/alcohol/drugs  CIGARETTES:     ALCOHOL:  DRUGS:    FAMILY HISTORY:  FH: pancreatic cancer (Aunt)    Family History of Cardiovascular Disease:  Yes [  ] No [  ]  Coronary Artery Disease in first degree relative: Yes [  ] No [  ]  Sudden Cardiac Death in First degree relative: Yes [  ] No [  ]    HOME MEDICATIONS:  aspirin 81 mg oral delayed release capsule:  (17 Jun 2022 22:15)  CARVEDILOL 25 MG TABLET: take 1 tablet by mouth twice a day (17 Jun 2022 22:15)  ELIQUIS 5 MG TABLET: take 1 tablet by mouth twice a day (17 Jun 2022 22:15)  ENTRESTO 97 MG-103 MG TABLET: take 1 tablet by mouth twice a day (17 Jun 2022 22:15)      CURRENT CARDIAC MEDICATIONS:  CARVEDILOL 25 MG TABLET: take 1 tablet by mouth twice a day (17 Jun 2022 22:15)  ELIQUIS 5 MG TABLET: take 1 tablet by mouth twice a day (17 Jun 2022 22:15)  ENTRESTO 97 MG-103 MG TABLET: take 1 tablet by mouth twice a day (17 Jun 2022 22:15)    CURRENT OTHER MEDICATIONS:  acetaminophen     Tablet .. 650 milliGRAM(s) Oral every 6 hours PRN Temp greater or equal to 38C (100.4F), Mild Pain (1 - 3)  traMADol 25 milliGRAM(s) Oral every 6 hours PRN Moderate Pain (4 - 6)  apixaban 5 milliGRAM(s) Oral two times a day  aspirin  chewable 81 milliGRAM(s) Oral daily  enoxaparin Injectable 40 milliGRAM(s) SubCutaneous every 24 hours      ALLERGIES:   No Known Allergies    REVIEW OF SYMPTOMS:   CONSTITUTIONAL: No fever, no chills, no weight loss, no weight gain, no fatigue   ENMT:  No vertigo; No sinus or throat pain  NECK: No pain or stiffness  CARDIOVASCULAR: No chest pain, no dyspnea, no syncope/presyncope, no palpitations, no dizziness, no Orthopnea, no Paroxsymal nocturnal dyspnea  RESPIRATORY: no Shortness of breath, no cough, no wheezing  : No dysuria, no hematuria   GI: No dark color stool, no nausea, no diarrhea, no constipation, no abdominal pain   NEURO: No headache, no slurred speech   MUSCULOSKELETAL: No joint pain or swelling; No muscle, back, or extremity pain,   + for right hand  weakness  PSYCH: No agitation, no anxiety.    ALL OTHER REVIEW OF SYSTEMS ARE NEGATIVE.    VITAL SIGNS:  T(C): 36.6 (06-18-22 @ 07:43), Max: 37 (06-18-22 @ 04:48)  T(F): 97.9 (06-18-22 @ 07:43), Max: 98.6 (06-18-22 @ 04:48)  HR: 72 (06-18-22 @ 07:43) (57 - 80)  BP: 141/90 (06-18-22 @ 07:43) (124/86 - 169/103)  RR: 18 (06-18-22 @ 07:43) (16 - 18)  SpO2: 96% (06-18-22 @ 07:43) (95% - 99%)    INTAKE AND OUTPUT:     PHYSICAL EXAM:  Constitutional: Comfortable . No acute distress.   HEENT: Atraumatic and disproportioned eyes, and smile,  , neck is supple . no JVD. No carotid bruit.  CNS: A&Ox3. Right and  with weakness noted.  Respiratory: CTAB, unlabored   Cardiovascular: RRR normal s1 s2. No murmur. No rubs or gallop.  Gastrointestinal: Soft, non-tender. +Bowel sounds.   Extremities: 2+ Peripheral Pulses, No clubbing, cyanosis, or edema  Psychiatric: Calm . no agitation.   Skin: Warm and dry, no ulcers on extremities     LABS:  ( 17 Jun 2022 18:20 )  Troponin T  <0.01,  CPK  X    , CKMB  X    , BNP X                             15.8   5.69  )-----------( 204      ( 17 Jun 2022 18:20 )             48.1     06-17  140  |  101  |  11.7  ----------------------------<  127<H>  3.6   |  25.0  |  1.12    Ca    9.3      17 Jun 2022 18:20  TPro  7.7  /  Alb  4.5  /  TBili  0.7  /  DBili  x   /  AST  19  /  ALT  16  /  AlkPhos  42  06-17  PT/INR - ( 17 Jun 2022 18:20 )   PT: 12.0 sec;   INR: 1.03 ratio     PTT - ( 17 Jun 2022 18:20 )  PTT:34.5 sec    INTERPRETATION OF TELEMETRY:   Sr, no acute alarms noted.      ECG: NSR 66, Ischemic changes and q waves inferior leads, no new changes noted from old EKG  Prior ECG: Yes [ x ] No [  ]                                                    [facility]                                              CARDIOLOGY AT [address], [place] [address]                                             Telephone: [phone]. Fax:[phone]                                                       CARDIOLOGY CONSULTATION NOTE                                                                                             History obtained by: Patient and medical record  Community Cardiologist:   Alvarez   obtained: Yes [  ] No [x ]  Reason for Consultation: New CVA  Available out pt records reviewed: Yes [ x ] No [  ]    Chief complaint:    Patient is a 32y old  Male who presents with a chief complaint of CVA (18 Jun 2022 10:32)      HPI:  patient is unable to provide history due to slurry speech, history obtained from mother MARYA  31 yo male with PMH of afib on Eliquis, NICM, s/p ICD, ASD s/p repair, prior CVA, and  ASD closure 2014,  was brought to the ED for slurry speech, and bilateral hand numbness and facial numbness and droop.  Patient was awake and aware when it happened, his mother at side.  + for right hand  with decreased strength.    Denies any headache, cp, sob, n/v, f/c, ab pain, cough congestion.    In the ED, CT head, angio H & N negative  since patient has persistent symptoms he was admitted for further stroke work up.   Cardiac consult called for stroke etiology.    Patient follows with MD Pino.   Last visit with MD Pino was Dec 9, 2021.  His last echo from 2017 EF 45%, which had improved from 2012 which his EF was 20%.        ROS: negative except noted in HPI (17 Jun 2022 22:31)      CARDIAC TESTING   ECHO:  EXAM:  ECHO TRANSTHORACIC COMP W DOPP      PROCEDURE DATE:  Dec  2 2017   .      INTERPRETATION:  REPORT:    TRANSTHORACIC ECHOCARDIOGRAM REPORT           Patient Name:   SELVIN CUEVAS Patient Location: Inpatient  Medical Rec #:  [id number]      Accession #:      [id number]  Account #:                      Height:           61.8 in 157.0 cm  YOB: 1990       Weight:           158.7 lb 72.00 kg  Patient Age:    27 years        BSA:              1.73 m²  Patient Gender: M               BP:               119/84 mmHg        Date of Exam:        12/2/2017 12:31:40 PM  Sonographer:         Jesse Scales  Referring Physician: Luis Angel Gerber MD     Procedure:   2D Echo/Doppler/Color Doppler Complete.  Indications: Shortness of breath - R06.02  Diagnosis:   Atrial septal defect - Q21.1           2D AND M-MODE MEASUREMENTS (normal ranges within parentheses):  Left Ventricle:                 Normal    Aorta/Left Atrium:           Normal  IVSd (2D):              1.11 cm (0.7-1.1) Aortic Root (2D):  3.53 cm   (2.4-3.7)  LVPWd (2D):             1.11 cm (0.7-1.1) Left Atrium (2D):  4.30 cm   (1.9-4.0)  LVIDd (2D):             5.41 cm (3.4-5.7) Right Ventricle:  LVIDs (2D):             4.23 cm           RVd (2D):        2.82 cm  LV FS (2D):             21.8 %  (>25%)    TAPSE:           1.88 cm  Relative Wall Thickness 0.41    (<0.42)     LV SYSTOLIC FUNCTION BY 2D PLANIMETRY (MOD):  EF-A4C View: 36.5 % EF-A2C View: 36.9 % EF-Biplane: 38.5 %     LV DIASTOLIC FUNCTION:  MV Peak E: 0.51 m/s E/e' Ratio: 10.60  MV Peak A: 0.76 m/s Decel Time: 173 msec  E/A Ratio: 0.67     SPECTRAL DOPPLER ANALYSIS (where applicable):  Mitral Valve:  MV P1/2 Time: 50.17 msec  MV Area, PHT: 4.39 cm²     LVOT Vmax:  LVOT VTI:  LVOT Diameter: 1.99 cm     Tricuspid Valve and PA/RV Systolic Pressure: TR Max Velocity: 1.59 m/s   RA Pressure: 3 mmHg RVSP/PASP: 13.1 mmHg        PHYSICIAN INTERPRETATION:  Left Ventricle: The left ventricular internal cavity size is normal. Left   ventricular wall thickness is normal. There is mild concentric left   ventricular hypertrophy.  Global LV systolic function was moderately decreased. Left ventricular   ejection fraction, by visual estimation, is 35 to 40%. Normal segmental   left ventricular systolic function. Spectral Doppler shows impaired   relaxation pattern of left ventricular myocardial filling (Grade I   diastolic dysfunction).  Right Ventricle: The right ventricular size is normal. RV wall thickness   is normal. RV systolic function is low normal.  Left Atrium: Mildly enlarged left atrium. Atrial Septum repair device   visialized.  Right Atrium: The right atrium is normal in size.  Pericardium: There is no evidence of pericardial effusion.  Mitral Valve: The mitral valve is normal in structure. Mitral leaflet   mobility is normal. No evidence of mitral valve regurgitation is seen.  Tricuspid Valve: The tricuspid valve is normal in structure. No tricuspid   regurgitation is visualized.  Aortic Valve: The aortic valve is trileaflet. No evidence of aortic valve   regurgitation is seen.  Pulmonic Valve: The pulmonic valve is normal. No indication of pulmonic   valve regurgitation.  Aorta: The aortic root is normal in size and structure. The ascending   aorta was not well visualized. The aortic arch was not well visualized.   Aortic root measured at Sinus of Valsalva is normal.  Pulmonary Artery: The pulmonary artery is not well seen.  Venous: The pulmonary veins were not well visualized. The inferior vena   cava is normal. The inferior vena cava was normal sized, with respiratory   size variation greater than 50%. The inferior vena cava and the hepatic   vein show a normal flow pattern.  Additional Comments: A pacer wire is visualized in the right atrium and   right ventricle.      Summary:   1. Left ventricular ejection fraction, by visual estimation, is 35 to   40%.   2. Moderately decreased global left ventricular systolic function.   3. There is mild concentric left ventricular hypertrophy.   4. S/p ASD repair device, which appears well-seated. There is no   apparent interatrial shunt.   5. Mildly enlarged left atrium.   6. Spectral Doppler shows impaired relaxation pattern of left   ventricular myocardial filling (Grade I diastolic dysfunction).   MD Carloz Electronically signed on 12/2/2017 at 4:29:18 PM       *** Final ***        ALESHA SILVA MD  This document has been electronically signed. Dec  2 2017 12:31PM              STRESS:    CATH:       ELECTROPHYSIOLOGY:  AICD    PAST MEDICAL HISTORY  HTN (hypertension)  CHF (congestive heart failure)  TIA (transient ischemic attack)  Atrial fibrillation, unspecified type  CVA (cerebral vascular accident)  Cleft lip    PAST SURGICAL HISTORY  H/O tracheostomy    SOCIAL HISTORY:  Denies smoking/alcohol/drugs      FAMILY HISTORY:  FH: pancreatic cancer (Aunt)    HOME MEDICATIONS:  aspirin 81 mg oral delayed release capsule:  (17 Jun 2022 22:15)  CARVEDILOL 25 MG TABLET: take 1 tablet by mouth twice a day (17 Jun 2022 22:15)  ELIQUIS 5 MG TABLET: take 1 tablet by mouth twice a day (17 Jun 2022 22:15)  ENTRESTO 97 MG-103 MG TABLET: take 1 tablet by mouth twice a day (17 Jun 2022 22:15)    CURRENT CARDIAC MEDICATIONS:  CARVEDILOL 25 MG TABLET: take 1 tablet by mouth twice a day (17 Jun 2022 22:15)  ELIQUIS 5 MG TABLET: take 1 tablet by mouth twice a day (17 Jun 2022 22:15)  ENTRESTO 97 MG-103 MG TABLET: take 1 tablet by mouth twice a day (17 Jun 2022 22:15)    CURRENT OTHER MEDICATIONS:  acetaminophen     Tablet .. 650 milliGRAM(s) Oral every 6 hours PRN Temp greater or equal to 38C (100.4F), Mild Pain (1 - 3)  traMADol 25 milliGRAM(s) Oral every 6 hours PRN Moderate Pain (4 - 6)  apixaban 5 milliGRAM(s) Oral two times a day  aspirin  chewable 81 milliGRAM(s) Oral daily  enoxaparin Injectable 40 milliGRAM(s) SubCutaneous every 24 hours      ALLERGIES:   No Known Allergies    REVIEW OF SYMPTOMS:   CONSTITUTIONAL: No fever, no chills, no weight loss, no weight gain, no fatigue   ENMT:  No vertigo; No sinus or throat pain  NECK: No pain or stiffness  CARDIOVASCULAR: No chest pain, no dyspnea, no syncope/presyncope, no palpitations, no dizziness, no Orthopnea, no Paroxsymal nocturnal dyspnea  RESPIRATORY: no Shortness of breath, no cough, no wheezing  : No dysuria, no hematuria   GI: No dark color stool, no nausea, no diarrhea, no constipation, no abdominal pain   NEURO: No headache, no slurred speech   MUSCULOSKELETAL: No joint pain or swelling; No muscle, back, or extremity pain,   + for right hand  weakness  PSYCH: No agitation, no anxiety.    ALL OTHER REVIEW OF SYSTEMS ARE NEGATIVE.    VITAL SIGNS:  T(C): 36.6 (06-18-22 @ 07:43), Max: 37 (06-18-22 @ 04:48)  T(F): 97.9 (06-18-22 @ 07:43), Max: 98.6 (06-18-22 @ 04:48)  HR: 72 (06-18-22 @ 07:43) (57 - 80)  BP: 141/90 (06-18-22 @ 07:43) (124/86 - 169/103)  RR: 18 (06-18-22 @ 07:43) (16 - 18)  SpO2: 96% (06-18-22 @ 07:43) (95% - 99%)    INTAKE AND OUTPUT:     PHYSICAL EXAM:  Constitutional: Comfortable . No acute distress.   HEENT: Atraumatic and disproportioned eyes, and smile,  , neck is supple . no JVD. No carotid bruit.  CNS: A&Ox3. Right and  with weakness noted.  Respiratory: CTAB, unlabored   Cardiovascular: RRR normal s1 s2. No murmur. No rubs or gallop.  Gastrointestinal: Soft, non-tender. +Bowel sounds.   Extremities: 2+ Peripheral Pulses, No clubbing, cyanosis, or edema  Psychiatric: Calm . no agitation.   Skin: Warm and dry, no ulcers on extremities     LABS:  ( 17 Jun 2022 18:20 )  Troponin T  <0.01,  CPK  X    , CKMB  X    , BNP X                             15.8   5.69  )-----------( 204      ( 17 Jun 2022 18:20 )             48.1     06-17  140  |  101  |  11.7  ----------------------------<  127<H>  3.6   |  25.0  |  1.12    Ca    9.3      17 Jun 2022 18:20  TPro  7.7  /  Alb  4.5  /  TBili  0.7  /  DBili  x   /  AST  19  /  ALT  16  /  AlkPhos  42  06-17  PT/INR - ( 17 Jun 2022 18:20 )   PT: 12.0 sec;   INR: 1.03 ratio     PTT - ( 17 Jun 2022 18:20 )  PTT:34.5 sec    INTERPRETATION OF TELEMETRY:   Sr, no acute alarms noted.      ECG: NSR 66, Ischemic changes and q waves inferior leads, no new changes noted from old EKG  Prior ECG: Yes [ x ] No [  ]                                                    [facility]                                              CARDIOLOGY AT [address], [place] [address]                                             Telephone: [phone]. Fax:[phone]                                                       CARDIOLOGY CONSULTATION NOTE                                                                                             History obtained by: Patient and medical record  Community Cardiologist:   Alvarez   obtained: Yes [  ] No [x ]  Reason for Consultation: New CVA  Available out pt records reviewed: Yes [ x ] No [  ]    Chief complaint:    Patient is a 32y old  Male who presents with a chief complaint of CVA (18 Jun 2022 10:32)      HPI:  patient is unable to provide history due to slurry speech, history obtained from mother MARYA  33 yo male with PMH of afib on Eliquis, NICM, s/p ICD, ASD s/p repair, prior CVA, and  ASD closure 2014,  was brought to the ED for slurry speech, and bilateral hand numbness and facial numbness and droop.  Patient was awake and aware when it happened, his mother at side.  + for right hand  with decreased strength.    Denies any headache, cp, sob, n/v, f/c, ab pain, cough congestion.    In the ED, CT head, angio H & N negative  since patient has persistent symptoms he was admitted for further stroke work up.   Cardiac consult called for stroke etiology.    Patient follows with MD Pino.   Last visit with MD Pino was Dec 9, 2021.  His last echo from 2017 EF 55%, which had improved from last spring which it was 45%, and from 2012 which his EF was 20%.        ROS: negative except noted in HPI (17 Jun 2022 22:31)      CARDIAC TESTING   ECHO:  EXAM:  ECHO TRANSTHORACIC COMP W DOPP      PROCEDURE DATE:  Dec  2 2017   .      INTERPRETATION:  REPORT:    TRANSTHORACIC ECHOCARDIOGRAM REPORT           Patient Name:   SELVIN CUEVAS Patient Location: Inpatient  Medical Rec #:  [id number]      Accession #:      [id number]  Account #:                      Height:           61.8 in 157.0 cm  YOB: 1990       Weight:           158.7 lb 72.00 kg  Patient Age:    27 years        BSA:              1.73 m²  Patient Gender: M               BP:               119/84 mmHg        Date of Exam:        12/2/2017 12:31:40 PM  Sonographer:         Jesse Scales  Referring Physician: Luis Angel Gerber MD     Procedure:   2D Echo/Doppler/Color Doppler Complete.  Indications: Shortness of breath - R06.02  Diagnosis:   Atrial septal defect - Q21.1           2D AND M-MODE MEASUREMENTS (normal ranges within parentheses):  Left Ventricle:                 Normal    Aorta/Left Atrium:           Normal  IVSd (2D):              1.11 cm (0.7-1.1) Aortic Root (2D):  3.53 cm   (2.4-3.7)  LVPWd (2D):             1.11 cm (0.7-1.1) Left Atrium (2D):  4.30 cm   (1.9-4.0)  LVIDd (2D):             5.41 cm (3.4-5.7) Right Ventricle:  LVIDs (2D):             4.23 cm           RVd (2D):        2.82 cm  LV FS (2D):             21.8 %  (>25%)    TAPSE:           1.88 cm  Relative Wall Thickness 0.41    (<0.42)     LV SYSTOLIC FUNCTION BY 2D PLANIMETRY (MOD):  EF-A4C View: 36.5 % EF-A2C View: 36.9 % EF-Biplane: 38.5 %     LV DIASTOLIC FUNCTION:  MV Peak E: 0.51 m/s E/e' Ratio: 10.60  MV Peak A: 0.76 m/s Decel Time: 173 msec  E/A Ratio: 0.67     SPECTRAL DOPPLER ANALYSIS (where applicable):  Mitral Valve:  MV P1/2 Time: 50.17 msec  MV Area, PHT: 4.39 cm²     LVOT Vmax:  LVOT VTI:  LVOT Diameter: 1.99 cm     Tricuspid Valve and PA/RV Systolic Pressure: TR Max Velocity: 1.59 m/s   RA Pressure: 3 mmHg RVSP/PASP: 13.1 mmHg        PHYSICIAN INTERPRETATION:  Left Ventricle: The left ventricular internal cavity size is normal. Left   ventricular wall thickness is normal. There is mild concentric left   ventricular hypertrophy.  Global LV systolic function was moderately decreased. Left ventricular   ejection fraction, by visual estimation, is 35 to 40%. Normal segmental   left ventricular systolic function. Spectral Doppler shows impaired   relaxation pattern of left ventricular myocardial filling (Grade I   diastolic dysfunction).  Right Ventricle: The right ventricular size is normal. RV wall thickness   is normal. RV systolic function is low normal.  Left Atrium: Mildly enlarged left atrium. Atrial Septum repair device   visialized.  Right Atrium: The right atrium is normal in size.  Pericardium: There is no evidence of pericardial effusion.  Mitral Valve: The mitral valve is normal in structure. Mitral leaflet   mobility is normal. No evidence of mitral valve regurgitation is seen.  Tricuspid Valve: The tricuspid valve is normal in structure. No tricuspid   regurgitation is visualized.  Aortic Valve: The aortic valve is trileaflet. No evidence of aortic valve   regurgitation is seen.  Pulmonic Valve: The pulmonic valve is normal. No indication of pulmonic   valve regurgitation.  Aorta: The aortic root is normal in size and structure. The ascending   aorta was not well visualized. The aortic arch was not well visualized.   Aortic root measured at Sinus of Valsalva is normal.  Pulmonary Artery: The pulmonary artery is not well seen.  Venous: The pulmonary veins were not well visualized. The inferior vena   cava is normal. The inferior vena cava was normal sized, with respiratory   size variation greater than 50%. The inferior vena cava and the hepatic   vein show a normal flow pattern.  Additional Comments: A pacer wire is visualized in the right atrium and   right ventricle.      Summary:   1. Left ventricular ejection fraction, by visual estimation, is 35 to   40%.   2. Moderately decreased global left ventricular systolic function.   3. There is mild concentric left ventricular hypertrophy.   4. S/p ASD repair device, which appears well-seated. There is no   apparent interatrial shunt.   5. Mildly enlarged left atrium.   6. Spectral Doppler shows impaired relaxation pattern of left   ventricular myocardial filling (Grade I diastolic dysfunction).   MD Carloz Electronically signed on 12/2/2017 at 4:29:18 PM       *** Final ***        ALESHA SILVA MD  This document has been electronically signed. Dec  2 2017 12:31PM              STRESS:    CATH:       ELECTROPHYSIOLOGY:  AICD    PAST MEDICAL HISTORY  HTN (hypertension)  CHF (congestive heart failure)  TIA (transient ischemic attack)  Atrial fibrillation, unspecified type  CVA (cerebral vascular accident)  Cleft lip    PAST SURGICAL HISTORY  H/O tracheostomy    SOCIAL HISTORY:  Denies smoking/alcohol/drugs      FAMILY HISTORY:  FH: pancreatic cancer (Aunt)    HOME MEDICATIONS:  aspirin 81 mg oral delayed release capsule:  (17 Jun 2022 22:15)  CARVEDILOL 25 MG TABLET: take 1 tablet by mouth twice a day (17 Jun 2022 22:15)  ELIQUIS 5 MG TABLET: take 1 tablet by mouth twice a day (17 Jun 2022 22:15)  ENTRESTO 97 MG-103 MG TABLET: take 1 tablet by mouth twice a day (17 Jun 2022 22:15)    CURRENT CARDIAC MEDICATIONS:  CARVEDILOL 25 MG TABLET: take 1 tablet by mouth twice a day (17 Jun 2022 22:15)  ELIQUIS 5 MG TABLET: take 1 tablet by mouth twice a day (17 Jun 2022 22:15)  ENTRESTO 97 MG-103 MG TABLET: take 1 tablet by mouth twice a day (17 Jun 2022 22:15)    CURRENT OTHER MEDICATIONS:  acetaminophen     Tablet .. 650 milliGRAM(s) Oral every 6 hours PRN Temp greater or equal to 38C (100.4F), Mild Pain (1 - 3)  traMADol 25 milliGRAM(s) Oral every 6 hours PRN Moderate Pain (4 - 6)  apixaban 5 milliGRAM(s) Oral two times a day  aspirin  chewable 81 milliGRAM(s) Oral daily  enoxaparin Injectable 40 milliGRAM(s) SubCutaneous every 24 hours      ALLERGIES:   No Known Allergies    REVIEW OF SYMPTOMS:   CONSTITUTIONAL: No fever, no chills, no weight loss, no weight gain, no fatigue   ENMT:  No vertigo; No sinus or throat pain  NECK: No pain or stiffness  CARDIOVASCULAR: No chest pain, no dyspnea, no syncope/presyncope, no palpitations, no dizziness, no Orthopnea, no Paroxsymal nocturnal dyspnea  RESPIRATORY: no Shortness of breath, no cough, no wheezing  : No dysuria, no hematuria   GI: No dark color stool, no nausea, no diarrhea, no constipation, no abdominal pain   NEURO: No headache, no slurred speech   MUSCULOSKELETAL: No joint pain or swelling; No muscle, back, or extremity pain,   + for right hand  weakness  PSYCH: No agitation, no anxiety.    ALL OTHER REVIEW OF SYSTEMS ARE NEGATIVE.    VITAL SIGNS:  T(C): 36.6 (06-18-22 @ 07:43), Max: 37 (06-18-22 @ 04:48)  T(F): 97.9 (06-18-22 @ 07:43), Max: 98.6 (06-18-22 @ 04:48)  HR: 72 (06-18-22 @ 07:43) (57 - 80)  BP: 141/90 (06-18-22 @ 07:43) (124/86 - 169/103)  RR: 18 (06-18-22 @ 07:43) (16 - 18)  SpO2: 96% (06-18-22 @ 07:43) (95% - 99%)    INTAKE AND OUTPUT:     PHYSICAL EXAM:  Constitutional: Comfortable . No acute distress.   HEENT: Atraumatic and disproportioned eyes, and smile,  , neck is supple . no JVD. No carotid bruit.  CNS: A&Ox3. Right and  with weakness noted.  Respiratory: CTAB, unlabored   Cardiovascular: RRR normal s1 s2. No murmur. No rubs or gallop.  Gastrointestinal: Soft, non-tender. +Bowel sounds.   Extremities: 2+ Peripheral Pulses, No clubbing, cyanosis, or edema  Psychiatric: Calm . no agitation.   Skin: Warm and dry, no ulcers on extremities     LABS:  ( 17 Jun 2022 18:20 )  Troponin T  <0.01,  CPK  X    , CKMB  X    , BNP X                             15.8   5.69  )-----------( 204      ( 17 Jun 2022 18:20 )             48.1     06-17  140  |  101  |  11.7  ----------------------------<  127<H>  3.6   |  25.0  |  1.12    Ca    9.3      17 Jun 2022 18:20  TPro  7.7  /  Alb  4.5  /  TBili  0.7  /  DBili  x   /  AST  19  /  ALT  16  /  AlkPhos  42  06-17  PT/INR - ( 17 Jun 2022 18:20 )   PT: 12.0 sec;   INR: 1.03 ratio     PTT - ( 17 Jun 2022 18:20 )  PTT:34.5 sec    INTERPRETATION OF TELEMETRY:   Sr, no acute alarms noted.      ECG: NSR 66, Ischemic changes and q waves inferior leads, no new changes noted from old EKG  Prior ECG: Yes [ x ] No [  ]                                                    [facility]                                              CARDIOLOGY AT [address], [place] [address]                                             Telephone: [phone]. Fax:[phone]                                                       CARDIOLOGY CONSULTATION NOTE                                                                                             History obtained by: Patient and medical record  Community Cardiologist:   Alvarez   obtained: Yes [  ] No [x ]  Reason for Consultation: New CVA  Available out pt records reviewed: Yes [ x ] No [  ]    Chief complaint:    Patient is a 32y old  Male who presents with a chief complaint of CVA (18 Jun 2022 10:32)      HPI:  patient is unable to provide history due to slurry speech, history obtained from mother MARYA  33 yo male with PMH of afib on Eliquis, NICM, s/p ICD, ASD s/p repair, prior CVA, and  ASD closure 2014,  was brought to the ED for slurry speech, and bilateral hand numbness and facial numbness and droop.  Patient was awake and aware when it happened, his mother at side.  + for right hand  with decreased strength.    Denies any headache, cp, sob, n/v, f/c, ab pain, cough congestion.    In the ED, CT head, angio H & N negative  since patient has persistent symptoms he was admitted for further stroke work up.   Cardiac consult called for stroke etiology.    Patient follows with MD Pino.   Last visit with MD Pino was Dec 9, 2021.  His last echo from 2017 EF 55%, which had improved from last spring which it was 45%.        ROS: negative except noted in HPI (17 Jun 2022 22:31)      CARDIAC TESTING   ECHO:  EXAM:  ECHO TRANSTHORACIC COMP W DOPP      PROCEDURE DATE:  Dec  2 2017   .      INTERPRETATION:  REPORT:    TRANSTHORACIC ECHOCARDIOGRAM REPORT           Patient Name:   SELVIN CUEVAS Patient Location: Inpatient  Medical Rec #:  [id number]      Accession #:      [id number]  Account #:                      Height:           61.8 in 157.0 cm  YOB: 1990       Weight:           158.7 lb 72.00 kg  Patient Age:    27 years        BSA:              1.73 m²  Patient Gender: M               BP:               119/84 mmHg        Date of Exam:        12/2/2017 12:31:40 PM  Sonographer:         Jesse Scales  Referring Physician: Luis Angel Gerber MD     Procedure:   2D Echo/Doppler/Color Doppler Complete.  Indications: Shortness of breath - R06.02  Diagnosis:   Atrial septal defect - Q21.1           2D AND M-MODE MEASUREMENTS (normal ranges within parentheses):  Left Ventricle:                 Normal    Aorta/Left Atrium:           Normal  IVSd (2D):              1.11 cm (0.7-1.1) Aortic Root (2D):  3.53 cm   (2.4-3.7)  LVPWd (2D):             1.11 cm (0.7-1.1) Left Atrium (2D):  4.30 cm   (1.9-4.0)  LVIDd (2D):             5.41 cm (3.4-5.7) Right Ventricle:  LVIDs (2D):             4.23 cm           RVd (2D):        2.82 cm  LV FS (2D):             21.8 %  (>25%)    TAPSE:           1.88 cm  Relative Wall Thickness 0.41    (<0.42)     LV SYSTOLIC FUNCTION BY 2D PLANIMETRY (MOD):  EF-A4C View: 36.5 % EF-A2C View: 36.9 % EF-Biplane: 38.5 %     LV DIASTOLIC FUNCTION:  MV Peak E: 0.51 m/s E/e' Ratio: 10.60  MV Peak A: 0.76 m/s Decel Time: 173 msec  E/A Ratio: 0.67     SPECTRAL DOPPLER ANALYSIS (where applicable):  Mitral Valve:  MV P1/2 Time: 50.17 msec  MV Area, PHT: 4.39 cm²     LVOT Vmax:  LVOT VTI:  LVOT Diameter: 1.99 cm     Tricuspid Valve and PA/RV Systolic Pressure: TR Max Velocity: 1.59 m/s   RA Pressure: 3 mmHg RVSP/PASP: 13.1 mmHg        PHYSICIAN INTERPRETATION:  Left Ventricle: The left ventricular internal cavity size is normal. Left   ventricular wall thickness is normal. There is mild concentric left   ventricular hypertrophy.  Global LV systolic function was moderately decreased. Left ventricular   ejection fraction, by visual estimation, is 35 to 40%. Normal segmental   left ventricular systolic function. Spectral Doppler shows impaired   relaxation pattern of left ventricular myocardial filling (Grade I   diastolic dysfunction).  Right Ventricle: The right ventricular size is normal. RV wall thickness   is normal. RV systolic function is low normal.  Left Atrium: Mildly enlarged left atrium. Atrial Septum repair device   visialized.  Right Atrium: The right atrium is normal in size.  Pericardium: There is no evidence of pericardial effusion.  Mitral Valve: The mitral valve is normal in structure. Mitral leaflet   mobility is normal. No evidence of mitral valve regurgitation is seen.  Tricuspid Valve: The tricuspid valve is normal in structure. No tricuspid   regurgitation is visualized.  Aortic Valve: The aortic valve is trileaflet. No evidence of aortic valve   regurgitation is seen.  Pulmonic Valve: The pulmonic valve is normal. No indication of pulmonic   valve regurgitation.  Aorta: The aortic root is normal in size and structure. The ascending   aorta was not well visualized. The aortic arch was not well visualized.   Aortic root measured at Sinus of Valsalva is normal.  Pulmonary Artery: The pulmonary artery is not well seen.  Venous: The pulmonary veins were not well visualized. The inferior vena   cava is normal. The inferior vena cava was normal sized, with respiratory   size variation greater than 50%. The inferior vena cava and the hepatic   vein show a normal flow pattern.  Additional Comments: A pacer wire is visualized in the right atrium and   right ventricle.      Summary:   1. Left ventricular ejection fraction, by visual estimation, is 35 to   40%.   2. Moderately decreased global left ventricular systolic function.   3. There is mild concentric left ventricular hypertrophy.   4. S/p ASD repair device, which appears well-seated. There is no   apparent interatrial shunt.   5. Mildly enlarged left atrium.   6. Spectral Doppler shows impaired relaxation pattern of left   ventricular myocardial filling (Grade I diastolic dysfunction).   MD Carloz Electronically signed on 12/2/2017 at 4:29:18 PM       *** Final ***        ALESHA SILVA MD  This document has been electronically signed. Dec  2 2017 12:31PM              STRESS:    CATH:       ELECTROPHYSIOLOGY:  AICD    PAST MEDICAL HISTORY  HTN (hypertension)  CHF (congestive heart failure)  TIA (transient ischemic attack)  Atrial fibrillation, unspecified type  CVA (cerebral vascular accident)  Cleft lip    PAST SURGICAL HISTORY  H/O tracheostomy    SOCIAL HISTORY:  Denies smoking/alcohol/drugs      FAMILY HISTORY:  FH: pancreatic cancer (Aunt)    HOME MEDICATIONS:  aspirin 81 mg oral delayed release capsule:  (17 Jun 2022 22:15)  CARVEDILOL 25 MG TABLET: take 1 tablet by mouth twice a day (17 Jun 2022 22:15)  ELIQUIS 5 MG TABLET: take 1 tablet by mouth twice a day (17 Jun 2022 22:15)  ENTRESTO 97 MG-103 MG TABLET: take 1 tablet by mouth twice a day (17 Jun 2022 22:15)    CURRENT CARDIAC MEDICATIONS:  CARVEDILOL 25 MG TABLET: take 1 tablet by mouth twice a day (17 Jun 2022 22:15)  ELIQUIS 5 MG TABLET: take 1 tablet by mouth twice a day (17 Jun 2022 22:15)  ENTRESTO 97 MG-103 MG TABLET: take 1 tablet by mouth twice a day (17 Jun 2022 22:15)    CURRENT OTHER MEDICATIONS:  acetaminophen     Tablet .. 650 milliGRAM(s) Oral every 6 hours PRN Temp greater or equal to 38C (100.4F), Mild Pain (1 - 3)  traMADol 25 milliGRAM(s) Oral every 6 hours PRN Moderate Pain (4 - 6)  apixaban 5 milliGRAM(s) Oral two times a day  aspirin  chewable 81 milliGRAM(s) Oral daily  enoxaparin Injectable 40 milliGRAM(s) SubCutaneous every 24 hours      ALLERGIES:   No Known Allergies    REVIEW OF SYMPTOMS:   CONSTITUTIONAL: No fever, no chills, no weight loss, no weight gain, no fatigue   ENMT:  No vertigo; No sinus or throat pain  NECK: No pain or stiffness  CARDIOVASCULAR: No chest pain, no dyspnea, no syncope/presyncope, no palpitations, no dizziness, no Orthopnea, no Paroxsymal nocturnal dyspnea  RESPIRATORY: no Shortness of breath, no cough, no wheezing  : No dysuria, no hematuria   GI: No dark color stool, no nausea, no diarrhea, no constipation, no abdominal pain   NEURO: No headache, no slurred speech   MUSCULOSKELETAL: No joint pain or swelling; No muscle, back, or extremity pain,   + for right hand  weakness  PSYCH: No agitation, no anxiety.    ALL OTHER REVIEW OF SYSTEMS ARE NEGATIVE.    VITAL SIGNS:  T(C): 36.6 (06-18-22 @ 07:43), Max: 37 (06-18-22 @ 04:48)  T(F): 97.9 (06-18-22 @ 07:43), Max: 98.6 (06-18-22 @ 04:48)  HR: 72 (06-18-22 @ 07:43) (57 - 80)  BP: 141/90 (06-18-22 @ 07:43) (124/86 - 169/103)  RR: 18 (06-18-22 @ 07:43) (16 - 18)  SpO2: 96% (06-18-22 @ 07:43) (95% - 99%)    INTAKE AND OUTPUT:     PHYSICAL EXAM:  Constitutional: Comfortable . No acute distress.   HEENT: Atraumatic and disproportioned eyes, and smile,  , neck is supple . no JVD. No carotid bruit.  CNS: A&Ox3. Right and  with weakness noted.  Respiratory: CTAB, unlabored   Cardiovascular: RRR normal s1 s2. No murmur. No rubs or gallop.  Gastrointestinal: Soft, non-tender. +Bowel sounds.   Extremities: 2+ Peripheral Pulses, No clubbing, cyanosis, or edema  Psychiatric: Calm . no agitation.   Skin: Warm and dry, no ulcers on extremities     LABS:  ( 17 Jun 2022 18:20 )  Troponin T  <0.01,  CPK  X    , CKMB  X    , BNP X                             15.8   5.69  )-----------( 204      ( 17 Jun 2022 18:20 )             48.1     06-17  140  |  101  |  11.7  ----------------------------<  127<H>  3.6   |  25.0  |  1.12    Ca    9.3      17 Jun 2022 18:20  TPro  7.7  /  Alb  4.5  /  TBili  0.7  /  DBili  x   /  AST  19  /  ALT  16  /  AlkPhos  42  06-17  PT/INR - ( 17 Jun 2022 18:20 )   PT: 12.0 sec;   INR: 1.03 ratio     PTT - ( 17 Jun 2022 18:20 )  PTT:34.5 sec    INTERPRETATION OF TELEMETRY:   Sr, no acute alarms noted.      ECG: NSR 66, Ischemic changes and q waves inferior leads, no new changes noted from old EKG  Prior ECG: Yes [ x ] No [  ]

## 2022-06-18 NOTE — CONSULT NOTE ADULT - NS ATTEND AMEND GEN_ALL_CORE FT
31 y/o M with history of CVA, AF on AC, s/p ASD closure, prior CVA, presents with slurred speech, facial droop, and LUE weakness  MRI pending   Continue eliquis  If confirmed acute CVA will need alternate AC   Will request ICD interrogation and also check MRI compatibility

## 2022-06-19 LAB
A1C WITH ESTIMATED AVERAGE GLUCOSE RESULT: 5.6 % — SIGNIFICANT CHANGE UP (ref 4–5.6)
ALBUMIN SERPL ELPH-MCNC: 4.9 G/DL — SIGNIFICANT CHANGE UP (ref 3.3–5.2)
ALP SERPL-CCNC: 48 U/L — SIGNIFICANT CHANGE UP (ref 40–120)
ALT FLD-CCNC: 17 U/L — SIGNIFICANT CHANGE UP
ANION GAP SERPL CALC-SCNC: 19 MMOL/L — HIGH (ref 5–17)
APPEARANCE UR: CLEAR — SIGNIFICANT CHANGE UP
AST SERPL-CCNC: 24 U/L — SIGNIFICANT CHANGE UP
BILIRUB SERPL-MCNC: 1.3 MG/DL — SIGNIFICANT CHANGE UP (ref 0.4–2)
BILIRUB UR-MCNC: NEGATIVE — SIGNIFICANT CHANGE UP
BUN SERPL-MCNC: 10.5 MG/DL — SIGNIFICANT CHANGE UP (ref 8–20)
CALCIUM SERPL-MCNC: 10 MG/DL — SIGNIFICANT CHANGE UP (ref 8.6–10.2)
CHLORIDE SERPL-SCNC: 97 MMOL/L — LOW (ref 98–107)
CHOLEST SERPL-MCNC: 204 MG/DL — HIGH
CO2 SERPL-SCNC: 23 MMOL/L — SIGNIFICANT CHANGE UP (ref 22–29)
COLOR SPEC: YELLOW — SIGNIFICANT CHANGE UP
CREAT SERPL-MCNC: 0.86 MG/DL — SIGNIFICANT CHANGE UP (ref 0.5–1.3)
DIFF PNL FLD: NEGATIVE — SIGNIFICANT CHANGE UP
EGFR: 117 ML/MIN/1.73M2 — SIGNIFICANT CHANGE UP
ESTIMATED AVERAGE GLUCOSE: 114 MG/DL — SIGNIFICANT CHANGE UP (ref 68–114)
GLUCOSE SERPL-MCNC: 118 MG/DL — HIGH (ref 70–99)
GLUCOSE UR QL: NEGATIVE MG/DL — SIGNIFICANT CHANGE UP
HCT VFR BLD CALC: 55.7 % — HIGH (ref 39–50)
HDLC SERPL-MCNC: 78 MG/DL — SIGNIFICANT CHANGE UP
HGB BLD-MCNC: 18.2 G/DL — HIGH (ref 13–17)
KETONES UR-MCNC: ABNORMAL
LEUKOCYTE ESTERASE UR-ACNC: ABNORMAL
LIPID PNL WITH DIRECT LDL SERPL: 117 MG/DL — HIGH
MAGNESIUM SERPL-MCNC: 2 MG/DL — SIGNIFICANT CHANGE UP (ref 1.6–2.6)
MCHC RBC-ENTMCNC: 28 PG — SIGNIFICANT CHANGE UP (ref 27–34)
MCHC RBC-ENTMCNC: 32.7 GM/DL — SIGNIFICANT CHANGE UP (ref 32–36)
MCV RBC AUTO: 85.8 FL — SIGNIFICANT CHANGE UP (ref 80–100)
NITRITE UR-MCNC: NEGATIVE — SIGNIFICANT CHANGE UP
NON HDL CHOLESTEROL: 126 MG/DL — SIGNIFICANT CHANGE UP
PH UR: 6 — SIGNIFICANT CHANGE UP (ref 5–8)
PHOSPHATE SERPL-MCNC: 3 MG/DL — SIGNIFICANT CHANGE UP (ref 2.4–4.7)
PLATELET # BLD AUTO: 225 K/UL — SIGNIFICANT CHANGE UP (ref 150–400)
POTASSIUM SERPL-MCNC: 4.1 MMOL/L — SIGNIFICANT CHANGE UP (ref 3.5–5.3)
POTASSIUM SERPL-SCNC: 4.1 MMOL/L — SIGNIFICANT CHANGE UP (ref 3.5–5.3)
PROT SERPL-MCNC: 8.8 G/DL — HIGH (ref 6.6–8.7)
PROT UR-MCNC: 30 MG/DL
RBC # BLD: 6.49 M/UL — HIGH (ref 4.2–5.8)
RBC # FLD: 13.8 % — SIGNIFICANT CHANGE UP (ref 10.3–14.5)
SODIUM SERPL-SCNC: 139 MMOL/L — SIGNIFICANT CHANGE UP (ref 135–145)
SP GR SPEC: 1.02 — SIGNIFICANT CHANGE UP (ref 1.01–1.02)
TRIGL SERPL-MCNC: 43 MG/DL — SIGNIFICANT CHANGE UP
UROBILINOGEN FLD QL: NEGATIVE MG/DL — SIGNIFICANT CHANGE UP
WBC # BLD: 13.25 K/UL — HIGH (ref 3.8–10.5)
WBC # FLD AUTO: 13.25 K/UL — HIGH (ref 3.8–10.5)

## 2022-06-19 PROCEDURE — 99232 SBSQ HOSP IP/OBS MODERATE 35: CPT

## 2022-06-19 PROCEDURE — 99233 SBSQ HOSP IP/OBS HIGH 50: CPT

## 2022-06-19 RX ORDER — INFLUENZA VIRUS VACCINE 15; 15; 15; 15 UG/.5ML; UG/.5ML; UG/.5ML; UG/.5ML
0.5 SUSPENSION INTRAMUSCULAR ONCE
Refills: 0 | Status: DISCONTINUED | OUTPATIENT
Start: 2022-06-19 | End: 2022-06-19

## 2022-06-19 RX ORDER — CARVEDILOL PHOSPHATE 80 MG/1
3.12 CAPSULE, EXTENDED RELEASE ORAL EVERY 12 HOURS
Refills: 0 | Status: DISCONTINUED | OUTPATIENT
Start: 2022-06-19 | End: 2022-06-20

## 2022-06-19 RX ADMIN — Medication 10 MILLIGRAM(S): at 06:44

## 2022-06-19 RX ADMIN — Medication 10 MILLIGRAM(S): at 00:18

## 2022-06-19 RX ADMIN — APIXABAN 5 MILLIGRAM(S): 2.5 TABLET, FILM COATED ORAL at 17:55

## 2022-06-19 RX ADMIN — APIXABAN 5 MILLIGRAM(S): 2.5 TABLET, FILM COATED ORAL at 05:19

## 2022-06-19 RX ADMIN — Medication 81 MILLIGRAM(S): at 12:49

## 2022-06-19 NOTE — PROGRESS NOTE ADULT - SUBJECTIVE AND OBJECTIVE BOX
[facility]                                        Neurology at [place]                                 [facility]                                  [address]                                        [place], [place], [address]                                             [phone]        CC: Stroke    HPI:   The patient is a 32y Male with atrial fibrillation, AICD, and prior strokes now presented with slurred speech and right arm and face numbness and weakness.   Onset was around 4:30 the day of arrival.   He describes good compliance with the Eliquis and was therefore not a t-PA candidate.     Interim history:  Now on 3 [place].   Still with right face numbness and slurred speech.    ROS:   Denies headache or dizziness.  Denies chest pain.  Denies shortness of breath.    MEDICATIONS  (STANDING):  apixaban 5 milliGRAM(s) Oral two times a day  aspirin  chewable 81 milliGRAM(s) Oral daily      Vital Signs Last 24 Hrs  T(C): 36.9 (19 Jun 2022 08:00), Max: 37.2 (19 Jun 2022 00:00)  T(F): 98.4 (19 Jun 2022 08:00), Max: 98.9 (19 Jun 2022 00:00)  HR: 127 (19 Jun 2022 11:39) (69 - 127)  BP: 135/100 (19 Jun 2022 11:39) (135/100 - 174/114)  RR: 16 (19 Jun 2022 06:00) (16 - 18)  SpO2: 97% (19 Jun 2022 10:00) (95% - 100%)    Detailed Neurologic Exam:    Mental status: The patient is awake and alert. There is no aphasia. There is no dysarthria.     Cranial nerves: Pupils equal and react symmetrically to light. There is no visual field deficit to threat. On primary gaze the left eye is somewhat externally deviated. Extraocular motion is full. Facial sensation is dysesthetic on right. Facial musculature is asymmetric with a depression of the right nasolabial fold.  Palate elevates symmetrically. Tongue is midline.    Motor: There is normal bulk and tone.  There is no tremor.  Strength grossly 5/5 bilaterally. There is decreased fine finger movement on the right.    Sensation: Grossly intact to light touch and pin.    Reflexes: 1+ throughout and plantar responses are flexor.    Cerebellar: No dysmetria on finger nose testing.    Labs:     06-19    139  |  97<L>  |  10.5  ----------------------------<  118<H>  4.1   |  23.0  |  0.86    Ca    10.0      19 Jun 2022 09:16  Phos  3.0     06-19  Mg     2.0     06-19    TPro  8.8<H>  /  Alb  4.9  /  TBili  1.3  /  DBili  x   /  AST  24  /  ALT  17  /  AlkPhos  48  06-19                            18.2   13.25 )-----------( 225      ( 19 Jun 2022 09:16 )             55.7       LDL: 117  A1C: 5.6

## 2022-06-19 NOTE — PROGRESS NOTE ADULT - PROBLEM SELECTOR PLAN 1
Called to evaluate etiology of possible new CVA, patient presents with new CVA symptoms  HX. of CVA and ASD closure.  Also hx of AFib  CT perfusion and ct angio head and neck with out acute pathology  Continue telemetry  TTE pending   AICD interrogation to evaluate PAF episodes has been completed, last afib episode was 5/27/22 per verbal report.  Neuro following  lipid panel in am, fasting  Continue antiplatelet and statin.   MRI not compatible with AICD- repeat CT head.  Patient states his face on the right side is still feeling numb this am.    AICD not compatible with MRI, neurology aware and recommend to repeat CT.

## 2022-06-19 NOTE — PROGRESS NOTE ADULT - ASSESSMENT
32y Male with atrial fibrillation, NICM, AICD, ASD repair 2014, and prior strokes presented with slurred speech and right arm and face numbness and weakness.  Patient states he has compliance with the Eliquis and was therefore not a t-PA candidate.   CT head, angio H & N negative.    Patient follows with MD Pino.   Last visit with MD Pino was Dec 9, 2021.  His last echo from 2017 EF 45%, which had improved from 2012 which his EF was 20%.           Dispo:  Inpatient, pending TTE  Wait for Md for finale recomendations 32y Male with atrial fibrillation, NICM, AICD, ASD repair 2014, and prior strokes presented with slurred speech and right arm and face numbness and weakness.  Patient states he has compliance with the Eliquis and was therefore not a t-PA candidate.   CT head, angio H & N negative.    Patient follows with MD Pino.   Last visit with MD Pino was Dec 9, 2021.  His last echo from 2017 EF 55%, which had improved last spring which was 45% and from 2012 which his EF was 20%.          Dispo:  Inpatient, pending TTE  Wait for Md for finale recomendations 32y Male with atrial fibrillation, NICM, AICD, ASD repair 2014, and prior strokes presented with slurred speech and right arm and face numbness and weakness.  Patient states he has compliance with the Eliquis and was therefore not a t-PA candidate.   CT head, angio H & N negative.    Patient follows with MD Pino.   Last visit with MD Pino was Dec 9, 2021.  His last echo from 2017 EF 55%, which had improved last spring which was 45%.        Dispo:  Inpatient, pending TTE  Wait for Md for finale recomendations

## 2022-06-19 NOTE — PROGRESS NOTE ADULT - PROBLEM SELECTOR PLAN 2
Currently in SR  Device interrogation completed  Continue Eliquis for now, if confirmed CVA will need alternate AC  Ct Coreg.

## 2022-06-19 NOTE — PROGRESS NOTE ADULT - SUBJECTIVE AND OBJECTIVE BOX
[facility] Division of Hospital Medicine    Chief Complaint:      SUBJECTIVE / OVERNIGHT EVENTS:    Patient denies chest pain, SOB, abd pain, N/V, fever, chills, dysuria or any other complaints. All remainder ROS negative.     MEDICATIONS  (STANDING):  apixaban 5 milliGRAM(s) Oral two times a day  aspirin  chewable 81 milliGRAM(s) Oral daily    MEDICATIONS  (PRN):  acetaminophen     Tablet .. 650 milliGRAM(s) Oral every 6 hours PRN Temp greater or equal to 38C (100.4F), Mild Pain (1 - 3)  hydrALAZINE Injectable 10 milliGRAM(s) IV Push every 6 hours PRN SBP >160  melatonin 3 milliGRAM(s) Oral once PRN Sleep  traMADol 25 milliGRAM(s) Oral every 6 hours PRN Moderate Pain (4 - 6)        I&O's Summary    2022 07:01  -  2022 07:00  --------------------------------------------------------  IN: 0 mL / OUT: 120 mL / NET: -120 mL        PHYSICAL EXAM:  Vital Signs Last 24 Hrs  T(C): 36.9 (2022 08:00), Max: 37.2 (2022 00:00)  T(F): 98.4 (2022 08:00), Max: 98.9 (2022 00:00)  HR: 122 (2022 12:00) (69 - 127)  BP: 135/97 (2022 12:00) (135/97 - 174/114)  BP(mean): 104 (2022 12:00) (104 - 104)  RR: 16 (2022 06:00) (16 - 18)  SpO2: 95% (2022 12:00) (95% - 100%)        CONSTITUTIONAL: Non toxic appearing, lying in bed  ENMT: Moist oral mucosa, no pharyngeal injection or exudates; normal dentition  RESPIRATORY: Normal respiratory effort; lungs are clear to auscultation bilaterally  CARDIOVASCULAR: Regular rate and rhythm, normal S1 and S2, no murmur/rub/gallop; No lower extremity edema; Peripheral pulses are 2+ bilaterally  ABDOMEN: Nontender to palpation, normoactive bowel sounds, no rebound/guarding; No hepatosplenomegaly  MUSCLOSKELETAL:  Normal gait; no clubbing or cyanosis of digits; no joint swelling or tenderness to palpation  PSYCH: A+O to person, place, and time; affect appropriate  NEUROLOGY: CN 2-12 are intact and symmetric; no gross sensory deficits;   SKIN: No rashes; no palpable lesions    LABS:                        18.2   13.25 )-----------( 225      ( 2022 09:16 )             55.7     06-19    139  |  97<L>  |  10.5  ----------------------------<  118<H>  4.1   |  23.0  |  0.86    Ca    10.0      2022 09:16  Phos  3.0     06-19  Mg     2.0     -    TPro  8.8<H>  /  Alb  4.9  /  TBili  1.3  /  DBili  x   /  AST  24  /  ALT  17  /  AlkPhos  48  06-19    PT/INR - ( 2022 18:20 )   PT: 12.0 sec;   INR: 1.03 ratio         PTT - ( 2022 18:20 )  PTT:34.5 sec  CARDIAC MARKERS ( 2022 18:20 )  x     / <0.01 ng/mL / x     / x     / x          Urinalysis Basic - ( 2022 09:43 )    Color: Yellow / Appearance: Clear / S.020 / pH: x  Gluc: x / Ketone: Large  / Bili: Negative / Urobili: Negative mg/dL   Blood: x / Protein: 30 mg/dL / Nitrite: Negative   Leuk Esterase: Small / RBC: 0-2 /HPF / WBC 6-10 /HPF   Sq Epi: x / Non Sq Epi: Occasional / Bacteria: Few        CAPILLARY BLOOD GLUCOSE            RADIOLOGY & ADDITIONAL TESTS:  Results Reviewed:   Imaging Personally Reviewed:  Electrocardiogram Personally Reviewed:                                           [facility] of Hospital Medicine    Chief Complaint:  CVA    SUBJECTIVE / OVERNIGHT EVENTS:  Pt examined sitting up in bed  Patient denies chest pain, SOB, abd pain, N/V, fever, chills, dysuria or any other complaints. All remainder ROS negative.     MEDICATIONS  (STANDING):  apixaban 5 milliGRAM(s) Oral two times a day  aspirin  chewable 81 milliGRAM(s) Oral daily    MEDICATIONS  (PRN):  acetaminophen     Tablet .. 650 milliGRAM(s) Oral every 6 hours PRN Temp greater or equal to 38C (100.4F), Mild Pain (1 - 3)  hydrALAZINE Injectable 10 milliGRAM(s) IV Push every 6 hours PRN SBP >160  melatonin 3 milliGRAM(s) Oral once PRN Sleep  traMADol 25 milliGRAM(s) Oral every 6 hours PRN Moderate Pain (4 - 6)        I&O's Summary    2022 07:01  -  2022 07:00  --------------------------------------------------------  IN: 0 mL / OUT: 120 mL / NET: -120 mL        PHYSICAL EXAM:  Vital Signs Last 24 Hrs  T(C): 36.9 (2022 08:00), Max: 37.2 (2022 00:00)  T(F): 98.4 (2022 08:00), Max: 98.9 (2022 00:00)  HR: 122 (2022 12:00) (69 - 127)  BP: 135/97 (2022 12:00) (135/97 - 174/114)  BP(mean): 104 (2022 12:00) (104 - 104)  RR: 16 (2022 06:00) (16 - 18)  SpO2: 95% (2022 12:00) (95% - 100%)        CONSTITUTIONAL: Non toxic appearing, lying in bed  ENMT: Moist oral mucosa, no pharyngeal injection or exudates; normal dentition  RESPIRATORY: Normal respiratory effort; lungs are clear to auscultation bilaterally  CARDIOVASCULAR: Tachycardic Regular rhythm, normal S1 and S2, no murmur/rub/gallop; No lower extremity edema; Peripheral pulses are 2+ bilaterally  ABDOMEN: Nontender to palpation, normoactive bowel sounds, no rebound/guarding; No hepatosplenomegaly  MUSCLOSKELETAL:  Normal gait; no clubbing or cyanosis of digits; no joint swelling or tenderness to palpation  PSYCH: A+O to person, place, and time; affect appropriate  NEUROLOGY: Rt facial dysesthesia Chronic asymmetry of facial muscles, dysarthria   SKIN: No rashes; no palpable lesions    LABS:                        18.2   13.25 )-----------( 225      ( 2022 09:16 )             55.7     06-19    139  |  97<L>  |  10.5  ----------------------------<  118<H>  4.1   |  23.0  |  0.86    Ca    10.0      2022 09:16  Phos  3.0     06-  Mg     2.0     -19    TPro  8.8<H>  /  Alb  4.9  /  TBili  1.3  /  DBili  x   /  AST  24  /  ALT  17  /  AlkPhos  48  06-19    PT/INR - ( 2022 18:20 )   PT: 12.0 sec;   INR: 1.03 ratio         PTT - ( 2022 18:20 )  PTT:34.5 sec  CARDIAC MARKERS ( 2022 18:20 )  x     / <0.01 ng/mL / x     / x     / x          Urinalysis Basic - ( 2022 09:43 )    Color: Yellow / Appearance: Clear / S.020 / pH: x  Gluc: x / Ketone: Large  / Bili: Negative / Urobili: Negative mg/dL   Blood: x / Protein: 30 mg/dL / Nitrite: Negative   Leuk Esterase: Small / RBC: 0-2 /HPF / WBC 6-10 /HPF   Sq Epi: x / Non Sq Epi: Occasional / Bacteria: Few        CAPILLARY BLOOD GLUCOSE

## 2022-06-19 NOTE — PROGRESS NOTE ADULT - ASSESSMENT
32y Male with cardiac history including atrial fibrillation and AICD and history of multiple strokes now with new stroke symptoms.    Stroke.   LDL: 117  Goal: < 70  Continue antiplatelet and statin.   PT/OT/ST.   No MRI secondary to pacemaker.  Repeat CT head.    A fib.  Continue anticoagulation.  Cardiology following.   If felt to be Eliquis failure, then may need to change to another agent.    Case discussed with Dr Lim.

## 2022-06-19 NOTE — PROGRESS NOTE ADULT - ASSESSMENT
33 yo male with PMH of afib on Eliquis, NICM, s/p ICD, ASD s/p repair, prior CVA was brought to the ED for slurry speech, and bilateral hand numbness and facial droop. LKW time 1630 today. Patient was awake and aware when it happened. denies any extremity motor weakness. After coming to the ED hand numbness resolved but persistent rt facial droop and facial numbness. Denies any headache, cp, sob, n/v, f/c, ab pain, cough congestion.    32y Male with cardiac history including atrial fibrillation and AICD and history of multiple strokes now with new stroke symptoms.    Stroke.   LDL: 117  Goal: < 70  Continue antiplatelet and statin.   PT/OT/ST.   No MRI secondary to pacemaker.  Repeat CT head.    A fib.  Continue anticoagulation.  Cardiology following.   If felt to be Eliquis failure, then may need to change to another agent.    Case discussed with Dr Lim.     # acute ischemic stroke suspected; unclear if lacunar vs secondary to a-fib; patient states he missed dose of medication in the AM for his blood pressure  - CT perfusion and ct angio head and neck with out acute pathology  - SDU with q 2 hr neurocheck and q2 vitals  - neurology consulted - discussed case  - cardiology consulted  - MR brain patient has ICD [employer] [id number],   Serial No. [id number] - this is the [employer] brand of single chamber ICD and does not appear to be compatible with MRI  - pt/ot consult  - seen by swallow - advanced to soft/bite-size diet  - dvt ppx  - echo ordered  - repeat labs in am    # NICM with decreased EF and ICD  - euvolemic  - order echo    # pAFIB:  - on eliquis - resumed    # ambulate with assistance 31 yo male with PMH of afib on Eliquis, NICM, s/p ICD, ASD s/p repair, prior CVA was brought to the ED for slurry speech, and bilateral hand numbness and facial droop.     CVA   Continue Q2 neurochecks for now   CT with multiple prior infarctions  Now with Rt sided sensory changes  Unable to get MRI due to cardiac hardware (per report)   Will repeat CT head in 48 hours. Pending results may need to change AC for Eliquis failure   Will allow for permissive HTN upto 220/120 for now    Afib. Paroxysmal   In sinus tachycardia right now  Resume Carvedilol 3.125 BID, hold for SBP < 140 to avoid overcorrection of BP in initial 48 hours   ILR interrogation pending   Cardiology recs appreciated   Continue Eliquis for now      NICM with decreased EF and ICD  euvolemic  TTE pending       DVT ppx : Eliquis   ambulate with assistance

## 2022-06-19 NOTE — PROGRESS NOTE ADULT - SUBJECTIVE AND OBJECTIVE BOX
[facility]                                                         CARDIOLOGY AT [facility]                                                                  [address] [facility], [place] [place] [address]                                                         Telephone: [phone]. Fax:[phone]                                                                             PROGRESS NOTE    Reason for follow up:   suspected New stroke on exam.   Update:   Device interrogation, per verbal report from Rep brain.  Device is not MRI compatible.   22 episodes of afib since last interrogation.   Last episode 5/27/22 for 30 seconds.  Longest episode for 1 minute was back in august last year.   Seen by neurology.   Review of symptoms:   Cardiac:  No chest pain. No dyspnea. No palpitations.  Respiratory: no cough. No dyspnea  Gastrointestinal: No diarrhea. No abdominal pain. No bleeding.   Neuro: No focal neuro complaints.    Vitals:  T(C): 37.1 (06-19-22 @ 04:00), Max: 37.2 (06-19-22 @ 00:00)  HR: 107 (06-19-22 @ 06:00) (69 - 111)  BP: 157/93 (06-19-22 @ 07:01) (144/95 - 174/114)  RR: 16 (06-19-22 @ 06:00) (16 - 18)  SpO2: 95% (06-19-22 @ 06:00) (95% - 100%)    I&O's Summary  18 Jun 2022 07:01  -  19 Jun 2022 07:00  --------------------------------------------------------  IN: 0 mL / OUT: 120 mL / NET: -120 mL    Weight (kg): 75.75 (06-18 @ 11:19)    PHYSICAL EXAM:  Appearance: Comfortable. No acute distress  HEENT:  Atraumatic. Normocephalic.  Normal oral mucosa  Neurologic: A & O x 3, no gross focal deficits.  Cardiovascular: RRR S1 S2, No murmur, no rubs/gallops. No JVD  Respiratory: Lungs clear to auscultation, unlabored   Gastrointestinal:  Soft, Non-tender, + BS  Lower Extremities: 2+ Peripheral Pulses, No clubbing, cyanosis, or edema  Psychiatry: Patient is calm. No agitation.   Skin: warm and dry.    CURRENT CARDIAC MEDICATIONS:  hydrALAZINE Injectable 10 milliGRAM(s) IV Push every 6 hours PRN    CURRENT OTHER MEDICATIONS:  acetaminophen     Tablet .. 650 milliGRAM(s) Oral every 6 hours PRN Temp greater or equal to 38C (100.4F), Mild Pain (1 - 3)  melatonin 3 milliGRAM(s) Oral once PRN Sleep  traMADol 25 milliGRAM(s) Oral every 6 hours PRN Moderate Pain (4 - 6)  apixaban 5 milliGRAM(s) Oral two times a day  aspirin  chewable 81 milliGRAM(s) Oral daily      LABS:	 	  ( 17 Jun 2022 18:20 )  Troponin T  <0.01,  CPK  X    , CKMB  X    , BNP X                            15.8   5.69  )-----------( 204      ( 17 Jun 2022 18:20 )             48.1     06-17    140  |  101  |  11.7  ----------------------------<  127<H>  3.6   |  25.0  |  1.12    Ca    9.3      17 Jun 2022 18:20    TPro  7.7  /  Alb  4.5  /  TBili  0.7  /  DBili  x   /  AST  19  /  ALT  16  /  AlkPhos  42  06-17    PT/INR/PTT ( 17 Jun 2022 18:20 )      12.0         :       34.5                  .        .                   .              .           .       1.03        .                                         TELEMETRY:   ST pvc's and trigeminy noted, , pulse ox on ra 97%.

## 2022-06-20 LAB
CHOLEST SERPL-MCNC: 179 MG/DL — SIGNIFICANT CHANGE UP
HDLC SERPL-MCNC: 71 MG/DL — SIGNIFICANT CHANGE UP
LIPID PNL WITH DIRECT LDL SERPL: 96 MG/DL — SIGNIFICANT CHANGE UP
NON HDL CHOLESTEROL: 108 MG/DL — SIGNIFICANT CHANGE UP
TRIGL SERPL-MCNC: 58 MG/DL — SIGNIFICANT CHANGE UP

## 2022-06-20 PROCEDURE — 93306 TTE W/DOPPLER COMPLETE: CPT | Mod: 26

## 2022-06-20 PROCEDURE — 99233 SBSQ HOSP IP/OBS HIGH 50: CPT

## 2022-06-20 PROCEDURE — 99232 SBSQ HOSP IP/OBS MODERATE 35: CPT

## 2022-06-20 PROCEDURE — 70450 CT HEAD/BRAIN W/O DYE: CPT | Mod: 26

## 2022-06-20 RX ORDER — CARVEDILOL PHOSPHATE 80 MG/1
12.5 CAPSULE, EXTENDED RELEASE ORAL EVERY 12 HOURS
Refills: 0 | Status: DISCONTINUED | OUTPATIENT
Start: 2022-06-20 | End: 2022-06-22

## 2022-06-20 RX ORDER — ATORVASTATIN CALCIUM 80 MG/1
40 TABLET, FILM COATED ORAL AT BEDTIME
Refills: 0 | Status: DISCONTINUED | OUTPATIENT
Start: 2022-06-20 | End: 2022-06-22

## 2022-06-20 RX ADMIN — APIXABAN 5 MILLIGRAM(S): 2.5 TABLET, FILM COATED ORAL at 05:25

## 2022-06-20 RX ADMIN — Medication 81 MILLIGRAM(S): at 13:19

## 2022-06-20 RX ADMIN — APIXABAN 5 MILLIGRAM(S): 2.5 TABLET, FILM COATED ORAL at 17:01

## 2022-06-20 RX ADMIN — CARVEDILOL PHOSPHATE 12.5 MILLIGRAM(S): 80 CAPSULE, EXTENDED RELEASE ORAL at 17:01

## 2022-06-20 RX ADMIN — ATORVASTATIN CALCIUM 40 MILLIGRAM(S): 80 TABLET, FILM COATED ORAL at 21:23

## 2022-06-20 NOTE — PROGRESS NOTE ADULT - SUBJECTIVE AND OBJECTIVE BOX
[facility] Division of Hospital Medicine    Chief Complaint:  CVA    SUBJECTIVE / OVERNIGHT EVENTS:  Pt examined sitting up in bed  No acute complaints, continues to have facial numbness (Rt)  Awaiting repeat CT head   Patient denies chest pain, SOB, abd pain, N/V, fever, chills, dysuria or any other complaints. All remainder ROS negative.       MEDICATIONS  (STANDING):  MEDICATIONS  (STANDING):  apixaban 5 milliGRAM(s) Oral two times a day  aspirin  chewable 81 milliGRAM(s) Oral daily  atorvastatin 40 milliGRAM(s) Oral at bedtime    MEDICATIONS  (PRN):  acetaminophen     Tablet .. 650 milliGRAM(s) Oral every 6 hours PRN Temp greater or equal to 38C (100.4F), Mild Pain (1 - 3)  carvedilol 3.125 milliGRAM(s) Oral every 12 hours PRN HR > 110  melatonin 3 milliGRAM(s) Oral once PRN Sleep  traMADol 25 milliGRAM(s) Oral every 6 hours PRN Moderate Pain (4 - 6)            PHYSICAL EXAM:  Vital Signs Last 24 Hrs  T(C): 36.9 (2022 08:00), Max: 37.2 (2022 00:00)  T(F): 98.4 (2022 08:00), Max: 98.9 (2022 00:00)  HR: 122 (2022 12:00) (69 - 127)  BP: 135/97 (2022 12:00) (135/97 - 174/114)  BP(mean): 104 (2022 12:00) (104 - 104)  RR: 16 (2022 06:00) (16 - 18)  SpO2: 95% (2022 12:00) (95% - 100%)        CONSTITUTIONAL: Non toxic appearing, lying in bed  ENMT: Moist oral mucosa, no pharyngeal injection or exudates; normal dentition  RESPIRATORY: Normal respiratory effort; lungs are clear to auscultation bilaterally  CARDIOVASCULAR: Tachycardic Regular rhythm, normal S1 and S2, no murmur/rub/gallop; No lower extremity edema; Peripheral pulses are 2+ bilaterally  ABDOMEN: Nontender to palpation, normoactive bowel sounds, no rebound/guarding; No hepatosplenomegaly  MUSCLOSKELETAL:  Normal gait; no clubbing or cyanosis of digits; no joint swelling or tenderness to palpation  PSYCH: A+O to person, place, and time; affect appropriate  NEUROLOGY: Rt facial dysesthesia Chronic asymmetry of facial muscles, dysarthria   SKIN: No rashes; no palpable lesions    Labs :                        18.2   13.25 )-----------( 225      ( 2022 09:16 )             55.7   -    139  |  97<L>  |  10.5  ----------------------------<  118<H>  4.1   |  23.0  |  0.86    Ca    10.0      2022 09:16  Phos  3.0     -  Mg     2.0         TPro  8.8<H>  /  Alb  4.9  /  TBili  1.3  /  DBili  x   /  AST  24  /  ALT  17  /  AlkPhos  48  -         PTT - ( 2022 18:20 )  PTT:34.5 sec  CARDIAC MARKERS ( 2022 18:20 )  x     / <0.01 ng/mL / x     / x     / x          Urinalysis Basic - ( 2022 09:43 )    Color: Yellow / Appearance: Clear / S.020 / pH: x  Gluc: x / Ketone: Large  / Bili: Negative / Urobili: Negative mg/dL   Blood: x / Protein: 30 mg/dL / Nitrite: Negative   Leuk Esterase: Small / RBC: 0-2 /HPF / WBC 6-10 /HPF   Sq Epi: x / Non Sq Epi: Occasional / Bacteria: Few        CAPILLARY BLOOD GLUCOSE

## 2022-06-20 NOTE — PROGRESS NOTE ADULT - NS ATTEND AMEND GEN_ALL_CORE FT
Sarmad is well known to me. I saw and examined him this am   Hx of ASD repair, apical thrombus, afib and prior CVA. Pt with recurrent stroke while on AC.  Did not get TPA.   cont with ac.  HF meds started   plan for MEY tomorrow  Keep npo post midnight
31 y/o M with history of CVA, AF on AC, s/p ASD closure, NICM s/p ICD, presents with slurred speech, facial droop, and LUE weakness  Symptoms improved, but facial numbness still present   ICD is not MRI compatible; MRI deferred   CT head pending   Interrogation revealed PAF episodes as recently as 5/27/22  Continue eliquis. Patient reports compliance with AC; only missed 1 dose day symptoms started  If confirmed acute CVA will need alternate AC   BP management per neuro- if permissive HTN is the goal, would hold beta blocker in addition to entresto

## 2022-06-20 NOTE — PROGRESS NOTE ADULT - SUBJECTIVE AND OBJECTIVE BOX
[facility]                                        Neurology at [place]                                 Dany Christine, & Nolberto                                  [address] [facility]                                        [place], [place], [address]                                             [phone]        CC: Stroke    HPI:   The patient is a 32y Male with atrial fibrillation, AICD, and prior strokes now presented with slurred speech and right arm and face numbness and weakness.   Onset was around 4:30 the day of arrival.   He describes good compliance with the Eliquis and was therefore not a t-PA candidate. (from [facility])    Interim history:  Still with right face numbness and slurred speech ?baseline    ROS:   Denies headache or dizziness.  Denies chest pain.  Denies shortness of breath.    MEDICATIONS  (STANDING):  apixaban 5 milliGRAM(s) Oral two times a day  aspirin  chewable 81 milliGRAM(s) Oral daily  atorvastatin 40 milliGRAM(s) Oral at bedtime    MEDICATIONS  (PRN):  acetaminophen     Tablet .. 650 milliGRAM(s) Oral every 6 hours PRN Temp greater or equal to 38C (100.4F), Mild Pain (1 - 3)  carvedilol 3.125 milliGRAM(s) Oral every 12 hours PRN HR > 110  melatonin 3 milliGRAM(s) Oral once PRN Sleep  traMADol 25 milliGRAM(s) Oral every 6 hours PRN Moderate Pain (4 - 6)      Vital Signs Last 24 Hrs  T(C): 36.3 (20 Jun 2022 08:00), Max: 37.1 (19 Jun 2022 16:40)  T(F): 97.4 (20 Jun 2022 08:00), Max: 98.7 (19 Jun 2022 16:40)  HR: 96 (20 Jun 2022 11:38) (72 - 108)  BP: 123/79 (20 Jun 2022 11:38) (123/79 - 159/102)  BP(mean): 100 (20 Jun 2022 11:38) (100 - 116)  RR: 16 (20 Jun 2022 08:00) (16 - 18)  SpO2: 97% (20 Jun 2022 08:00) (94% - 97%)    Detailed Neurologic Exam:    Mental status: The patient is awake and alert. There is no aphasia. There is mild dysarthria, may be due to cleft palate    Cranial nerves: Pupils equal and react symmetrically to light. There is no visual field deficit to threat. On primary gaze the left eye is somewhat externally deviated. Extraocular motion is full. Facial sensation is symmetric . Facial musculature is asymmetric with a depression of the right nasolabial fold.  Palate elevates symmetrically. Tongue is midline.    Motor: There is normal bulk and tone.  There is no tremor.  Strength grossly 5/5 bilaterally. There is decreased fine finger movement on the right.    Sensation: Grossly intact to light touch and pin.    Reflexes: 1+ throughout and plantar responses are flexor.    Cerebellar: No dysmetria on finger nose testing.    Labs:                           18.2   13.25 )-----------( 225      ( 19 Jun 2022 09:16 )             55.7     06-19    139  |  97<L>  |  10.5  ----------------------------<  118<H>  4.1   |  23.0  |  0.86    Ca    10.0      19 Jun 2022 09:16  Phos  3.0     06-19  Mg     2.0     06-19    TPro  8.8<H>  /  Alb  4.9  /  TBili  1.3  /  DBili  x   /  AST  24  /  ALT  17  /  AlkPhos  48  06-19    LIVER FUNCTIONS - ( 19 Jun 2022 09:16 )  Alb: 4.9 g/dL / Pro: 8.8 g/dL / ALK PHOS: 48 U/L / ALT: 17 U/L / AST: 24 U/L / GGT: x             LDL: 117  A1C: 5.6    < from: TTE Echo Complete w/o Contrast w/ Doppler (06.20.22 @ 11:46) >    Summary:   1. Endocardial visualization was enhanced with intravenous echo contrast.   2. Left ventricular ejection fraction, by visual estimation, is 35 to   40%.   3. Moderately decreased global left ventricular systolic function.   4. Spectral Doppler shows impaired relaxation pattern of left   ventricular myocardial filling (Grade I diastolic dysfunction).   5. There is no evidence of pericardial effusion.   6. Mild thickening of the anterior and posterior mitral valve leaflets.   7. Trace mitral valve regurgitation.   8. Mild tricuspid regurgitation.   9. Color flow doppler and intravenous injection of agitated saline   demonstrates the presence of an intact intra atrial septum.  10. Compared to prior study dated 12/2017, no significant interval   changes have occurred.

## 2022-06-20 NOTE — PROGRESS NOTE ADULT - ASSESSMENT
32y Male with atrial fibrillation, NICM, AICD, ASD repair 2014, and prior strokes   presented with slurred speech and right arm and face numbness and weakness.    Patient states he has compliance with the Eliquis and was therefore not a t-PA candidate.   CT head, angio H & N negative.    Patient follows with MD Pino.     Last visit with MD Pino was Dec 9, 2021.  His last echo from 2017 EF 55%, which had improved last spring which was 45%.    6/20/22 Hemodynamically stable   Problem/Plan -   ·  Problem: CVA (cerebrovascular accident).   · AICD interrogation to evaluate PAF episode/ , last afib episode was 5/27/22 per verbal report.  ICD not compatible for MRI   Neuro f/u noted  continue ASA/ Eliquis and will add statin atorvastatin 40 daily    Problem/Plan -   ·  Problem: PAF (paroxysmal atrial fibrillation).   Rate controlled on AC Eliquis  / Currently in SR    HFrEF -NICM 35-40%   -ICD   Euvolemic   resume  Entresto priot to d/c    32y Male with atrial fibrillation, NICM, AICD, ASD repair 2014, and prior strokes   presented with slurred speech and right arm and face numbness and weakness.    Patient states he has compliance with the Eliquis and was therefore not a t-PA candidate.   CT head, angio H & N negative.    Patient follows with MD Pino.     Last visit with MD Pino was Dec 9, 2021.  His last echo from 2017 EF 55%, which had improved last spring which was 45%.    6/20/22 Hemodynamically stable   Problem/Plan -   ·  Problem: CVA (cerebrovascular accident).   · AICD interrogation to evaluate PAF episode/ , last afib episode was 5/27/22 per verbal report.  ICD not compatible for MRI / Repeat CT  *results noted with new infarcts/ will schedule for MEY   NPO MN   Neuro f/u noted  continue ASA/ Eliquis and will add statin atorvastatin 40 daily    Problem/Plan -   ·  Problem: PAF (paroxysmal atrial fibrillation).   Rate controlled on AC Eliquis  / Currently in SR    HFrEF -NICM 35-40%   -ICD   Euvolemic   resume  Entresto priot to d/c   d/w /Alesha Pino

## 2022-06-20 NOTE — PROGRESS NOTE ADULT - SUBJECTIVE AND OBJECTIVE BOX
[facility]                                                         CARDIOLOGY AT [facility]                                                                  [address] [facility], [place] [place] [address]                                                         Telephone: [phone]. Fax:[phone]                                                                             PROGRESS NOTE    Reason for follow up: CVA  Update: "feels ok " denies complaints of chest pain/sob/dizziness/palps has some right sided numbness of face and arm  brock diet OOB ambulating       Review of symptoms:   Cardiac:  No chest pain. No dyspnea. No palpitations.  Respiratory: no cough. No dyspnea  Gastrointestinal: No diarrhea. No abdominal pain. No bleeding.   Neuro: No focal neuro complaints.    Vitals:  T(C): 36.3 (06-20-22 @ 08:00), Max: 37.1 (06-19-22 @ 16:40)  HR: 80 (06-20-22 @ 08:00) (72 - 127)  BP: 159/95 (06-20-22 @ 08:00) (132/108 - 159/102)  RR: 16 (06-20-22 @ 08:00) (16 - 18)  SpO2: 97% (06-20-22 @ 08:00) (94% - 97%)  Wt(kg): --  I&O's Summary    Weight (kg): 75.75 (06-18 @ 11:19)    PHYSICAL EXAM:  Appearance: Comfortable. No acute distress  HEENT:  Atraumatic. Normocephalic.  Normal oral mucosa  Neurologic: A & O x 3, speech is slurred and slow /. Right sided numbness of face and arm   Cardiovascular: RRR S1 S2,RRR 70's  No murmur, no rubs/gallops. No JVD  Respiratory: Lungs clear to auscultation, unlabored   Gastrointestinal:  Soft, Non-tender, + BS  Lower Extremities: 2+ Peripheral Pulses, No clubbing, cyanosis, or edema  Psychiatry: Patient is calm. No agitation.   Skin: warm and dry.    CURRENT CARDIAC MEDICATIONS:  carvedilol 3.125 milliGRAM(s) Oral every 12 hours PRN      CURRENT OTHER MEDICATIONS:  acetaminophen     Tablet .. 650 milliGRAM(s) Oral every 6 hours PRN Temp greater or equal to 38C (100.4F), Mild Pain (1 - 3)  melatonin 3 milliGRAM(s) Oral once PRN Sleep  traMADol 25 milliGRAM(s) Oral every 6 hours PRN Moderate Pain (4 - 6)  apixaban 5 milliGRAM(s) Oral two times a day  aspirin  chewable 81 milliGRAM(s) Oral daily      LABS:	 	  ( 17 Jun 2022 18:20 )  Troponin T  <0.01,  CPK  X    , CKMB  X    , BNP X                                  18.2   13.25 )-----------( 225      ( 19 Jun 2022 09:16 )             55.7     06-19    139  |  97<L>  |  10.5  ----------------------------<  118<H>  4.1   |  23.0  |  0.86    Ca    10.0      19 Jun 2022 09:16  Phos  3.0     06-19  Mg     2.0     06-19    TPro  8.8<H>  /  Alb  4.9  /  TBili  1.3  /  DBili  x   /  AST  24  /  ALT  17  /  AlkPhos  48  06-19    PT/INR/PTT ( 17 Jun 2022 18:20 )                       :                       :      12.0         :       34.5                  .        .                   .              .           .       1.03        .                                       Lipid Profile: Date: 06-20 @ 07:36  Total cholesterol 179; Direct LDL: --; HDL: 71; Triglycerides:58  Date: 06-19 @ 09:16  Total cholesterol 204; Direct LDL: --; HDL: 78; Triglycerides:43      TELEMETRY: RSR 70;s short burst of AF to 130 not sustained     DIAGNOSTIC TESTING:  [ ] Echocardiogram:   < from: TTE Echo Complete w/Doppler (12.02.17 @ 12:31) >   Left ventricular ejection fraction, by visual estimation, is 35 to   40%.   2. Moderately decreased global left ventricular systolic function.   3. There is mild concentric left ventricular hypertrophy.   4. S/p ASD repair device,which appears well-seated. There is no   apparent interatrial shunt.   5. Mildly enlarged left atrium.   6. Spectral Doppler shows impaired relaxation pattern of left   ventricular myocardial filling (Grade I diastolic dysfunction).	      < from: CT Angio Head w/ IV Cont (06.17.22 @ 18:39) >  EAD CT: No acute intracranial hemorrhage. Chronic multifocal infarctions   as described above. If symptoms persist consider follow up head CT or MRI   if no contraindications.    ICD not compatible for MRI                                                              [facility]                                                         CARDIOLOGY AT [facility]                                                                  [address] [facility], [place] [place] [address]                                                         Telephone: [phone]. Fax:[phone]                                                                             PROGRESS NOTE    Reason for follow up: CVA  Update: "feels ok " denies complaints of chest pain/sob/dizziness/palps has some right sided numbness of face and arm  brock diet OOB ambulating       Review of symptoms:   Cardiac:  No chest pain. No dyspnea. No palpitations.  Respiratory: no cough. No dyspnea  Gastrointestinal: No diarrhea. No abdominal pain. No bleeding.   Neuro: No focal neuro complaints.    Vitals:  T(C): 36.3 (06-20-22 @ 08:00), Max: 37.1 (06-19-22 @ 16:40)  HR: 80 (06-20-22 @ 08:00) (72 - 127)  BP: 159/95 (06-20-22 @ 08:00) (132/108 - 159/102)  RR: 16 (06-20-22 @ 08:00) (16 - 18)  SpO2: 97% (06-20-22 @ 08:00) (94% - 97%)  Wt(kg): --  I&O's Summary    Weight (kg): 75.75 (06-18 @ 11:19)    PHYSICAL EXAM:  Appearance: Comfortable. No acute distress  HEENT:  Atraumatic. Normocephalic.  Normal oral mucosa  Neurologic: A & O x 3, speech is slurred and slow /. Right sided numbness of face and arm   Cardiovascular: RRR S1 S2,RRR 70's  No murmur, no rubs/gallops. No JVD  Respiratory: Lungs clear to auscultation, unlabored   Gastrointestinal:  Soft, Non-tender, + BS  Lower Extremities: 2+ Peripheral Pulses, No clubbing, cyanosis, or edema  Psychiatry: Patient is calm. No agitation.   Skin: warm and dry.    CURRENT CARDIAC MEDICATIONS:  carvedilol 3.125 milliGRAM(s) Oral every 12 hours PRN      CURRENT OTHER MEDICATIONS:  acetaminophen     Tablet .. 650 milliGRAM(s) Oral every 6 hours PRN Temp greater or equal to 38C (100.4F), Mild Pain (1 - 3)  melatonin 3 milliGRAM(s) Oral once PRN Sleep  traMADol 25 milliGRAM(s) Oral every 6 hours PRN Moderate Pain (4 - 6)  apixaban 5 milliGRAM(s) Oral two times a day  aspirin  chewable 81 milliGRAM(s) Oral daily      LABS:	 	  ( 17 Jun 2022 18:20 )  Troponin T  <0.01,  CPK  X    , CKMB  X    , BNP X                                  18.2   13.25 )-----------( 225      ( 19 Jun 2022 09:16 )             55.7     06-19    139  |  97<L>  |  10.5  ----------------------------<  118<H>  4.1   |  23.0  |  0.86    Ca    10.0      19 Jun 2022 09:16  Phos  3.0     06-19  Mg     2.0     06-19    TPro  8.8<H>  /  Alb  4.9  /  TBili  1.3  /  DBili  x   /  AST  24  /  ALT  17  /  AlkPhos  48  06-19    PT/INR/PTT ( 17 Jun 2022 18:20 )                       :                       :      12.0         :       34.5                  .        .                   .              .           .       1.03        .                                       Lipid Profile: Date: 06-20 @ 07:36  Total cholesterol 179; Direct LDL: --; HDL: 71; Triglycerides:58  Date: 06-19 @ 09:16  Total cholesterol 204; Direct LDL: --; HDL: 78; Triglycerides:43      TELEMETRY: RSR 70;s short burst of AF to 130 not sustained     DIAGNOSTIC TESTING:  [ ] Echocardiogram:   < from: TTE Echo Complete w/Doppler (12.02.17 @ 12:31) >   Left ventricular ejection fraction, by visual estimation, is 35 to   40%.   2. Moderately decreased global left ventricular systolic function.   3. There is mild concentric left ventricular hypertrophy.   4. S/p ASD repair device,which appears well-seated. There is no   apparent interatrial shunt.   5. Mildly enlarged left atrium.   6. Spectral Doppler shows impaired relaxation pattern of left   ventricular myocardial filling (Grade I diastolic dysfunction).	      < from: CT Angio Head w/ IV Cont (06.17.22 @ 18:39) >  EAD CT: No acute intracranial hemorrhage. Chronic multifocal infarctions   as described above. If symptoms persist consider follow up head CT or MRI   if no contraindications.    ICD not compatible for MRI    < from: CT Brain Stroke Protocol (06.20.22 @ 14:41) >  IMPRESSION:    -Interval development of infarct in the left lateral sensorimotor cortex.  -No acute intracranial hemorrhage.  -Multiple chronic infarcts as detailed above.

## 2022-06-20 NOTE — PROGRESS NOTE ADULT - ASSESSMENT
31 yo male with PMH of afib on Eliquis, NICM, s/p ICD, ASD s/p repair, prior CVA was brought to the ED for slurry speech, and bilateral hand numbness and facial droop.     CVA   Continue Q2 neurochecks for now   CT with multiple prior infarctions,   Repeat CT head now (ICD non compatible with MRI). Pending CT head may be able to transfer to Q4 neurochecks     Now with Rt sided sensory changes  Unable to get MRI due to cardiac hardware (per report)   Will repeat CT head in 48 hours. Pending results may need to change AC for Eliquis failure   Will allow for permissive HTN upto 220/120 for now    Afib. Paroxysmal   In sinus tachycardia right now  Resume Carvedilol 3.125 BID, hold for SBP < 140 to avoid overcorrection of BP in initial 48 hours   ILR interrogation pending   Cardiology recs appreciated   Continue Eliquis for now    NICM with decreased EF and ICD  euvolemic  TTE pending       DVT ppx : Eliquis   ambulate with assistance 33 yo male with PMH of afib on Eliquis, NICM, s/p ICD, ASD s/p repair, prior CVA was brought to the ED for slurry speech, and bilateral hand numbness and facial droop.     CVA   Continue Q2 neurochecks for now   CT with multiple prior infarctions,   Repeat CT head now (ICD non compatible with MRI). Pending CT head may be able to transfer to Q4 neurochecks   UA mildly pos, no symptoms  Can send Urine culture. Hold off abx for now    Now with Rt sided sensory changes  Unable to get MRI due to cardiac hardware (per report)   Will repeat CT head in 48 hours. Pending results may need to change AC for Eliquis failure   Will allow for permissive HTN upto 220/120 for now    Afib. Paroxysmal   In sinus tachycardia right now  Resume Carvedilol 3.125 BID, hold for SBP < 140 to avoid overcorrection of BP in initial 48 hours   ILR interrogation pending   Cardiology recs appreciated   Continue Eliquis for now    NICM with decreased EF and ICD  euvolemic  TTE pending       DVT ppx : Eliquis   ambulate with assistance

## 2022-06-20 NOTE — PROGRESS NOTE ADULT - ASSESSMENT
32y Male with cardiac history including atrial fibrillation and AICD and history of multiple strokes now with new stroke symptoms.    Stroke.   LDL: 117  Goal: < 70  Continue antiplatelet and statin.   PT/OT/ST.   No MRI secondary to pacemaker.  Repeat CT head to be done today    A fib.  Continue anticoagulation.  Cardiology following.   If felt to be Eliquis failure, then may need to change to another agent.    will follow with you    Jean Saenz MD PhD   [id number]

## 2022-06-21 LAB
ANION GAP SERPL CALC-SCNC: 12 MMOL/L — SIGNIFICANT CHANGE UP (ref 5–17)
BUN SERPL-MCNC: 14.4 MG/DL — SIGNIFICANT CHANGE UP (ref 8–20)
CALCIUM SERPL-MCNC: 8.9 MG/DL — SIGNIFICANT CHANGE UP (ref 8.6–10.2)
CHLORIDE SERPL-SCNC: 101 MMOL/L — SIGNIFICANT CHANGE UP (ref 98–107)
CO2 SERPL-SCNC: 27 MMOL/L — SIGNIFICANT CHANGE UP (ref 22–29)
CREAT SERPL-MCNC: 0.94 MG/DL — SIGNIFICANT CHANGE UP (ref 0.5–1.3)
CULTURE RESULTS: SIGNIFICANT CHANGE UP
EGFR: 110 ML/MIN/1.73M2 — SIGNIFICANT CHANGE UP
GLUCOSE SERPL-MCNC: 90 MG/DL — SIGNIFICANT CHANGE UP (ref 70–99)
HCT VFR BLD CALC: 49.4 % — SIGNIFICANT CHANGE UP (ref 39–50)
HGB BLD-MCNC: 16.1 G/DL — SIGNIFICANT CHANGE UP (ref 13–17)
MCHC RBC-ENTMCNC: 28.1 PG — SIGNIFICANT CHANGE UP (ref 27–34)
MCHC RBC-ENTMCNC: 32.6 GM/DL — SIGNIFICANT CHANGE UP (ref 32–36)
MCV RBC AUTO: 86.4 FL — SIGNIFICANT CHANGE UP (ref 80–100)
PLATELET # BLD AUTO: 196 K/UL — SIGNIFICANT CHANGE UP (ref 150–400)
POTASSIUM SERPL-MCNC: 4.1 MMOL/L — SIGNIFICANT CHANGE UP (ref 3.5–5.3)
POTASSIUM SERPL-SCNC: 4.1 MMOL/L — SIGNIFICANT CHANGE UP (ref 3.5–5.3)
RBC # BLD: 5.72 M/UL — SIGNIFICANT CHANGE UP (ref 4.2–5.8)
RBC # FLD: 13.5 % — SIGNIFICANT CHANGE UP (ref 10.3–14.5)
SARS-COV-2 RNA SPEC QL NAA+PROBE: SIGNIFICANT CHANGE UP
SODIUM SERPL-SCNC: 139 MMOL/L — SIGNIFICANT CHANGE UP (ref 135–145)
SPECIMEN SOURCE: SIGNIFICANT CHANGE UP
WBC # BLD: 7.22 K/UL — SIGNIFICANT CHANGE UP (ref 3.8–10.5)
WBC # FLD AUTO: 7.22 K/UL — SIGNIFICANT CHANGE UP (ref 3.8–10.5)

## 2022-06-21 PROCEDURE — 99232 SBSQ HOSP IP/OBS MODERATE 35: CPT

## 2022-06-21 RX ADMIN — Medication 81 MILLIGRAM(S): at 14:11

## 2022-06-21 RX ADMIN — CARVEDILOL PHOSPHATE 12.5 MILLIGRAM(S): 80 CAPSULE, EXTENDED RELEASE ORAL at 05:32

## 2022-06-21 RX ADMIN — APIXABAN 5 MILLIGRAM(S): 2.5 TABLET, FILM COATED ORAL at 17:50

## 2022-06-21 RX ADMIN — CARVEDILOL PHOSPHATE 12.5 MILLIGRAM(S): 80 CAPSULE, EXTENDED RELEASE ORAL at 17:49

## 2022-06-21 RX ADMIN — APIXABAN 5 MILLIGRAM(S): 2.5 TABLET, FILM COATED ORAL at 05:32

## 2022-06-21 RX ADMIN — ATORVASTATIN CALCIUM 40 MILLIGRAM(S): 80 TABLET, FILM COATED ORAL at 22:26

## 2022-06-21 NOTE — PROGRESS NOTE ADULT - SUBJECTIVE AND OBJECTIVE BOX
Department of Cardiology                                                                  [facility]/[place] [address] [address] [facility]                                                            Telephone: [phone]. Fax:[phone]                                                                                     Pre-MEY Note        Narrative:  32y Male with atrial fibrillation, NICM, AICD, ASD repair 2014, and prior strokes presented with slurred speech and right arm and face numbness and weakness.  Given compliance with NOAC, wasn't a candidate for Tpa. CT head, angio H & N negative.  However, repeat CT brain 6/20/2022 -Interval development of infarct in the left lateral sensorimotor cortex.    ASA and Mallampati: Per Anesthesia    	  MEDICATIONS:  carvedilol 12.5 milliGRAM(s) Oral every 12 hours        acetaminophen     Tablet .. 650 milliGRAM(s) Oral every 6 hours PRN  melatonin 3 milliGRAM(s) Oral once PRN  traMADol 25 milliGRAM(s) Oral every 6 hours PRN      atorvastatin 40 milliGRAM(s) Oral at bedtime    apixaban 5 milliGRAM(s) Oral two times a day  aspirin  chewable 81 milliGRAM(s) Oral daily    REVIEW OF SYSTEMS:    CONSTITUTIONAL: No weakness, fevers or chills  EYES/ENT: No visual changes;  No vertigo or throat pain, mild numbness to fight side of face, slurred speech possible baseline   NECK: No pain or stiffness  RESPIRATORY: No cough, wheezing, hemoptysis; No shortness of breath  CARDIOVASCULAR: No chest pain or palpitations  GASTROINTESTINAL: No abdominal or epigastric pain. No nausea, vomiting, or hematemesis; No diarrhea or constipation. No melena or hematochezia.  GENITOURINARY: No dysuria, frequency or hematuria  NEUROLOGICAL: No numbness or weakness  SKIN: No itching, burning, rashes, or lesions   All other review of systems is negative unless indicated above.      PHYSICAL EXAM:    T(C): 36.6 (06-21-22 @ 08:00), Max: 37 (06-20-22 @ 12:00)  HR: 70 (06-21-22 @ 08:00) (70 - 96)  BP: 135/96 (06-21-22 @ 08:00) (113/88 - 147/110)  RR: 19 (06-21-22 @ 08:00) (16 - 19)  SpO2: 94% (06-21-22 @ 08:00) (94% - 98%)    Constitutional: A & O x 3  HEENT:   Normal oral mucosa, PERRL, EOMI	  Cardiovascular: Normal S1 S2, No JVD, No murmurs, No edema  Respiratory: Lungs clear to auscultation	  Gastrointestinal:  Soft, Non-tender, + BS	  Skin: No rashes, No ecchymoses, No cyanosis  Neurologic: Non-focal  Extremities: Normal range of motion, No clubbing, cyanosis or edema  Vascular: Peripheral pulses palpable 2+ bilaterally    TELEMETRY: RSR    ECG:  NSR 66bpm 	  RADIOLOGY:   < from: CT Brain Stroke Protocol (06.20.22 @ 14:41) >  ACC: [id number] EXAM:  CT BRAIN STROKE PROTOCOL                          PROCEDURE DATE:  06/20/2022          INTERPRETATION:  CLINICAL INDICATION: Prior CVAs with new CVA.    TECHNIQUE: CT of the head was performed without the administration of   intravenous contrast.    COMPARISON: CT head 6/17/2022.    FINDINGS:  Loss of gray-white matter differentiation is noted involving the left   lateral sensorimotor cortex (3-32).    Chronic infarcts are again seen involving the right cerebellum, bilateral   medial thalami, and left frontal centrum semiovale.    No acute intracranial hemorrhage is identified.    No hydrocephalus. No extra-axial fluid collections.    The visualized intraorbital contents are unremarkable. The imaged   portions of the paranasal sinuses are clear. The mastoid air cells are   clear.    IMPRESSION:    -Interval development of infarct in the left lateral sensorimotor cortex.  -No acute intracranial hemorrhage.  -Multiple chronic infarcts as detailed above.    Findings discussed with MANPREET Basruto at 2:57 PM 6/20/2022.        DIAGNOSTIC TESTING:  [ x] Echocardiogram:  < from: TTE Echo Complete w/o Contrast w/ Doppler (06.20.22 @ 11:46) >  ACC: [id number] EXAM:  ECHO TTE WO CON COMP W DOPP                          PROCEDURE DATE:  06/20/2022          INTERPRETATION:  TRANSTHORACIC ECHOCARDIOGRAM REPORT        Patient Name:   SELVIN CUEVAS Patient Location: Inpatient  Medical Rec #:  [id number]      Accession #:      [id number]  Account #:                      Height:           63.0 in 160.0 cm  YOB: 1990       Weight:           149.9 lb 68.00 kg  Patient Age:    32 years        BSA:              1.71 m²  Patient Gender: M               BP:               132/108 mmHg      Date of Exam:        6/20/2022 11:46:49 AM  Sonographer:         Manju Garcia  Referring Physician: Michelle Cai    Procedure:   2D Echo/Doppler/Color Doppler Complete, Echocardiogram with             Definity Contrast and Saline Contrast (Bubble Study).  Indications: Cardiomyopathy, unspecified - I42.9  Diagnosis:   Cardiomyopathy, unspecified - I42.9        2D AND M-MODE MEASUREMENTS (normal ranges within parentheses):  Left      Normal   Aorta/Left            Normal  Ventricle:                    Atrium:  IVSd (2D):    1.10  (0.7-1.1) Aortic Root    3.20  (2.4-3.7)                 cm             (Mmode):        cm  LVPWd (2D):   1.11  (0.7-1.1) Left Atrium    3.40  (1.9-4.0)                 cm             (2D):           cm  LVIDd (2D):   5.42  (3.4-5.7) LA Volume      13.8                 cm             Index         ml/m²  LVIDs (2D):   4.42                 cm  LV FS (2D):   18.5   (>25%)                  %  Relative Wall 0.41   (<0.42)  Thickness    LV DIASTOLIC FUNCTION:  MV Peak E: 0.38 m/s Decel Time: 183 msec  MV Peak A: 0.64 m/s  E/A Ratio: 0.59    SPECTRAL DOPPLER ANALYSIS (where applicable):  Mitral Valve:  MV P1/2 Time: 53.07 msec  MV Area, PHT: 4.15cm²    LVOT Vmax:  LVOT VTI:  LVOT Diameter: 2.20 cm    Tricuspid Valve and PA/RV Systolic Pressure: TR Max Velocity: 2.23 m/s RA   Pressure: 3 mmHg RVSP/PASP: 22.9 mmHg      PHYSICIAN INTERPRETATION:  Left Ventricle: Endocardial visualization was enhanced with intravenous   echo contrast. The left ventricular internal cavity size is normal.  Global LV systolic function was moderately decreased. Left ventricular   ejection fraction, by visual estimation, is 35 to 40%. Spectral Doppler   shows impaired relaxation pattern of left ventricular myocardial filling   (Grade I diastolic dysfunction).  Right Ventricle: The right ventricular size is normal. RV systolic   function is low normal.  Left Atrium: The left atrium is normal in size. Color flow doppler and   intravenous injection of agitated saline demonstrates the presence of an   intact intra atrial septum.  Right Atrium: The right atrium is normal in size.  Pericardium: There is no evidence of pericardial effusion.  Mitral Valve: Mild thickening of the anterior and posterior mitral valve   leaflets. Trace mitral valve regurgitation is seen.  Tricuspid Valve: The tricuspid valve is normal in structure. Mild   tricuspid regurgitation is visualized.  Aortic Valve: The aortic valve is trileaflet. No evidence of aortic valve   regurgitation is seen.  Pulmonic Valve: Structurally normal pulmonic valve, with normal leaflet   excursion. Trace pulmonic valve regurgitation.  Aorta: The aortic root is normal in size and structure.  Pulmonary Artery: The main pulmonary artery is normal in size.  Venous: The inferior vena cava was normal sized, with respiratory size   variation greater than 50%.  Shunts: Agitated saline contrast was given intravenously to evaluate for   intracardiac shunting.  In comparison to the previous echocardiogram(s): Prior examinations are   available and were reviewed for comparison purposes. Compared to prior   study dated 12/2017, no significant interval changes have occurred.      Summary:   1. Endocardial visualization was enhanced with intravenous echo contrast.   2. Left ventricular ejection fraction, by visual estimation, is 35 to   40%.   3. Moderately decreased global left ventricular systolic function.   4. Spectral Doppler shows impaired relaxation pattern of left   ventricular myocardial filling (Grade I diastolic dysfunction).   5. There is no evidence of pericardial effusion.   6. Mild thickening of the anterior and posterior mitral valve leaflets.   7. Trace mitral valve regurgitation.   8. Mild tricuspid regurgitation.   9. Color flow doppler and intravenous injection of agitated saline   demonstrates the presence of an intact intra atrial septum.  10. Compared to prior study dated 12/2017, no significant interval         LABS:	 	    CARDIAC MARKERS:                            16.1   7.22  )-----------( 196      ( 21 Jun 2022 06:46 )             49.4     06-21    139  |  101  |  14.4  ----------------------------<  90  4.1   |  27.0  |  0.94    Ca    8.9      21 Jun 2022 06:46                                                                                 Department of Cardiology                                                                  [facility]/[place] [address] [address] [facility]                                                            Telephone: [phone]. Fax:[phone]                                                                                     Pre-MEY Note        Narrative:  32y Male with atrial fibrillation, NICM, AICD, ASD repair 2014, and prior strokes  s/p ASD closure repair 2013  presented with slurred speech and right arm and face numbness and weakness.  Given compliance with NOAC, wasn't a candidate for Tpa. CT head, angio H & N negative.  However, repeat CT brain 6/20/2022 -Interval development of infarct in the left lateral sensorimotor cortex.    ASA and Mallampati: Per Anesthesia    	  MEDICATIONS:  carvedilol 12.5 milliGRAM(s) Oral every 12 hours        acetaminophen     Tablet .. 650 milliGRAM(s) Oral every 6 hours PRN  melatonin 3 milliGRAM(s) Oral once PRN  traMADol 25 milliGRAM(s) Oral every 6 hours PRN      atorvastatin 40 milliGRAM(s) Oral at bedtime    apixaban 5 milliGRAM(s) Oral two times a day  aspirin  chewable 81 milliGRAM(s) Oral daily    REVIEW OF SYSTEMS:    CONSTITUTIONAL: No weakness, fevers or chills  EYES/ENT: No visual changes;  No vertigo or throat pain, mild numbness to fight side of face, slurred speech possible baseline   NECK: No pain or stiffness  RESPIRATORY: No cough, wheezing, hemoptysis; No shortness of breath  CARDIOVASCULAR: No chest pain or palpitations  GASTROINTESTINAL: No abdominal or epigastric pain. No nausea, vomiting, or hematemesis; No diarrhea or constipation. No melena or hematochezia.  GENITOURINARY: No dysuria, frequency or hematuria  NEUROLOGICAL: No numbness or weakness  SKIN: No itching, burning, rashes, or lesions   All other review of systems is negative unless indicated above.      PHYSICAL EXAM:    T(C): 36.6 (06-21-22 @ 08:00), Max: 37 (06-20-22 @ 12:00)  HR: 70 (06-21-22 @ 08:00) (70 - 96)  BP: 135/96 (06-21-22 @ 08:00) (113/88 - 147/110)  RR: 19 (06-21-22 @ 08:00) (16 - 19)  SpO2: 94% (06-21-22 @ 08:00) (94% - 98%)    Constitutional: A & O x 3  HEENT:   Normal oral mucosa, PERRL, EOMI	  Cardiovascular: Normal S1 S2, No JVD, No murmurs, No edema  Respiratory: Lungs clear to auscultation	  Gastrointestinal:  Soft, Non-tender, + BS	  Skin: No rashes, No ecchymoses, No cyanosis  Neurologic: Non-focal  Extremities: Normal range of motion, No clubbing, cyanosis or edema  Vascular: Peripheral pulses palpable 2+ bilaterally    TELEMETRY: RSR    ECG:  NSR 66bpm 	  RADIOLOGY:   < from: CT Brain Stroke Protocol (06.20.22 @ 14:41) >  ACC: [id number] EXAM:  CT BRAIN STROKE PROTOCOL                          PROCEDURE DATE:  06/20/2022          INTERPRETATION:  CLINICAL INDICATION: Prior CVAs with new CVA.    TECHNIQUE: CT of the head was performed without the administration of   intravenous contrast.    COMPARISON: CT head 6/17/2022.    FINDINGS:  Loss of gray-white matter differentiation is noted involving the left   lateral sensorimotor cortex (3-32).    Chronic infarcts are again seen involving the right cerebellum, bilateral   medial thalami, and left frontal centrum semiovale.    No acute intracranial hemorrhage is identified.    No hydrocephalus. No extra-axial fluid collections.    The visualized intraorbital contents are unremarkable. The imaged   portions of the paranasal sinuses are clear. The mastoid air cells are   clear.    IMPRESSION:    -Interval development of infarct in the left lateral sensorimotor cortex.  -No acute intracranial hemorrhage.  -Multiple chronic infarcts as detailed above.    Findings discussed with MANPREET Basurto at 2:57 PM 6/20/2022.        DIAGNOSTIC TESTING:  [ x] Echocardiogram:  < from: TTE Echo Complete w/o Contrast w/ Doppler (06.20.22 @ 11:46) >  ACC: [id number] EXAM:  ECHO TTE WO CON COMP W DOPP                          PROCEDURE DATE:  06/20/2022          INTERPRETATION:  TRANSTHORACIC ECHOCARDIOGRAM REPORT        Patient Name:   SELVIN CUEVAS Patient Location: Inpatient  Medical Rec #:  [id number]      Accession #:      [id number]  Account #:                      Height:           63.0 in 160.0 cm  YOB: 1990       Weight:           149.9 lb 68.00 kg  Patient Age:    32 years        BSA:              1.71 m²  Patient Gender: M               BP:               132/108 mmHg      Date of Exam:        6/20/2022 11:46:49 AM  Sonographer:         Manju Garcia  Referring Physician: Michelle Cai    Procedure:   2D Echo/Doppler/Color Doppler Complete, Echocardiogram with             Definity Contrast and Saline Contrast (Bubble Study).  Indications: Cardiomyopathy, unspecified - I42.9  Diagnosis:   Cardiomyopathy, unspecified - I42.9        2D AND M-MODE MEASUREMENTS (normal ranges within parentheses):  Left      Normal   Aorta/Left            Normal  Ventricle:                    Atrium:  IVSd (2D):    1.10  (0.7-1.1) Aortic Root    3.20  (2.4-3.7)                 cm             (Mmode):        cm  LVPWd (2D):   1.11  (0.7-1.1) Left Atrium    3.40  (1.9-4.0)                 cm             (2D):           cm  LVIDd (2D):   5.42  (3.4-5.7) LA Volume      13.8                 cm             Index         ml/m²  LVIDs (2D):   4.42                 cm  LV FS (2D):   18.5   (>25%)                  %  Relative Wall 0.41   (<0.42)  Thickness    LV DIASTOLIC FUNCTION:  MV Peak E: 0.38 m/s Decel Time: 183 msec  MV Peak A: 0.64 m/s  E/A Ratio: 0.59    SPECTRAL DOPPLER ANALYSIS (where applicable):  Mitral Valve:  MV P1/2 Time: 53.07 msec  MV Area, PHT: 4.15cm²    LVOT Vmax:  LVOT VTI:  LVOT Diameter: 2.20 cm    Tricuspid Valve and PA/RV Systolic Pressure: TR Max Velocity: 2.23 m/s RA   Pressure: 3 mmHg RVSP/PASP: 22.9 mmHg      PHYSICIAN INTERPRETATION:  Left Ventricle: Endocardial visualization was enhanced with intravenous   echo contrast. The left ventricular internal cavity size is normal.  Global LV systolic function was moderately decreased. Left ventricular   ejection fraction, by visual estimation, is 35 to 40%. Spectral Doppler   shows impaired relaxation pattern of left ventricular myocardial filling   (Grade I diastolic dysfunction).  Right Ventricle: The right ventricular size is normal. RV systolic   function is low normal.  Left Atrium: The left atrium is normal in size. Color flow doppler and   intravenous injection of agitated saline demonstrates the presence of an   intact intra atrial septum.  Right Atrium: The right atrium is normal in size.  Pericardium: There is no evidence of pericardial effusion.  Mitral Valve: Mild thickening of the anterior and posterior mitral valve   leaflets. Trace mitral valve regurgitation is seen.  Tricuspid Valve: The tricuspid valve is normal in structure. Mild   tricuspid regurgitation is visualized.  Aortic Valve: The aortic valve is trileaflet. No evidence of aortic valve   regurgitation is seen.  Pulmonic Valve: Structurally normal pulmonic valve, with normal leaflet   excursion. Trace pulmonic valve regurgitation.  Aorta: The aortic root is normal in size and structure.  Pulmonary Artery: The main pulmonary artery is normal in size.  Venous: The inferior vena cava was normal sized, with respiratory size   variation greater than 50%.  Shunts: Agitated saline contrast was given intravenously to evaluate for   intracardiac shunting.  In comparison to the previous echocardiogram(s): Prior examinations are   available and were reviewed for comparison purposes. Compared to prior   study dated 12/2017, no significant interval changes have occurred.      Summary:   1. Endocardial visualization was enhanced with intravenous echo contrast.   2. Left ventricular ejection fraction, by visual estimation, is 35 to   40%.   3. Moderately decreased global left ventricular systolic function.   4. Spectral Doppler shows impaired relaxation pattern of left   ventricular myocardial filling (Grade I diastolic dysfunction).   5. There is no evidence of pericardial effusion.   6. Mild thickening of the anterior and posterior mitral valve leaflets.   7. Trace mitral valve regurgitation.   8. Mild tricuspid regurgitation.   9. Color flow doppler and intravenous injection of agitated saline   demonstrates the presence of an intact intra atrial septum.  10. Compared to prior study dated 12/2017, no significant interval         LABS:	 	    CARDIAC MARKERS:                            16.1   7.22  )-----------( 196      ( 21 Jun 2022 06:46 )             49.4     06-21    139  |  101  |  14.4  ----------------------------<  90  4.1   |  27.0  |  0.94    Ca    8.9      21 Jun 2022 06:46

## 2022-06-21 NOTE — PROGRESS NOTE ADULT - SUBJECTIVE AND OBJECTIVE BOX
SELVIN CUEVAS    [id number]    32y      Male    INTERVAL HPI/OVERNIGHT EVENTS: patient seen post aborted MEY. Patient complains of chronic right facial pain.     REVIEW OF SYSTEMS:    CONSTITUTIONAL: No fever, weight loss, or fatigue  RESPIRATORY: No cough, wheezing, hemoptysis; No shortness of breath  CARDIOVASCULAR: No chest pain, palpitations  GASTROINTESTINAL: No abdominal or epigastric pain. No nausea, vomiting  NEUROLOGICAL: No headaches, memory loss, loss of strength.  MISCELLANEOUS:      Vital Signs Last 24 Hrs  T(C): 36.4 (21 Jun 2022 12:30), Max: 36.7 (20 Jun 2022 16:15)  T(F): 97.5 (21 Jun 2022 12:30), Max: 98 (20 Jun 2022 16:15)  HR: 74 (21 Jun 2022 14:00) (58 - 87)  BP: 120/90 (21 Jun 2022 14:00) (113/88 - 150/102)  BP(mean): 105 (21 Jun 2022 08:00) (105 - 111)  RR: 18 (21 Jun 2022 14:00) (16 - 19)  SpO2: 98% (21 Jun 2022 14:00) (94% - 99%)    PHYSICAL EXAM:    CONSTITUTIONAL: Non toxic appearing, lying in bed, speaking in full sentences  ENMT: Moist oral mucosa, slight facial droop   RESPIRATORY: Normal respiratory effort; lungs are clear to auscultation bilaterally  CARDIOVASCULAR: Tachycardic Regular rhythm, normal S1 and S2, no murmur/rub/gallop; No lower extremity edema; Peripheral pulses are 2+ bilaterally  ABDOMEN: Nontender to palpation, normoactive bowel sounds, no rebound/guarding; No hepatosplenomegaly  MUSCLOSKELETAL:  Normal gait; no clubbing or cyanosis of digits; no joint swelling or tenderness to palpation  PSYCH: A+O to person, place, and time; affect appropriate  NEUROLOGY: Rt facial dysesthesia Chronic asymmetry of facial muscles, dysarthria   SKIN: No rashes; no palpable lesions      LABS:                        16.1   7.22  )-----------( 196      ( 21 Jun 2022 06:46 )             49.4     06-21    139  |  101  |  14.4  ----------------------------<  90  4.1   |  27.0  |  0.94    Ca    8.9      21 Jun 2022 06:46              MEDICATIONS  (STANDING):  apixaban 5 milliGRAM(s) Oral two times a day  aspirin  chewable 81 milliGRAM(s) Oral daily  atorvastatin 40 milliGRAM(s) Oral at bedtime  carvedilol 12.5 milliGRAM(s) Oral every 12 hours    MEDICATIONS  (PRN):  acetaminophen     Tablet .. 650 milliGRAM(s) Oral every 6 hours PRN Temp greater or equal to 38C (100.4F), Mild Pain (1 - 3)  melatonin 3 milliGRAM(s) Oral once PRN Sleep  traMADol 25 milliGRAM(s) Oral every 6 hours PRN Moderate Pain (4 - 6)      RADIOLOGY & ADDITIONAL TESTS:

## 2022-06-21 NOTE — PROGRESS NOTE ADULT - ASSESSMENT
31 yo male with PMH of afib on Eliquis, NICM, s/p ICD, ASD s/p repair, prior CVA was brought to the ED for slurry speech, and bilateral hand numbness and facial droop.     CVA   change to q4h  CT with multiple prior infarctions,   (ICD non compatible with MRI).   yaritza aborted, unable to pass as per cardio  -change eliquis?    Now with Rt sided sensory changes  Unable to get MRI due to cardiac hardware (per report)     Afib. Paroxysmal    Carvedilol bid  cardio following  Continue Eliquis for now    NICM with decreased EF and ICD  euvolemic  TTE appreciated      DVT ppx : Eliquis   ambulate with assistance  downgrade to monitored bed, likely dc tomorrow?

## 2022-06-21 NOTE — CHART NOTE - NSCHARTNOTEFT_GEN_A_CORE
Patient came to MEY lab.    MEY attempted with Adult probe and Pediatric probe.   Unable to pass the probe.    Procedure was aborted.
Called by radiologist with CT Head showing interval development of infarct in the left lateral sensorimotor cortex.  Patient seen and examined.  No complaints.    Discussed findings with Dr. Lim, will discuss with neurology and cardiology.  Coreg added. Will monitor neuro checks.

## 2022-06-21 NOTE — PROGRESS NOTE ADULT - ASSESSMENT
32y Male with atrial fibrillation(on Eliquis), NICM, AICD, ASD repair 2014, and prior strokes s/p ASD closure repair presented with slurred speech and right arm and face numbness and weakness.  Given compliance with NOAC, wasn't a candidate for Tpa. CT head, angio H & N negative.  However, repeat CT brain 6/20/2022 -Interval development of infarct in the left lateral sensorimotor cortex.     ASA and Mallampati: Per Anesthesia  -MEY as ordered, check integrity of ASD closure device, rule out leak  -may need to change NOAC   -Procedure discussed with patient; risks and benefits explained; questions answered  -Labs and ECG reviewed  32y Male with atrial fibrillation(on Eliquis), NICM, AICD, ASD repair 2014, and prior strokes s/p ASD closure repair 2013 presented with slurred speech and right arm and face numbness and weakness.  Given compliance with NOAC, wasn't a candidate for Tpa. CT head, angio H & N negative.  However, repeat CT brain 6/20/2022 -Interval development of infarct in the left lateral sensorimotor cortex.     ASA and Mallampati: Per Anesthesia  -MEY as ordered, check integrity of ASD closure device, rule out leak  -may need to change NOAC   -Procedure discussed with patient; risks and benefits explained; questions answered  -Labs and ECG reviewed  32y Male with atrial fibrillation(on Eliquis), NICM, AICD, ASD repair 2014, and prior strokes s/p ASD closure repair 2013 presented with slurred speech and right arm and face numbness and weakness.  Given compliance with NOAC, wasn't a candidate for Tpa. CT head, angio H & N negative.  However, repeat CT brain 6/20/2022 -Interval development of infarct in the left lateral sensorimotor cortex.     ASA and Mallampati: Per Anesthesia  -MEY as ordered, check integrity of ASD closure device, rule out leak  -may need to change NOAC   -Procedure discussed with patient; risks and benefits explained; questions answered  -Labs and ECG reviewed    POST MEY ANDENDUM    **FAILED MEY unable to pass probe   -Post MEY orders  -BTB once criteria met with julio cesar score<8  -follow a soft, bland diet for the next 4 hours, avoiding hot, spicy, crunchy foods  further mgmt per Dr. Pino and Primary team

## 2022-06-22 ENCOUNTER — TRANSCRIPTION ENCOUNTER (OUTPATIENT)
Age: 32
End: 2022-06-22

## 2022-06-22 VITALS — WEIGHT: 167.11 LBS

## 2022-06-22 LAB
ALBUMIN SERPL ELPH-MCNC: 3.9 G/DL — SIGNIFICANT CHANGE UP (ref 3.3–5.2)
ALP SERPL-CCNC: 38 U/L — LOW (ref 40–120)
ALT FLD-CCNC: 14 U/L — SIGNIFICANT CHANGE UP
ANION GAP SERPL CALC-SCNC: 11 MMOL/L — SIGNIFICANT CHANGE UP (ref 5–17)
AST SERPL-CCNC: 15 U/L — SIGNIFICANT CHANGE UP
BASOPHILS # BLD AUTO: 0.01 K/UL — SIGNIFICANT CHANGE UP (ref 0–0.2)
BASOPHILS NFR BLD AUTO: 0.1 % — SIGNIFICANT CHANGE UP (ref 0–2)
BILIRUB SERPL-MCNC: 0.6 MG/DL — SIGNIFICANT CHANGE UP (ref 0.4–2)
BUN SERPL-MCNC: 16.7 MG/DL — SIGNIFICANT CHANGE UP (ref 8–20)
CALCIUM SERPL-MCNC: 8.8 MG/DL — SIGNIFICANT CHANGE UP (ref 8.6–10.2)
CHLORIDE SERPL-SCNC: 100 MMOL/L — SIGNIFICANT CHANGE UP (ref 98–107)
CO2 SERPL-SCNC: 24 MMOL/L — SIGNIFICANT CHANGE UP (ref 22–29)
CREAT SERPL-MCNC: 0.77 MG/DL — SIGNIFICANT CHANGE UP (ref 0.5–1.3)
EGFR: 121 ML/MIN/1.73M2 — SIGNIFICANT CHANGE UP
EOSINOPHIL # BLD AUTO: 0 K/UL — SIGNIFICANT CHANGE UP (ref 0–0.5)
EOSINOPHIL NFR BLD AUTO: 0 % — SIGNIFICANT CHANGE UP (ref 0–6)
GLUCOSE SERPL-MCNC: 129 MG/DL — HIGH (ref 70–99)
HCT VFR BLD CALC: 47.5 % — SIGNIFICANT CHANGE UP (ref 39–50)
HGB BLD-MCNC: 15.3 G/DL — SIGNIFICANT CHANGE UP (ref 13–17)
IMM GRANULOCYTES NFR BLD AUTO: 0.8 % — SIGNIFICANT CHANGE UP (ref 0–1.5)
LYMPHOCYTES # BLD AUTO: 0.97 K/UL — LOW (ref 1–3.3)
LYMPHOCYTES # BLD AUTO: 7.5 % — LOW (ref 13–44)
MAGNESIUM SERPL-MCNC: 1.9 MG/DL — SIGNIFICANT CHANGE UP (ref 1.6–2.6)
MCHC RBC-ENTMCNC: 27.7 PG — SIGNIFICANT CHANGE UP (ref 27–34)
MCHC RBC-ENTMCNC: 32.2 GM/DL — SIGNIFICANT CHANGE UP (ref 32–36)
MCV RBC AUTO: 85.9 FL — SIGNIFICANT CHANGE UP (ref 80–100)
MONOCYTES # BLD AUTO: 0.56 K/UL — SIGNIFICANT CHANGE UP (ref 0–0.9)
MONOCYTES NFR BLD AUTO: 4.3 % — SIGNIFICANT CHANGE UP (ref 2–14)
NEUTROPHILS # BLD AUTO: 11.36 K/UL — HIGH (ref 1.8–7.4)
NEUTROPHILS NFR BLD AUTO: 87.3 % — HIGH (ref 43–77)
PLATELET # BLD AUTO: 195 K/UL — SIGNIFICANT CHANGE UP (ref 150–400)
POTASSIUM SERPL-MCNC: 4.2 MMOL/L — SIGNIFICANT CHANGE UP (ref 3.5–5.3)
POTASSIUM SERPL-SCNC: 4.2 MMOL/L — SIGNIFICANT CHANGE UP (ref 3.5–5.3)
PROT SERPL-MCNC: 7 G/DL — SIGNIFICANT CHANGE UP (ref 6.6–8.7)
RBC # BLD: 5.53 M/UL — SIGNIFICANT CHANGE UP (ref 4.2–5.8)
RBC # FLD: 13.2 % — SIGNIFICANT CHANGE UP (ref 10.3–14.5)
SODIUM SERPL-SCNC: 135 MMOL/L — SIGNIFICANT CHANGE UP (ref 135–145)
TSH SERPL-MCNC: 0.49 UIU/ML — SIGNIFICANT CHANGE UP (ref 0.27–4.2)
WBC # BLD: 13.01 K/UL — HIGH (ref 3.8–10.5)
WBC # FLD AUTO: 13.01 K/UL — HIGH (ref 3.8–10.5)

## 2022-06-22 PROCEDURE — 99239 HOSP IP/OBS DSCHRG MGMT >30: CPT | Mod: 1L

## 2022-06-22 PROCEDURE — 99232 SBSQ HOSP IP/OBS MODERATE 35: CPT

## 2022-06-22 RX ORDER — CARVEDILOL PHOSPHATE 80 MG/1
0 CAPSULE, EXTENDED RELEASE ORAL
Qty: 0 | Refills: 3 | DISCHARGE

## 2022-06-22 RX ORDER — APIXABAN 2.5 MG/1
0 TABLET, FILM COATED ORAL
Qty: 0 | Refills: 2 | DISCHARGE

## 2022-06-22 RX ORDER — ATORVASTATIN CALCIUM 80 MG/1
1 TABLET, FILM COATED ORAL
Qty: 30 | Refills: 0
Start: 2022-06-22 | End: 2022-07-21

## 2022-06-22 RX ORDER — ASPIRIN/CALCIUM CARB/MAGNESIUM 324 MG
0 TABLET ORAL
Qty: 0 | Refills: 0 | DISCHARGE

## 2022-06-22 RX ORDER — SACUBITRIL AND VALSARTAN 24; 26 MG/1; MG/1
1 TABLET, FILM COATED ORAL
Qty: 60 | Refills: 0
Start: 2022-06-22 | End: 2022-07-21

## 2022-06-22 RX ORDER — RIVAROXABAN 15 MG-20MG
1 KIT ORAL
Qty: 30 | Refills: 0
Start: 2022-06-22 | End: 2022-07-21

## 2022-06-22 RX ORDER — RIVAROXABAN 15 MG-20MG
20 KIT ORAL
Refills: 0 | Status: DISCONTINUED | OUTPATIENT
Start: 2022-06-22 | End: 2022-06-22

## 2022-06-22 RX ORDER — CARVEDILOL PHOSPHATE 80 MG/1
1 CAPSULE, EXTENDED RELEASE ORAL
Qty: 60 | Refills: 0
Start: 2022-06-22 | End: 2022-07-21

## 2022-06-22 RX ORDER — SACUBITRIL AND VALSARTAN 24; 26 MG/1; MG/1
0 TABLET, FILM COATED ORAL
Qty: 0 | Refills: 2 | DISCHARGE

## 2022-06-22 RX ORDER — ASPIRIN/CALCIUM CARB/MAGNESIUM 324 MG
1 TABLET ORAL
Qty: 0 | Refills: 0 | DISCHARGE

## 2022-06-22 RX ORDER — ATORVASTATIN CALCIUM 80 MG/1
1 TABLET, FILM COATED ORAL
Qty: 0 | Refills: 0 | DISCHARGE
Start: 2022-06-22

## 2022-06-22 RX ADMIN — CARVEDILOL PHOSPHATE 12.5 MILLIGRAM(S): 80 CAPSULE, EXTENDED RELEASE ORAL at 05:51

## 2022-06-22 RX ADMIN — APIXABAN 5 MILLIGRAM(S): 2.5 TABLET, FILM COATED ORAL at 05:50

## 2022-06-22 RX ADMIN — Medication 81 MILLIGRAM(S): at 11:13

## 2022-06-22 NOTE — PROGRESS NOTE ADULT - PROVIDER SPECIALTY LIST ADULT
Cardiology
Internal Medicine
Cardiology
Hospitalist
Internal Medicine
Internal Medicine
Neurology
Neurology

## 2022-06-22 NOTE — DISCHARGE NOTE NURSING/CASE MANAGEMENT/SOCIAL WORK - PATIENT PORTAL LINK FT
You can access the FollowMyHealth Patient Portal offered by [facility] by registering at the following website: [email or web address]. By joining [employer]’s FollowMyHealth portal, you will also be able to view your health information using other applications (apps) compatible with our system.

## 2022-06-22 NOTE — DISCHARGE NOTE PROVIDER - CARE PROVIDERS DIRECT ADDRESSES
[email or web address]

## 2022-06-22 NOTE — DISCHARGE NOTE PROVIDER - CARE PROVIDER_API CALL
Jesus Pino (MD)  Cardiovascular Disease  [address], [address]  [place] [address]  Phone: [phone]  Fax: [phone]  Follow Up Time:     primary care,   pcp  Phone: (   )    -  Fax: (   )    -  Follow Up Time:     Jean Saenz; PhD)  Neurology; Vascular Neurology  [address], [address]  [place] [address]  Phone: [phone]  Fax: [phone]  Follow Up Time:

## 2022-06-22 NOTE — DISCHARGE NOTE PROVIDER - NSDCCPCAREPLAN_GEN_ALL_CORE_FT
PRINCIPAL DISCHARGE DIAGNOSIS  Diagnosis: Stroke  Assessment and Plan of Treatment:       SECONDARY DISCHARGE DIAGNOSES  Diagnosis: PAF (paroxysmal atrial fibrillation)  Assessment and Plan of Treatment:     Diagnosis: Chronic systolic congestive heart failure  Assessment and Plan of Treatment:      PRINCIPAL DISCHARGE DIAGNOSIS  Diagnosis: Stroke  Assessment and Plan of Treatment: Continue current medications as prescribed.  Follow up with primary doctor, cardiology and neurology.      SECONDARY DISCHARGE DIAGNOSES  Diagnosis: PAF (paroxysmal atrial fibrillation)  Assessment and Plan of Treatment: Continue current medications as prescribed.  Follow up with primary doctor and cardiology.  Eliquis changed to Xarelto.    Diagnosis: Chronic systolic congestive heart failure  Assessment and Plan of Treatment: Continue current medications as prescribed.  Coreg and Entresto doses lowered.  Follow up with primary doctor and cardiology.

## 2022-06-22 NOTE — PROGRESS NOTE ADULT - ASSESSMENT
32y Male with atrial fibrillation, NICM, AICD, ASD repair 2014, and prior strokes   presented with slurred speech and right arm and face numbness and weakness.    Patient states he has compliance with the Eliquis and was therefore not a t-PA candidate.   CT head, angio H & N negative.    Patient follows with MD Pino.     Last visit with MD Pino was Dec 9, 2021.  His last echo from 2017 EF 55%, which had improved last spring which was 45%.    6/20/22 Hemodynamically stable   Problem/Plan -   ·  Problem: CVA (cerebrovascular accident).   · AICD interrogation to evaluate PAF episode/ , last afib episode was 5/27/22 per verbal report.  ICD not compatible for MRI / Repeat CT  *results noted with new infarcts/ will schedule for MEY     MEY 6/21 : unable to pass probe   continue ASA/  statin atorvastatin 40 daily / coreg  Will changed Eliquis to Xeralto 20mg daily    Problem/Plan -   ·  Problem: PAF (paroxysmal atrial fibrillation).   Rate controlled on AC now with xeralto  / Currently in SR    HFrEF -NICM 35-40%   -ICD   Euvolemic   resume  Entresto priot to d/c   d/w /Alesha Pino / Medicine NP   follow up Dr. Pino

## 2022-06-22 NOTE — PROGRESS NOTE ADULT - TIME BILLING
Pt was seen and examined. Plan of care dw np.   Pt is neurologically improved. Unable to do MEY.   Pt with afib, states that he is compliant with meds including eliquis.  he is on antiplatelet  therapy.  Pt with NICM, HF meds resumed now  pt will start xarelto now. FU in office in 2-3 weeks.   FU with neurology.  Thank you.
as above

## 2022-06-22 NOTE — DISCHARGE NOTE PROVIDER - NSDCMRMEDTOKEN_GEN_ALL_CORE_FT
aspirin 81 mg oral delayed release capsule:   atorvastatin 40 mg oral tablet: 1 tab(s) orally once a day (at bedtime)  CARVEDILOL 25 MG TABLET: take 1 tablet by mouth twice a day  Entresto 24 mg-26 mg oral tablet: 1 tab(s) orally 2 times a day    aspirin 81 mg oral delayed release capsule: 1 cap(s) orally once a day  atorvastatin 40 mg oral tablet: 1 tab(s) orally once a day (at bedtime)  carvedilol 12.5 mg oral tablet: 1 tab(s) orally every 12 hours  Entresto 24 mg-26 mg oral tablet: 1 tab(s) orally 2 times a day   Xarelto 20 mg oral tablet: 1 tab(s) orally once a day (in the evening)

## 2022-06-22 NOTE — DISCHARGE NOTE PROVIDER - PROVIDER TOKENS
PROVIDER:[TOKEN:[[id number]]],FREE:[LAST:[primary care],PHONE:[(   )    -],FAX:[(   )    -],ADDRESS:[pcp]],PROVIDER:[TOKEN:[[id number]]]

## 2022-06-22 NOTE — PROVIDER CONTACT NOTE (MEDICATION) - SITUATION
Pt is being discharge and want to go home, ordered discharge not activate, clarified to MD can activate anytime when pt is ready.

## 2022-06-22 NOTE — DISCHARGE NOTE PROVIDER - HOSPITAL COURSE
33 yo male with PMH of afib on Eliquis, NICM, s/p ICD, ASD s/p repair, prior CVA was brought to the ED for slurry speech, and bilateral hand numbness and facial droop. Patient admitted to medicine and seen by cardio and neuro in consult. Patient had ct head -     IMPRESSION:    -Interval development of infarct in the left lateral sensorimotor cortex.  -No acute intracranial hemorrhage.  -Multiple chronic infarcts as detailed above.    CVA   statin, asa and DOAC  CT with multiple prior infarctions,   (ICD non compatible with MRI).   yaritza aborted, unable to pass as per cardio    Now with Rt sided sensory changes  Unable to get MRI due to cardiac hardware (per report)   stable     Afib. Paroxysmal    Carvedilol bid  cardio following  Continue Eliquis for now    NICM with decreased EF and ICD  euvolemic  TTE appreciated  entreso decreased due to slight hypotension        33 yo male with PMH of afib on Eliquis, NICM, s/p ICD, ASD s/p repair, prior CVA was brought to the ED for slurry speech, and bilateral hand numbness and facial droop. Patient admitted to medicine and seen by cardio and neuro in consult. Patient had ct head -     IMPRESSION:    -Interval development of infarct in the left lateral sensorimotor cortex.  -No acute intracranial hemorrhage.  -Multiple chronic infarcts as detailed above.    CVA   statin, asa and DOAC  CT with multiple prior infarctions,   (ICD non compatible with MRI).   yaritza aborted, unable to pass as per cardio    Now with Rt sided sensory changes  Unable to get MRI due to cardiac hardware (per report)   stable     Afib. Paroxysmal    Carvedilol bid  cardio following  Eliquis changed to Xarelto    NICM with decreased EF and ICD  euvolemic  TTE appreciated  entreso decreased due to slight hypotension

## 2022-06-22 NOTE — DISCHARGE NOTE PROVIDER - ATTENDING DISCHARGE PHYSICAL EXAMINATION:
CONSTITUTIONAL: Non toxic appearing, lying in bed, speaking in full sentences  ENMT: Moist oral mucosa, slight facial droop   RESPIRATORY: Normal respiratory effort; lungs are clear to auscultation bilaterally  CARDIOVASCULAR: Tachycardic Regular rhythm, normal S1 and S2, no murmur/rub/gallop; No lower extremity edema; Peripheral pulses are 2+ bilaterally  ABDOMEN: Nontender to palpation, normoactive bowel sounds, no rebound/guarding; No hepatosplenomegaly  MUSCLOSKELETAL:  Normal gait; no clubbing or cyanosis of digits; no joint swelling or tenderness to palpation  PSYCH: A+O to person, place, and time; affect appropriate  NEUROLOGY: Rt facial dysesthesia Chronic asymmetry of facial muscles, dysarthria   SKIN: No rashes; no palpable lesions

## 2022-06-22 NOTE — PROGRESS NOTE ADULT - SUBJECTIVE AND OBJECTIVE BOX
[facility]                                                         CARDIOLOGY AT [facility] ([facility]3]                                                                  [address] [facility] [facility] [place] [place] [address]                                                         Telephone: [phone]. Fax:[phone]                                                                             PROGRESS NOTE    Reason for follow up: CVA  Update: "feels good , still with some Right sided numbness of the face   denies complaints of chest pain/sob/dizziness/palps       Review of symptoms:   Cardiac:  No chest pain. No dyspnea. No palpitations.  Respiratory: no cough. No dyspnea  Gastrointestinal: No diarrhea. No abdominal pain. No bleeding.   Neuro: No focal neuro complaints.    Vitals:  T(C): 36.6 (06-22-22 @ 02:37), Max: 36.8 (06-21-22 @ 16:43)  HR: 78 (06-22-22 @ 02:37) (58 - 102)  BP: 108/75 (06-22-22 @ 02:37) (108/75 - 157/90)  RR: 18 (06-22-22 @ 02:37) (17 - 18)  SpO2: 95% (06-22-22 @ 02:37) (93% - 99%)  Wt(kg): --  I&O's Summary    21 Jun 2022 07:01  -  22 Jun 2022 07:00  --------------------------------------------------------  IN: 0 mL / OUT: 600 mL / NET: -600 mL      Weight (kg): 75.75 (06-18 @ 11:19)    PHYSICAL EXAM:  Appearance: Comfortable. No acute distress  HEENT:  Atraumatic. Normocephalic.  Normal oral mucosa  Neurologic: A & O x 3, no gross focal deficits.  Cardiovascular: RRR S1 S2,RRR 60 No murmur, no rubs/gallops. No JVD  Respiratory: Lungs clear to auscultation, unlabored   Gastrointestinal:  Soft, Non-tender, + BS  Lower Extremities: 2+ Peripheral Pulses, No clubbing, cyanosis, or edema  Psychiatry: Patient is calm. No agitation.   Skin: warm and dry.    CURRENT CARDIAC MEDICATIONS:  carvedilol 12.5 milliGRAM(s) Oral every 12 hours      CURRENT OTHER MEDICATIONS:  acetaminophen     Tablet .. 650 milliGRAM(s) Oral every 6 hours PRN Temp greater or equal to 38C (100.4F), Mild Pain (1 - 3)  melatonin 3 milliGRAM(s) Oral once PRN Sleep  traMADol 25 milliGRAM(s) Oral every 6 hours PRN Moderate Pain (4 - 6)  atorvastatin 40 milliGRAM(s) Oral at bedtime  apixaban 5 milliGRAM(s) Oral two times a day  aspirin  chewable 81 milliGRAM(s) Oral daily      LABS:	 	  ( 17 Jun 2022 18:20 )  Troponin T  <0.01,  CPK  X    , CKMB  X    , BNP X                                  15.3   13.01 )-----------( 195      ( 22 Jun 2022 06:59 )             47.5     06-22    135  |  100  |  16.7  ----------------------------<  129<H>  4.2   |  24.0  |  0.77    Ca    8.8      22 Jun 2022 06:59  Mg     1.9     06-22    TPro  7.0  /  Alb  3.9  /  TBili  0.6  /  DBili  x   /  AST  15  /  ALT  14  /  AlkPhos  38<L>  06-22    PT/INR/PTT ( 17 Jun 2022 18:20 )                       :                       :      12.0         :       34.5                  .        .                   .              .           .       1.03        .                                       Lipid Profile: Date: 06-20 @ 07:36  Total cholesterol 179; Direct LDL: --; HDL: 71; Triglycerides:58  Date: 06-19 @ 09:16  Total cholesterol 204; Direct LDL: --; HDL: 78; Triglycerides:43    HgA1c:   TSH: Thyroid Stimulating Hormone, Serum: 0.49 uIU/mL      TELEMETRY: RSR 60's     DIAGNOSTIC TESTING:  [ ] Echocardiogram:   < from: TTE Echo Complete w/o Contrast w/ Doppler (06.20.22 @ 11:46) >  . Endocardial visualization was enhanced with intravenous echo contrast.   2. Left ventricular ejection fraction, by visual estimation, is 35 to   40%.   3. Moderately decreased global left ventricular systolic function.   4. Spectral Doppler shows impaired relaxation pattern of left   ventricular myocardial filling (Grade I diastolic dysfunction).   5. There is no evidence of pericardial effusion.   6. Mild thickening of the anterior and posterior mitral valve leaflets.   7. Trace mitral valve regurgitation.   8. Mild tricuspid regurgitation.   9. Color flow doppler and intravenous injection of agitated saline   demonstrates the presence of an intact intra atrial septum.  10. Compared to prior study dated 12/2017, no significant interval   changes have occurred.    MEY:   	< from: MEY Echo Doppler (06.21.22 @ 11:33) >   MEY attempted with Adult probe and Pediatric probe, Unable to pass   both probes. MEY was aborted.   2. Unable to pass the MEY probe; no complications noted.    >

## 2022-07-25 ENCOUNTER — APPOINTMENT (OUTPATIENT)
Dept: CARDIOLOGY | Facility: CLINIC | Age: 32
End: 2022-07-25

## 2022-08-01 ENCOUNTER — NON-APPOINTMENT (OUTPATIENT)
Age: 32
End: 2022-08-01

## 2022-08-01 ENCOUNTER — APPOINTMENT (OUTPATIENT)
Dept: CARDIOLOGY | Facility: CLINIC | Age: 32
End: 2022-08-01

## 2022-08-01 VITALS
OXYGEN SATURATION: 99 % | WEIGHT: 152 LBS | TEMPERATURE: 98.1 F | DIASTOLIC BLOOD PRESSURE: 82 MMHG | HEART RATE: 46 BPM | HEIGHT: 62 IN | BODY MASS INDEX: 27.97 KG/M2 | SYSTOLIC BLOOD PRESSURE: 129 MMHG

## 2022-08-01 PROCEDURE — 99214 OFFICE O/P EST MOD 30 MIN: CPT | Mod: 25

## 2022-08-01 PROCEDURE — 93000 ELECTROCARDIOGRAM COMPLETE: CPT

## 2022-08-01 RX ORDER — SACUBITRIL AND VALSARTAN 97; 103 MG/1; MG/1
97-103 TABLET, FILM COATED ORAL TWICE DAILY
Qty: 180 | Refills: 1 | Status: DISCONTINUED | COMMUNITY
Start: 2017-12-19 | End: 2022-08-01

## 2022-08-01 RX ORDER — CARVEDILOL 25 MG/1
25 TABLET, FILM COATED ORAL TWICE DAILY
Qty: 180 | Refills: 3 | Status: DISCONTINUED | COMMUNITY
Start: 2021-01-20 | End: 2022-08-01

## 2022-08-01 RX ORDER — APIXABAN 5 MG/1
5 TABLET, FILM COATED ORAL
Qty: 60 | Refills: 1 | Status: DISCONTINUED | COMMUNITY
Start: 2021-04-16 | End: 2022-08-01
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
## 2022-08-11 PROCEDURE — 84484 ASSAY OF TROPONIN QUANT: CPT

## 2022-08-11 PROCEDURE — 83735 ASSAY OF MAGNESIUM: CPT

## 2022-08-11 PROCEDURE — 82962 GLUCOSE BLOOD TEST: CPT

## 2022-08-11 PROCEDURE — U0003: CPT

## 2022-08-11 PROCEDURE — 99285 EMERGENCY DEPT VISIT HI MDM: CPT | Mod: 25

## 2022-08-11 PROCEDURE — 86901 BLOOD TYPING SEROLOGIC RH(D): CPT

## 2022-08-11 PROCEDURE — 97167 OT EVAL HIGH COMPLEX 60 MIN: CPT

## 2022-08-11 PROCEDURE — 84100 ASSAY OF PHOSPHORUS: CPT

## 2022-08-11 PROCEDURE — 83036 HEMOGLOBIN GLYCOSYLATED A1C: CPT

## 2022-08-11 PROCEDURE — 84443 ASSAY THYROID STIM HORMONE: CPT

## 2022-08-11 PROCEDURE — 70498 CT ANGIOGRAPHY NECK: CPT | Mod: MA

## 2022-08-11 PROCEDURE — 93005 ELECTROCARDIOGRAM TRACING: CPT

## 2022-08-11 PROCEDURE — 93312 ECHO TRANSESOPHAGEAL: CPT

## 2022-08-11 PROCEDURE — 80053 COMPREHEN METABOLIC PANEL: CPT

## 2022-08-11 PROCEDURE — 92610 EVALUATE SWALLOWING FUNCTION: CPT

## 2022-08-11 PROCEDURE — 70450 CT HEAD/BRAIN W/O DYE: CPT

## 2022-08-11 PROCEDURE — 85027 COMPLETE CBC AUTOMATED: CPT

## 2022-08-11 PROCEDURE — 87637 SARSCOV2&INF A&B&RSV AMP PRB: CPT

## 2022-08-11 PROCEDURE — 0042T: CPT

## 2022-08-11 PROCEDURE — 93320 DOPPLER ECHO COMPLETE: CPT

## 2022-08-11 PROCEDURE — 85610 PROTHROMBIN TIME: CPT

## 2022-08-11 PROCEDURE — 81001 URINALYSIS AUTO W/SCOPE: CPT

## 2022-08-11 PROCEDURE — 70496 CT ANGIOGRAPHY HEAD: CPT | Mod: MA

## 2022-08-11 PROCEDURE — 80307 DRUG TEST PRSMV CHEM ANLYZR: CPT

## 2022-08-11 PROCEDURE — 93306 TTE W/DOPPLER COMPLETE: CPT

## 2022-08-11 PROCEDURE — 87086 URINE CULTURE/COLONY COUNT: CPT

## 2022-08-11 PROCEDURE — 86850 RBC ANTIBODY SCREEN: CPT

## 2022-08-11 PROCEDURE — 97163 PT EVAL HIGH COMPLEX 45 MIN: CPT

## 2022-08-11 PROCEDURE — 85730 THROMBOPLASTIN TIME PARTIAL: CPT

## 2022-08-11 PROCEDURE — 93325 DOPPLER ECHO COLOR FLOW MAPG: CPT

## 2022-08-11 PROCEDURE — 86900 BLOOD TYPING SEROLOGIC ABO: CPT

## 2022-08-11 PROCEDURE — U0005: CPT

## 2022-08-11 PROCEDURE — 85025 COMPLETE CBC W/AUTO DIFF WBC: CPT

## 2022-08-11 PROCEDURE — 80061 LIPID PANEL: CPT

## 2022-08-11 PROCEDURE — 36415 COLL VENOUS BLD VENIPUNCTURE: CPT

## 2022-08-11 PROCEDURE — 80048 BASIC METABOLIC PNL TOTAL CA: CPT

## 2022-11-03 ENCOUNTER — APPOINTMENT (OUTPATIENT)
Dept: CARDIOLOGY | Facility: CLINIC | Age: 32
End: 2022-11-03

## 2022-11-14 ENCOUNTER — EMERGENCY (EMERGENCY)
Facility: HOSPITAL | Age: 32
LOS: 1 days | Discharge: HOME | End: 2022-11-14
Attending: EMERGENCY MEDICINE
Payer: SELF-PAY

## 2022-11-14 VITALS
HEIGHT: 65 IN | HEART RATE: 78 BPM | WEIGHT: 154.98 LBS | OXYGEN SATURATION: 98 % | SYSTOLIC BLOOD PRESSURE: 155 MMHG | RESPIRATION RATE: 20 BRPM | TEMPERATURE: 98 F | DIASTOLIC BLOOD PRESSURE: 77 MMHG

## 2022-11-14 DIAGNOSIS — Z98.890 OTHER SPECIFIED POSTPROCEDURAL STATES: Chronic | ICD-10-CM

## 2022-11-14 PROCEDURE — 99284 EMERGENCY DEPT VISIT MOD MDM: CPT

## 2022-11-14 PROCEDURE — 99282 EMERGENCY DEPT VISIT SF MDM: CPT
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
## 2022-11-14 NOTE — ED PROVIDER NOTE - PATIENT PORTAL LINK FT
You can access the FollowMyHealth Patient Portal offered by [facility] by registering at the following website: [email or web address]. By joining [employer]s FollowMyHealth portal, you will also be able to view your health information using other applications (apps) compatible with our system.

## 2022-11-14 NOTE — ED PROVIDER NOTE - CLINICAL SUMMARY MEDICAL DECISION MAKING FREE TEXT BOX
AVSS, PE unremarkable; patient declined analgesics; PMD or clinic follow up recommended for reassessment. Patient is aware of signs/symptoms to return to the emergency department.
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
## 2023-01-12 ENCOUNTER — APPOINTMENT (OUTPATIENT)
Dept: CARDIOLOGY | Facility: CLINIC | Age: 33
End: 2023-01-12

## 2023-01-19 ENCOUNTER — APPOINTMENT (OUTPATIENT)
Dept: CARDIOLOGY | Facility: CLINIC | Age: 33
End: 2023-01-19
Payer: MEDICAID

## 2023-01-19 ENCOUNTER — NON-APPOINTMENT (OUTPATIENT)
Age: 33
End: 2023-01-19

## 2023-01-19 VITALS
OXYGEN SATURATION: 97 % | SYSTOLIC BLOOD PRESSURE: 154 MMHG | HEIGHT: 62 IN | DIASTOLIC BLOOD PRESSURE: 98 MMHG | BODY MASS INDEX: 30.18 KG/M2 | HEART RATE: 61 BPM | TEMPERATURE: 98.1 F | WEIGHT: 164 LBS

## 2023-01-19 VITALS — DIASTOLIC BLOOD PRESSURE: 98 MMHG | SYSTOLIC BLOOD PRESSURE: 148 MMHG

## 2023-01-19 DIAGNOSIS — I42.9 CARDIOMYOPATHY, UNSPECIFIED: ICD-10-CM

## 2023-01-19 PROCEDURE — 99215 OFFICE O/P EST HI 40 MIN: CPT | Mod: 25

## 2023-01-19 PROCEDURE — 93000 ELECTROCARDIOGRAM COMPLETE: CPT

## 2023-01-26 ENCOUNTER — APPOINTMENT (OUTPATIENT)
Dept: ELECTROPHYSIOLOGY | Facility: CLINIC | Age: 33
End: 2023-01-26
Payer: MEDICAID

## 2023-01-26 VITALS — HEIGHT: 62 IN | TEMPERATURE: 98.2 F | WEIGHT: 165 LBS | BODY MASS INDEX: 30.36 KG/M2

## 2023-01-26 DIAGNOSIS — Z95.810 PRESENCE OF AUTOMATIC (IMPLANTABLE) CARDIAC DEFIBRILLATOR: ICD-10-CM

## 2023-01-26 PROCEDURE — 93282 PRGRMG EVAL IMPLANTABLE DFB: CPT

## 2023-01-26 PROCEDURE — 93000 ELECTROCARDIOGRAM COMPLETE: CPT | Mod: 59

## 2023-01-26 PROCEDURE — 99214 OFFICE O/P EST MOD 30 MIN: CPT | Mod: 25

## 2023-01-26 NOTE — HISTORY OF PRESENT ILLNESS
[None] : The patient complains of no symptoms [de-identified] : 28 y/o M with pmhx of NICM (LVEF 20%) diagnosed in 2012 s/p single chamber ICD for primary prevention (3/2013), CVA secondary to LV hypokinesis and presence of ASD s/p percutaneous ASD closure (2014, Dr. Pierce). Follow up TTE has shown improved LV function with EF 40%. \par \par Has remained on A/c (initially Coumadin the Eliquis) for h/o CVA in the setting of LV hypokinesis. Also history of possible AF listed in chart although unclear when AF diagnosis occurred. He was in the hospital in June 2022 diagnosed with CVA. He had multiple chronic infarcts noted on imaging. Did miss 2 days of Eliquis the night before he went to the hospital. TTE revealed LVEF 35-40%, grade I DD, trace MR, mild TR and an intact intra atrial septum; MEY was recommended but unable to be completed due to difficulty passing probe. During hospitalization, Eliquis was changed to Xarelto and he was restarted on a Statin. \par \par Presents for EP f/up for routine device check. Doing well and denies CP, SOB, CISSE, dizziness, palpitations, syncope or presyncope.\par \par \par

## 2023-01-26 NOTE — DISCUSSION/SUMMARY
[AICD Function Normal] : normal AICD function [Routine Follow-up in 3-4 months] : routine follow-up in 3-4 months [Patient] : the patient [FreeTextEntry1] : 28 y/o M with pmhx of NICM (LVEF 20%) diagnosed in 2012 s/p single chamber ICD for primary prevention (3/2013), CVA secondary to LV hypokinesis and presence of ASD s/p percutaneous ASD closure (2014, Dr. Pierce). Follow up TTE has shown improved LV function with EF 40%. \par \par Has remained on A/c (initially Coumadin the Eliquis) for h/o CVA in the setting of LV hypokinesis. Also history of possible AF listed in chart although unclear when AF diagnosis occurred. He was in the hospital in June 2022 diagnosed with CVA. He had multiple chronic infarcts noted on imaging. Did miss 2 days of Eliquis the night before he went to the hospital. TTE revealed LVEF 35-40%, grade I DD, trace MR, mild TR and an intact intra atrial septum; MEY was recommended but unable to be completed due to difficulty passing probe. During hospitalization, Eliquis was changed to Xarelto and he was restarted on a Statin. \par \par Presents for EP f/up for routine device check. Doing well and denies CP, SOB, CISSE, dizziness, palpitations, syncope or presyncope.\par \par -Device interrogation with normal device function. Adequate sensing, impedance and threshold.\par  <1%.  2.2-2.7 yrs on battery life. \par - Rare runs of regular NCT @ 190bpm, c/w possible ST. NO irregular tachyarrhythmia events suggestive of PAF.\par - Remains on a/c given recurrent CVA and ?AF.  Recommend monitoring to screen for presence of AF and assess burden. Start with 2 week MCOT monitor. Consider rhythm control options if significant AF identified that could be contributing to CM\par - Can consider ILR long term, however, given that patient plans to remain on a/c - will start with noninvasive monitoring first.\par - EP f/up in 4-6 months unless sooner based on monitoring findings\par \par Discussed with Dr. Whalen\par Consuelo Ayala PAC\par \par \par

## 2023-01-26 NOTE — PROCEDURE
[No] : not [NSR] : normal sinus rhythm [ICD] : Implantable cardioverter-defibrillator [[employer] [place]] : [employer] [VVI] : VVI [Normal] : The battery status is normal. [Threshold Testing Performed] : Threshold testing was performed [Lead Imp:  ___ohms] : lead impedance was [unfilled] ohms [Sensing Amplitude ___mv] : sensing amplitude was [unfilled] mv [___V @] : [unfilled] V [___ ms] : [unfilled] ms [None] : none [Counters Reset] : the counters were reset [de-identified] : [employer] [de-identified] : [id number] [de-identified] : 3/18/13 [de-identified] :  <1% \par NO events

## 2023-01-30 NOTE — ED ADULT NURSE NOTE - IS THE PATIENT ABLE TO BE SCREENED?
Problem: Potential for Falls  Goal: Patient will remain free of falls  Description: INTERVENTIONS:  - Educate patient/family on patient safety including physical limitations  - Instruct patient to call for assistance with activity   - Consult OT/PT to assist with strengthening/mobility   - Keep Call bell within reach  - Keep bed low and locked with side rails adjusted as appropriate  - Keep care items and personal belongings within reach  - Initiate and maintain comfort rounds  - Make Fall Risk Sign visible to staff  - Offer Toileting in advance of need  - Initiate/Maintain bed alarm  - Obtain necessary fall risk management equipment:   - Apply yellow socks and bracelet for high fall risk patients  - Consider moving patient to room near nurses station  Outcome: Progressing     Problem: PAIN - ADULT  Goal: Verbalizes/displays adequate comfort level or baseline comfort level  Description: Interventions:  - Encourage patient to monitor pain and request assistance  - Assess pain using appropriate pain scale  - Administer analgesics based on type and severity of pain and evaluate response  - Implement non-pharmacological measures as appropriate and evaluate response  - Consider cultural and social influences on pain and pain management  - Notify physician/advanced practitioner if interventions unsuccessful or patient reports new pain  Outcome: Progressing     Problem: INFECTION - ADULT  Goal: Absence or prevention of progression during hospitalization  Description: INTERVENTIONS:  - Assess and monitor for signs and symptoms of infection  - Monitor lab/diagnostic results  - Monitor all insertion sites, i e  indwelling lines, tubes, and drains  - Monitor endotracheal if appropriate and nasal secretions for changes in amount and color  - [place] appropriate cooling/warming therapies per order  - Administer medications as ordered  - Instruct and encourage patient and family to use good hand hygiene technique  - Identify and instruct in appropriate isolation precautions for identified infection/condition  Outcome: Progressing     Problem: SAFETY ADULT  Goal: Patient will remain free of falls  Description: INTERVENTIONS:  - Educate patient/family on patient safety including physical limitations  - Instruct patient to call for assistance with activity   - Consult OT/PT to assist with strengthening/mobility   - Keep Call bell within reach  - Keep bed low and locked with side rails adjusted as appropriate  - Keep care items and personal belongings within reach  - Initiate and maintain comfort rounds  - Make Fall Risk Sign visible to staff  - Offer Toileting in advance of need  - Initiate/Maintain bed alarm  - Obtain necessary fall risk management equipment:   - Apply yellow socks and bracelet for high fall risk patients  - Consider moving patient to room near nurses station  Outcome: Progressing  Goal: Maintain or return to baseline ADL function  Description: INTERVENTIONS:  - Educate patient/family on patient safety including physical limitations  - Instruct patient to call for assistance with activity   - Consult OT/PT to assist with strengthening/mobility   - Keep Call bell within reach  - Keep bed low and locked with side rails adjusted as appropriate  - Keep care items and personal belongings within reach  - Initiate and maintain comfort rounds  - Make Fall Risk Sign visible to staff  - Offer Toileting in advance of need  - Initiate/Maintain bed alarm  - Obtain necessary fall risk management equipment:   - Apply yellow socks and bracelet for high fall risk patients  - Consider moving patient to room near nurses station  Outcome: Progressing     Problem: DISCHARGE PLANNING  Goal: Discharge to home or other facility with appropriate resources  Description: INTERVENTIONS:  - Identify barriers to discharge w/patient and caregiver  - Arrange for needed discharge resources and transportation as appropriate  - Identify discharge learning needs (meds, wound care, etc )  - Refer to Case Management Department for coordinating discharge planning if the patient needs post-hospital services based on physician/advanced practitioner order or complex needs related to functional status, cognitive ability, or social support system  Outcome: Progressing     Problem: Knowledge Deficit  Goal: Patient/family/caregiver demonstrates understanding of disease process, treatment plan, medications, and discharge instructions  Description: Complete learning assessment and assess knowledge base    Interventions:  - Provide teaching at level of understanding  - Provide teaching via preferred learning methods  Outcome: Progressing     Problem: RESPIRATORY - ADULT  Goal: Achieves optimal ventilation and oxygenation  Description: INTERVENTIONS:  - Assess for changes in respiratory status  - Assess for changes in mentation and behavior  - Position to facilitate oxygenation and minimize respiratory effort  - Oxygen administered by appropriate delivery if ordered  - Initiate smoking cessation education as indicated  - Encourage broncho-pulmonary hygiene including cough, deep breathe, Incentive Spirometry  - Assess the need for suctioning and aspirate as needed  - Assess and instruct to report SOB or any respiratory difficulty  - Respiratory Therapy support as indicated  Outcome: Progressing Yes

## 2023-02-04 ENCOUNTER — OFFICE (OUTPATIENT)
Dept: URBAN - METROPOLITAN AREA CLINIC 94 | Facility: CLINIC | Age: 33
Setting detail: OPHTHALMOLOGY
End: 2023-02-04
Payer: MEDICAID

## 2023-02-04 DIAGNOSIS — H01.001: ICD-10-CM

## 2023-02-04 DIAGNOSIS — H11.442: ICD-10-CM

## 2023-02-04 DIAGNOSIS — H01.004: ICD-10-CM

## 2023-02-04 PROCEDURE — 99213 OFFICE O/P EST LOW 20 MIN: CPT | Performed by: REGISTERED NURSE

## 2023-02-04 ASSESSMENT — LID EXAM ASSESSMENTS
OS_BLEPHARITIS: 3+
OD_BLEPHARITIS: 3+

## 2023-02-04 ASSESSMENT — SUPERFICIAL PUNCTATE KERATITIS (SPK)
OS_SPK: 2+
OD_SPK: 2+

## 2023-02-04 ASSESSMENT — KERATOMETRY
OD_K1POWER_DIOPTERS: 36.75
OD_AXISANGLE_DEGREES: 026
OS_K1POWER_DIOPTERS: 39.00
OS_AXISANGLE_DEGREES: 143
OS_K2POWER_DIOPTERS: 40.25
OD_K2POWER_DIOPTERS: 39.50

## 2023-02-04 ASSESSMENT — REFRACTION_AUTOREFRACTION
OS_SPHERE: +0.25
OD_SPHERE: 0.00
OD_CYLINDER: -2.00
OD_AXIS: 118
OS_CYLINDER: -0.75
OS_AXIS: 067

## 2023-02-04 ASSESSMENT — REFRACTION_MANIFEST
OD_CYLINDER: -1.50
OS_SPHERE: -0.50
OD_AXIS: 140
OS_CYLINDER: SPH
OS_VA1: 20/20
OD_VA1: 20/20
OD_SPHERE: -1.00

## 2023-02-04 ASSESSMENT — AXIALLENGTH_DERIVED
OS_AL: 25.1619
OD_AL: 26.585
OD_AL: 26.219

## 2023-02-04 ASSESSMENT — CONFRONTATIONAL VISUAL FIELD TEST (CVF)
OS_FINDINGS: FULL
OD_FINDINGS: FULL

## 2023-02-04 ASSESSMENT — VISUAL ACUITY
OS_BCVA: 20/40
OD_BCVA: 20/20

## 2023-02-04 ASSESSMENT — TONOMETRY
OS_IOP_MMHG: 15
OD_IOP_MMHG: 13

## 2023-02-04 ASSESSMENT — CORNEAL SURGICAL SCARRING: OS_SCARRING: STROMAL

## 2023-02-04 ASSESSMENT — SPHEQUIV_DERIVED
OD_SPHEQUIV: -1
OS_SPHEQUIV: -0.125
OD_SPHEQUIV: -1.75

## 2023-02-06 ENCOUNTER — APPOINTMENT (OUTPATIENT)
Dept: CARDIOLOGY | Facility: CLINIC | Age: 33
End: 2023-02-06
Payer: MEDICAID

## 2023-02-06 PROCEDURE — 93306 TTE W/DOPPLER COMPLETE: CPT

## 2023-02-10 ENCOUNTER — NON-APPOINTMENT (OUTPATIENT)
Age: 33
End: 2023-02-10

## 2023-02-10 ENCOUNTER — OFFICE (OUTPATIENT)
Dept: URBAN - METROPOLITAN AREA CLINIC 94 | Facility: CLINIC | Age: 33
Setting detail: OPHTHALMOLOGY
End: 2023-02-10
Payer: MEDICAID

## 2023-02-10 DIAGNOSIS — H11.442: ICD-10-CM

## 2023-02-10 DIAGNOSIS — H01.004: ICD-10-CM

## 2023-02-10 DIAGNOSIS — I63.50: ICD-10-CM

## 2023-02-10 DIAGNOSIS — H16.223: ICD-10-CM

## 2023-02-10 DIAGNOSIS — H01.001: ICD-10-CM

## 2023-02-10 PROCEDURE — 92285 EXTERNAL OCULAR PHOTOGRAPHY: CPT | Performed by: OPHTHALMOLOGY

## 2023-02-10 PROCEDURE — 92012 INTRM OPH EXAM EST PATIENT: CPT | Performed by: OPHTHALMOLOGY

## 2023-02-10 ASSESSMENT — AXIALLENGTH_DERIVED
OS_AL: 25.1619
OD_AL: 26.585
OD_AL: 26.219

## 2023-02-10 ASSESSMENT — TONOMETRY: OD_IOP_MMHG: 17

## 2023-02-10 ASSESSMENT — REFRACTION_AUTOREFRACTION
OS_CYLINDER: -0.75
OS_AXIS: 067
OD_CYLINDER: -2.00
OD_SPHERE: 0.00
OD_AXIS: 118
OS_SPHERE: +0.25

## 2023-02-10 ASSESSMENT — VISUAL ACUITY
OD_BCVA: 20/20
OS_BCVA: 20/30

## 2023-02-10 ASSESSMENT — REFRACTION_MANIFEST
OD_SPHERE: -1.00
OD_AXIS: 140
OS_CYLINDER: SPH
OD_VA1: 20/20
OD_CYLINDER: -1.50
OS_SPHERE: -0.50
OS_VA1: 20/20

## 2023-02-10 ASSESSMENT — KERATOMETRY
OD_K2POWER_DIOPTERS: 39.50
OS_K1POWER_DIOPTERS: 39.00
OD_K1POWER_DIOPTERS: 36.75
OS_K2POWER_DIOPTERS: 40.25
OD_AXISANGLE_DEGREES: 026
OS_AXISANGLE_DEGREES: 143

## 2023-02-10 ASSESSMENT — SPHEQUIV_DERIVED
OD_SPHEQUIV: -1.75
OS_SPHEQUIV: -0.125
OD_SPHEQUIV: -1

## 2023-02-10 ASSESSMENT — LID EXAM ASSESSMENTS
OD_BLEPHARITIS: 3+
OS_BLEPHARITIS: 3+

## 2023-02-10 ASSESSMENT — SUPERFICIAL PUNCTATE KERATITIS (SPK)
OD_SPK: 2+
OS_SPK: 2+

## 2023-02-10 ASSESSMENT — CONFRONTATIONAL VISUAL FIELD TEST (CVF)
OS_FINDINGS: FULL
OD_FINDINGS: FULL

## 2023-02-10 ASSESSMENT — CORNEAL SURGICAL SCARRING: OS_SCARRING: STROMAL

## 2023-04-20 ENCOUNTER — APPOINTMENT (OUTPATIENT)
Dept: CARDIOLOGY | Facility: CLINIC | Age: 33
End: 2023-04-20
Payer: MEDICAID

## 2023-04-20 ENCOUNTER — NON-APPOINTMENT (OUTPATIENT)
Age: 33
End: 2023-04-20

## 2023-04-20 VITALS
OXYGEN SATURATION: 95 % | WEIGHT: 162 LBS | BODY MASS INDEX: 29.81 KG/M2 | HEIGHT: 62 IN | TEMPERATURE: 98 F | HEART RATE: 68 BPM | DIASTOLIC BLOOD PRESSURE: 82 MMHG | SYSTOLIC BLOOD PRESSURE: 121 MMHG

## 2023-04-20 DIAGNOSIS — I63.9 CEREBRAL INFARCTION, UNSPECIFIED: ICD-10-CM

## 2023-04-20 PROCEDURE — 93000 ELECTROCARDIOGRAM COMPLETE: CPT

## 2023-04-20 PROCEDURE — 99214 OFFICE O/P EST MOD 30 MIN: CPT | Mod: 25

## 2023-04-28 ENCOUNTER — OFFICE (OUTPATIENT)
Dept: URBAN - METROPOLITAN AREA CLINIC 116 | Facility: CLINIC | Age: 33
Setting detail: OPHTHALMOLOGY
End: 2023-04-28
Payer: COMMERCIAL

## 2023-04-28 DIAGNOSIS — H01.004: ICD-10-CM

## 2023-04-28 DIAGNOSIS — H11.442: ICD-10-CM

## 2023-04-28 DIAGNOSIS — H01.001: ICD-10-CM

## 2023-04-28 DIAGNOSIS — H50.111: ICD-10-CM

## 2023-04-28 PROBLEM — H52.203 ASTIGMATISM, UNSPECIFIED; BOTH EYES: Status: ACTIVE | Noted: 2023-04-28

## 2023-04-28 PROCEDURE — 92014 COMPRE OPH EXAM EST PT 1/>: CPT | Performed by: OPTOMETRIST

## 2023-04-28 ASSESSMENT — REFRACTION_MANIFEST
OD_AXIS: 140
OD_CYLINDER: -1.50
OS_VA1: 20/20
OD_CYLINDER: -1.75
OS_VA1: 20/25
OS_AXIS: 060
OD_SPHERE: -1.00
OD_SPHERE: PLANO
OD_VA1: 20/30
OS_SPHERE: PLANO
OD_AXIS: 120
OS_CYLINDER: -0.75
OS_SPHERE: -0.50
OD_VA1: 20/20
OS_CYLINDER: SPH

## 2023-04-28 ASSESSMENT — VISUAL ACUITY
OD_BCVA: 20/30
OS_BCVA: 20/50

## 2023-04-28 ASSESSMENT — REFRACTION_AUTOREFRACTION
OD_AXIS: 125
OS_AXIS: 060
OS_SPHERE: +0.25
OD_SPHERE: -0.25
OS_CYLINDER: -1.00
OD_CYLINDER: -2.00

## 2023-04-28 ASSESSMENT — KERATOMETRY
OD_K2POWER_DIOPTERS: 40.00
OD_K1POWER_DIOPTERS: 38.25
OS_K2POWER_DIOPTERS: 40.00
OS_AXISANGLE_DEGREES: 135
OD_AXISANGLE_DEGREES: 035
OS_K1POWER_DIOPTERS: 39.50

## 2023-04-28 ASSESSMENT — AXIALLENGTH_DERIVED
OD_AL: 25.8898
OS_AL: 25.1651
OD_AL: 26.1266

## 2023-04-28 ASSESSMENT — SPHEQUIV_DERIVED
OD_SPHEQUIV: -1.25
OD_SPHEQUIV: -1.75
OS_SPHEQUIV: -0.25

## 2023-04-28 ASSESSMENT — CONFRONTATIONAL VISUAL FIELD TEST (CVF)
OS_FINDINGS: FULL
OD_FINDINGS: FULL

## 2023-04-28 ASSESSMENT — SUPERFICIAL PUNCTATE KERATITIS (SPK)
OS_SPK: 2+
OD_SPK: 2+

## 2023-04-28 ASSESSMENT — CORNEAL SURGICAL SCARRING: OS_SCARRING: STROMAL

## 2023-04-28 ASSESSMENT — LID EXAM ASSESSMENTS
OD_BLEPHARITIS: 3+
OS_BLEPHARITIS: 3+

## 2023-07-14 ENCOUNTER — APPOINTMENT (OUTPATIENT)
Dept: ELECTROPHYSIOLOGY | Facility: CLINIC | Age: 33
End: 2023-07-14

## 2023-07-17 ENCOUNTER — APPOINTMENT (OUTPATIENT)
Dept: ELECTROPHYSIOLOGY | Facility: CLINIC | Age: 33
End: 2023-07-17

## 2023-08-03 ENCOUNTER — APPOINTMENT (OUTPATIENT)
Dept: CARDIOLOGY | Facility: CLINIC | Age: 33
End: 2023-08-03
Payer: MEDICAID

## 2023-08-03 VITALS
DIASTOLIC BLOOD PRESSURE: 98 MMHG | TEMPERATURE: 98 F | HEART RATE: 66 BPM | SYSTOLIC BLOOD PRESSURE: 146 MMHG | OXYGEN SATURATION: 96 % | WEIGHT: 161 LBS | BODY MASS INDEX: 29.45 KG/M2

## 2023-08-03 DIAGNOSIS — I42.0 DILATED CARDIOMYOPATHY: ICD-10-CM

## 2023-08-03 DIAGNOSIS — I50.9 HEART FAILURE, UNSPECIFIED: ICD-10-CM

## 2023-08-03 DIAGNOSIS — I48.91 UNSPECIFIED ATRIAL FIBRILLATION: ICD-10-CM

## 2023-08-03 DIAGNOSIS — I10 ESSENTIAL (PRIMARY) HYPERTENSION: ICD-10-CM

## 2023-08-03 DIAGNOSIS — I42.8 OTHER CARDIOMYOPATHIES: ICD-10-CM

## 2023-08-03 PROCEDURE — 99214 OFFICE O/P EST MOD 30 MIN: CPT

## 2023-08-03 RX ORDER — CARVEDILOL 25 MG/1
25 TABLET, FILM COATED ORAL
Qty: 180 | Refills: 2 | Status: ACTIVE | COMMUNITY
Start: 2022-06-22 | End: 1900-01-01

## 2023-08-03 NOTE — DISCUSSION/SUMMARY
[FreeTextEntry1] : Plan 1. NICM- LVEF 35-40% on TTE 7/2022. Repeat TTE done 2/2023 with LVEF 35-40%. Euvolemic. On Entresto to 97/102 mg  BID  - Increase Coreg to 25 mg BID  He does not want to start new meds. Needs aldactone and SGLT2 i 2. HTN- at goal. Continue current meds. Low salt diet. 3. s/p ICD for primary prevention- interrogation per EP clinic. Follow up with EP as scheduled.    4. s/p ASD closure- on ASA 5. CVA /?AF-  on AC (Xarelto), baby ASA, and lipitor 40 mg QD. Lipid panel.  6. Follow up in 3 months with EMILIE, discuss aldactone/SGLT2i

## 2023-08-03 NOTE — HISTORY OF PRESENT ILLNESS
[FreeTextEntry1] : 1/19/2023 32 year old male with PMH of NICM (diagnosed in 2012, EF 10-15% initially), s/p ICD (2013), CVA s/p percutaneous ASD closure in 2014, HTN, and HLD.  He was in the hospital in June 2022 diagnosed with CVA. He had multiple chronic infarcts noted on imaging. Did miss 2 days of Eliquis the night before he went to the hospital. TTE revealed LVEF 35-40%, grade I DD, trace MR, mild TR and an intact intra atrial septum; MEY was recommended but unable to be completed due to difficulty passing probe. In the hospital, Entresto dose was decreased due to hypotension, but increased back to 49/51 mg BID at office follow up in August 2022. During hospitalization, Eliquis was changed to Xarelto and he was restarted on a Statin.   Today he states he has not been taking the statin since the summer. He did not want to take more medications. He denies chest pain, SOB, CISSE, palpitations, lightheadedness, near syncope or syncope. Denies orthopnea, PND, weight gain, or edema. Denies bleeding on AC. He states he exercises at the gym 5 days/week, does 60 mins of cardio without chest pain or SOB.   4/20/2023 Patient here for follow up of NICM. He denies chest pain, SOB, CISSE, palpitations, edema, orthopnea/PND, near syncope or syncope. Denies weight gain. Denies bleeding episodes. He is states he hasn't been going to the gym because he has a baby on the way and has been busy preparing. Did not have previously ordered blood work done yet.   8/3/2023 Presents today for follow up. Feels well overall. Last visit did not want to increase GDMT. He feels well today and has no complaints. Had a baby boy in May, busy taking care of him. Denies cp or shortness of breath. Taking meds as prescribed.   Otherwise, denies orthopnea, paroxysmal nocturnal dyspnea, lower extremity edema, unexplained weight gain or dyspnea on exertion.

## 2023-08-03 NOTE — REVIEW OF SYSTEMS
[Hearing Loss] : hearing loss [Blood in stool] : no blood in stoo [Hematuria] : no hematuria [Dizziness] : no dizziness [Limb Weakness (Paresis)] : no limb weakness (Paresis) [Negative] : Heme/Lymph [FreeTextEntry4] : cleft palate as child s/p surgery, speech impairment

## 2023-08-03 NOTE — CARDIOLOGY SUMMARY
[de-identified] : 4/20/2023 Sinus Rhythm, Left axis, nonspecific t abnormality\par  \par  1/19/2023 Sinus Rhythm, Left axis, nonspecific t abnormality [de-identified] : 2/2023 LVEF 35-40%, trace MR, s/p ASD closure, no residual shunt\par  \par  7/2022 TTE with Definity and agitated saline: LVEF 35-40%, trace MR, mild TR, intact intra atrial septum  [de-identified] : June 2012 R/LHC: normal coronaries, severe PAH

## 2023-08-03 NOTE — PHYSICAL EXAM
[Well Developed] : well developed [No Acute Distress] : no acute distress [Normal Conjunctiva] : normal conjunctiva [No Carotid Bruit] : no carotid bruit [Normal] : clear lung fields, good air entry, no respiratory distress [Soft] : abdomen soft [Normal Gait] : normal gait [No Edema] : no edema [Moves all extremities] : moves all extremities [Alert and Oriented] : alert and oriented [de-identified] : facial deformity

## 2023-08-22 NOTE — DISCHARGE NOTE PROVIDER - NSDCHHENCOUNTER_GEN_ALL_CORE
Health Maintenance Due   Topic Date Due   • Hepatitis B Vaccine (1 of 3 - 3-dose series) Never done   • Pneumococcal Vaccine 0-64 (1 - PCV) Never done   • Shingles Vaccine (1 of 2) Never done   • COVID-19 Vaccine (3 - Pfizer series) 03/15/2022   • DTaP/Tdap/Td Vaccine (2 - Td or Tdap) 06/15/2022   • Diabetes Foot Exam  08/11/2022   • Colorectal Cancer Screen-  08/28/2022   • Diabetes A1C  08/03/2023       Patient is due for topics as listed above but is not proceeding with Immunization(s) COVID-19, Dtap/Tdap/Td, Hep B and Pneumococcal, Colorectal Cancer Screening: Colonoscopy, iFOBT and Cologuard, Diabetes A1C and Diabetes Foot Exam at this time.    22-Jun-2022

## 2023-10-18 ENCOUNTER — RX RENEWAL (OUTPATIENT)
Age: 33
End: 2023-10-18

## 2023-11-15 NOTE — PROGRESS NOTE ADULT - PROBLEM SELECTOR PLAN 5
POC reviewed with Radha Abbott and family. Questions and concerns addressed. CVP: 8, 12 mmHg. Rt IJ central line inserted.  See below and flowsheets for full assessment and VS info.       Neuro:  Given Coma Scale  Best Eye Response: 4-->(E4) spontaneous  Best Motor Response: 6-->(M6) obeys commands  Best Verbal Response: 5-->(V5) oriented  Michelle Coma Scale Score: 15  Assessment Qualifiers: patient not sedated/intubated  Pupil PERRLA: yes    24 hr Temp:  [97.4 °F (36.3 °C)-98.1 °F (36.7 °C)]     CV:  Rhythm: normal sinus rhythm   DVT prophylaxis: VTE Required Core Measure: Pharmacological prophylaxis initiated/maintained    CVP (mean): 12 mmHg (11/14/23 1105)       SVO2 (%): 49 % (11/13/23 0515)               Pulses  Right Radial Pulse: 2+ (normal)  Left Radial Pulse: 2+ (normal)  Right Dorsalis Pedis Pulse: 2+ (normal)  Left Dorsalis Pedis Pulse: 2+ (normal)  Right Posterior Tibial Pulse: 1+ (weak)  Left Posterior Tibial Pulse: 1+ (weak)    Resp:          GI/:  GI prophylaxis: yes  Diet/Nutrition Received: low saturated fat/low cholesterol, 2 gram sodium  Last Bowel Movement: 11/13/23  Voiding Characteristics: urethral catheter (bladder)       Urethral Catheter 11/13/23 1800 Straight-tip 16 Fr.-Reason for Continuing Urinary Catheterization: Critically ill in ICU and requiring hourly monitoring of intake/output   Intake/Output Summary (Last 24 hours) at 11/14/2023 1823  Last data filed at 11/14/2023 1805  Gross per 24 hour   Intake 1376.44 ml   Output 3170 ml   Net -1793.56 ml            Labs/Accuchecks:  Recent Labs   Lab 11/12/23  0603 11/13/23  0504 11/14/23  0435   WBC 3.61* 4.24 4.20   RBC 2.61* 2.59* 2.64*   HGB 7.7* 7.8* 7.8*   HCT 23.8* 23.5* 23.5*    253 247      Recent Labs   Lab 11/11/23  0958 11/11/23  1600 11/13/23  1738 11/14/23  0042 11/14/23  0435   INR 1.2  --   --   --   --    APTT 31.3   < > 33.3* 58.1* 55.8*    < > = values in this interval not displayed.      Recent Labs      11/14/23  0435      K 4.2   CO2 23      BUN 62*   CREATININE 3.1*       Recent Labs   Lab 11/09/23  0656   TROPONINI 0.053*      Recent Labs     11/12/23  0554 11/13/23  0512 11/13/23  1942   PH 7.423 7.421 7.409   PCO2 36.7 35.0 35.7   PO2 24* 26* 22*   HCO3 24.0 22.8* 22.6*   POCSATURATED 45 49 39   BE 0 -2 -2       Electrolytes: N/A - electrolytes WDL  Accuchecks: ACHS    Gtts/LDAs:   sodium chloride 0.9%      DOBUTamine 5 mcg/kg/min (11/14/23 1805)    furosemide (LASIX) 500 mg in 50 mL infusion (conc: 10 mg/mL) 20 mg/hr (11/14/23 1805)    heparin (porcine) in D5W 16 Units/kg/hr (11/14/23 1805)       Lines/Drains/Airways       Peripherally Inserted Central Catheter Line  Duration             PICC Double Lumen 10/13/23 1542 right basilic 32 days              Central Venous Catheter Line  Duration             Percutaneous Central Line Insertion/Assessment - Triple Lumen  11/14/23 1630 <1 day              Drain  Duration                  Urethral Catheter 11/13/23 1800 Straight-tip 16 Fr. 1 day              Peripheral Intravenous Line  Duration                  Midline Catheter Insertion/Assessment  - Single Lumen 11/14/23 1113 Left brachial vein 20g x 10cm <1 day                    Skin/Wounds  Bathing/Skin Care: bath, complete;linen changed;dressed/undressed (11/14/23 1105)  Wounds: No  Wound care consulted: No   controlled.  Will restart coreg 12.5mg BID with parameter.

## 2023-11-17 ENCOUNTER — APPOINTMENT (OUTPATIENT)
Dept: CARDIOLOGY | Facility: CLINIC | Age: 33
End: 2023-11-17

## 2023-11-20 NOTE — OCCUPATIONAL THERAPY INITIAL EVALUATION ADULT - MUSCLE TONE ASSESSMENT, REHAB EVAL
[facility] - Periop Services  Brief Operative Note    Surgery Date: 11/20/2023     Surgeon(s) and Role:     * Shashi Montano MD - Primary     * Stephanie Sultana MD - Resident - Assisting        Pre-op Diagnosis:  R Tympanic membrane perforation    Post-op Diagnosis:  Post-Op Diagnosis Codes:     * Tympanic membrane perforation, right [H72.91]     * Mixed conductive and sensorineural hearing loss of right ear with restricted hearing of left ear [H90.A31]    Procedure(s) (LRB):  TYMPANOPLASTY (Right)    Anesthesia: General    Operative Findings: see op note    Estimated Blood Loss: * No values recorded between 11/20/2023  7:36 AM and 11/20/2023  9:39 AM *         Specimens:   Specimen (24h ago, onward)       Start     Ordered    11/20/23 0823  Specimen to Pathology  RELEASE UPON ORDERING        References:    Click here for ordering Quick Tip   Question:  Release to patient  Answer:  Immediate    11/20/23 0823                      Discharge Note    OUTCOME: Patient tolerated treatment/procedure well without complication and is now ready for discharge.    DISPOSITION: Home or Self Care    FINAL DIAGNOSIS:  R Tympanic membrane perforation    FOLLOWUP: In clinic    DISCHARGE INSTRUCTIONS:  No discharge procedures on file.    
bilateral upper extremities/normal
Smoking even a single puff increases the likelihood of a full relapse, withdrawal symptoms peak within 1-2 weeks, but can persist for months

## 2023-12-05 NOTE — H&P ADULT - NSHPREVIEWOFSYSTEMS_GEN_ALL_CORE
PSG and follow-up appointment with provider have both been scheduled.  Brianna Wall, CMA     ROS:  Constitutional: No fever, weight loss or fatigue  ENMT:  No difficulty hearing, tinnitus, vertigo; No sinus or throat pain  Neck: No pain or stiffness  Respiratory: No cough, wheezing, chills or hemoptysis  Cardiovascular: No chest pain, palpitations, shortness of breath, dizziness or leg swelling  Gastrointestinal: No abdominal or epigastric pain. No nausea, vomiting or hematemesis; No diarrhea or constipation. No melena or hematochezia.  Genitourinary: No dysuria, frequency, hematuria or incontinence  Rectal: No pain, hemorrhoids or incontinence  Neurological: No headaches, memory loss, loss of strength, numbness or tremors  Skin: No itching, burning, rashes or lesions   Endocrine: No heat or cold intolerance; No hair loss  Musculoskeletal: No joint pain or swelling; No muscle, back or extremity pain  Heme/Lymph: No easy bruising or bleeding gums  Psychiatric: No depression, anxiety, mood swings or difficulty sleeping ROS:  Constitutional: No fever, weight loss or fatigue  ENMT:  No vertigo; No throat pain  Neck: No pain or stiffness  Respiratory: + cough. No wheezing, chills or hemoptysis  Cardiovascular: No chest pain, palpitations, dizziness or leg swelling. + shortness of breath,   Gastrointestinal: No abdominal or epigastric pain. No nausea, vomiting or hematemesis; No diarrhea or constipation. No melena or hematochezia.  Genitourinary: No dysuria  Neurological: No headaches, memory loss, loss of strength, numbness or tremors  Skin: No rashes  Endocrine: Fluctuating weight gain/loss.  Musculoskeletal: No joint pain or swelling; No back or extremity pain  Heme/Lymph: No easy bruising

## 2023-12-14 ENCOUNTER — APPOINTMENT (OUTPATIENT)
Dept: CARDIOLOGY | Facility: CLINIC | Age: 33
End: 2023-12-14

## 2024-01-10 ENCOUNTER — RX RENEWAL (OUTPATIENT)
Age: 34
End: 2024-01-10

## 2024-01-10 RX ORDER — ATORVASTATIN CALCIUM 40 MG/1
40 TABLET, FILM COATED ORAL
Qty: 90 | Refills: 1 | Status: ACTIVE | COMMUNITY
Start: 2022-06-22 | End: 1900-01-01

## 2024-02-05 NOTE — ED PROVIDER NOTE - BIRTH SEX
Take all medicines exactly as prescribed. Call your doctor if you think you are having a problem with your medicine.  Get some extra rest.  Take an over-the-counter pain medicine, such as acetaminophen (Tylenol), ibuprofen (Advil, Motrin), or naproxen (Aleve) to reduce fever and relieve body aches. Read and follow all instructions on the label.  Do not take two or more pain medicines at the same time unless the doctor told you to. Many pain medicines have acetaminophen, which is Tylenol. Too much acetaminophen (Tylenol) can be harmful.  Take an over-the-counter cough medicine to help quiet a dry, hacking cough so that you can sleep. Avoid cough medicines that have more than one active ingredient. Read and follow all instructions on the label.  Do not smoke. Smoking can make bronchitis worse. If you need help quitting, talk to your doctor about stop-smoking programs and medicines. These can increase your chances of quitting for good.   Male

## 2024-03-20 ENCOUNTER — RX RENEWAL (OUTPATIENT)
Age: 34
End: 2024-03-20

## 2024-03-20 RX ORDER — SACUBITRIL AND VALSARTAN 97; 103 MG/1; MG/1
97-103 TABLET, FILM COATED ORAL
Qty: 60 | Refills: 0 | Status: ACTIVE | COMMUNITY
Start: 2022-06-22 | End: 1900-01-01

## 2024-04-04 ENCOUNTER — RX RENEWAL (OUTPATIENT)
Age: 34
End: 2024-04-04

## 2024-05-03 ENCOUNTER — APPOINTMENT (OUTPATIENT)
Dept: ELECTROPHYSIOLOGY | Facility: CLINIC | Age: 34
End: 2024-05-03
Payer: MEDICAID

## 2024-05-03 ENCOUNTER — NON-APPOINTMENT (OUTPATIENT)
Age: 34
End: 2024-05-03

## 2024-05-03 PROCEDURE — 93282 PRGRMG EVAL IMPLANTABLE DFB: CPT

## 2024-06-07 ENCOUNTER — RX RENEWAL (OUTPATIENT)
Age: 34
End: 2024-06-07

## 2024-06-07 RX ORDER — RIVAROXABAN 20 MG/1
20 TABLET, FILM COATED ORAL
Qty: 10 | Refills: 0 | Status: ACTIVE | COMMUNITY
Start: 2022-08-01 | End: 1900-01-01

## 2024-06-13 ENCOUNTER — APPOINTMENT (OUTPATIENT)
Dept: CARDIOLOGY | Facility: CLINIC | Age: 34
End: 2024-06-13

## 2024-06-28 NOTE — PROGRESS NOTE ADULT - PROBLEM SELECTOR PLAN 3
How Severe Is Your Skin Discoloration?: mild -likely pleuritic in nature given active pneumonia however given extensive cardiac history cannot exclude cardiac etiology at this time.   -Will admit to monitored bed, will downgrade when appropriate  -EKG reviewed, no previous EKG available, T wave inversions evident in inferolateral leads  -serial troponin, first set and second set <0.01  -will consider cardiology consult pending course- Pt has seen [facility] cardiology in past

## 2024-08-27 ENCOUNTER — RX RENEWAL (OUTPATIENT)
Age: 34
End: 2024-08-27

## 2024-09-10 ENCOUNTER — APPOINTMENT (OUTPATIENT)
Dept: CARDIOLOGY | Facility: CLINIC | Age: 34
End: 2024-09-10
Payer: MEDICAID

## 2024-09-10 ENCOUNTER — NON-APPOINTMENT (OUTPATIENT)
Age: 34
End: 2024-09-10

## 2024-09-10 VITALS
DIASTOLIC BLOOD PRESSURE: 100 MMHG | OXYGEN SATURATION: 98 % | SYSTOLIC BLOOD PRESSURE: 134 MMHG | HEART RATE: 69 BPM | BODY MASS INDEX: 29.26 KG/M2 | WEIGHT: 159 LBS | HEIGHT: 62 IN

## 2024-09-10 DIAGNOSIS — Z95.810 PRESENCE OF AUTOMATIC (IMPLANTABLE) CARDIAC DEFIBRILLATOR: ICD-10-CM

## 2024-09-10 DIAGNOSIS — I10 ESSENTIAL (PRIMARY) HYPERTENSION: ICD-10-CM

## 2024-09-10 DIAGNOSIS — I42.8 OTHER CARDIOMYOPATHIES: ICD-10-CM

## 2024-09-10 DIAGNOSIS — I63.9 CEREBRAL INFARCTION, UNSPECIFIED: ICD-10-CM

## 2024-09-10 DIAGNOSIS — I48.91 UNSPECIFIED ATRIAL FIBRILLATION: ICD-10-CM

## 2024-09-10 DIAGNOSIS — I50.9 HEART FAILURE, UNSPECIFIED: ICD-10-CM

## 2024-09-10 PROCEDURE — 99214 OFFICE O/P EST MOD 30 MIN: CPT | Mod: 25

## 2024-09-10 PROCEDURE — 93000 ELECTROCARDIOGRAM COMPLETE: CPT

## 2024-09-10 NOTE — PHYSICAL EXAM
[No Acute Distress] : no acute distress [No Carotid Bruit] : no carotid bruit Strong peripheral pulses [Normal S1, S2] : normal S1, S2 [No Murmur] : no murmur [Clear Lung Fields] : clear lung fields [No Respiratory Distress] : no respiratory distress  [Normal Gait] : normal gait [No Edema] : no edema [Moves all extremities] : moves all extremities [Alert and Oriented] : alert and oriented [Normal memory] : normal memory [de-identified] : cleft palate as child, s/p surgery. uses hearing aides  [de-identified] : speech impairment

## 2024-09-10 NOTE — HISTORY OF PRESENT ILLNESS
[FreeTextEntry1] : 1/19/2023 32 year old male with PMH of NICM (diagnosed in 2012, EF 10-15% initially), s/p ICD (2013), CVA s/p percutaneous ASD closure in 2014, HTN, and HLD.  He was in the hospital in June 2022 diagnosed with CVA. He had multiple chronic infarcts noted on imaging. Did miss 2 days of Eliquis the night before he went to the hospital. TTE revealed LVEF 35-40%, grade I DD, trace MR, mild TR and an intact intra atrial septum; MEY was recommended but unable to be completed due to difficulty passing probe. In the hospital, Entresto dose was decreased due to hypotension, but increased back to 49/51 mg BID at office follow up in August 2022. During hospitalization, Eliquis was changed to Xarelto and he was restarted on a Statin.   Today he states he has not been taking the statin since the summer. He did not want to take more medications. He denies chest pain, SOB, CISSE, palpitations, lightheadedness, near syncope or syncope. Denies orthopnea, PND, weight gain, or edema. Denies bleeding on AC. He states he exercises at the gym 5 days/week, does 60 mins of cardio without chest pain or SOB.   4/20/2023 Patient here for follow up of NICM. He denies chest pain, SOB, CISSE, palpitations, edema, orthopnea/PND, near syncope or syncope. Denies weight gain. Denies bleeding episodes. He is states he hasn't been going to the gym because he has a baby on the way and has been busy preparing. Did not have previously ordered blood work done yet.   8/3/2023 Presents today for follow up. Feels well overall. Last visit did not want to increase GDMT. He feels well today and has no complaints. Had a baby boy in May, busy taking care of him. Denies cp or shortness of breath. Taking meds as prescribed.   Otherwise, denies orthopnea, paroxysmal nocturnal dyspnea, lower extremity edema, unexplained weight gain or dyspnea on exertion.  9/10/2024: Patient presents to the office for follow up of NICM. Reports feeling "great." States his BP is high sometimes at other offices, he does not check at home because it makes him nervous. Denies chest pain, SOB at rest, CISSE, palpitations, lightheadedness, dizziness, fatigue, syncope, near syncope and LE edema. Denies smoking, excessive alcohol and illicit drug use. Last labs were done about a year ago. He is home caring for his toddler son full time.

## 2024-09-10 NOTE — CARDIOLOGY SUMMARY
[de-identified] : 4/20/2023 Sinus Rhythm, Left axis, nonspecific t abnormality  1/19/2023 Sinus Rhythm, Left axis, nonspecific t abnormality  9/10/2024: Sinus Rhythm  -Nonspecific QRS widening. -Horizontal axis for age. -Nonspecific T-abnormality. Consistent with prior EKG [de-identified] : 2/2023 LVEF 35-40%, trace MR, s/p ASD closure, no residual shunt\par  \par  7/2022 TTE with Definity and agitated saline: LVEF 35-40%, trace MR, mild TR, intact intra atrial septum  [de-identified] : June 2012 R/LHC: normal coronaries, severe PAH

## 2024-09-10 NOTE — DISCUSSION/SUMMARY
[FreeTextEntry1] : Patient presents to the office for follow up of NICM.   Assessment/ Plan: 1. NICM- LVEF 35-40% on TTE 7/2022. Repeat TTE done 2/2023 with LVEF 35-40%. Euvolemic. On Entresto to 97/102 mg  BID and Coreg 25 mg BID.  - Discussed adding GDMT for cardiomyopathy and better BP control. He does not want to start new meds. - Routine labs ordered.  - Needs Aldactone and SGLT2 i, however, refuses new medication. - TTE ordered to monitor LV function.  2. HTN- elevated. See above. Continue current meds. Low salt diet. 3. s/p ICD for primary prevention- interrogation per EP clinic. Follow up with EP as scheduled.    4. s/p ASD closure- on ASA 5. CVA /?AF-  on AC (Xarelto), baby ASA, and lipitor 40 mg QD. Lipid panel ordered.  6. Follow up in 6 months with Dr. Pace, sooner if needed.  Patient verbalized understanding and is in agreement with the above plan.  Mikki Moulton was present in office at the time of visit. [EKG obtained to assist in diagnosis and management of assessed problem(s)] : EKG obtained to assist in diagnosis and management of assessed problem(s)

## 2024-09-23 ENCOUNTER — OFFICE (OUTPATIENT)
Dept: URBAN - METROPOLITAN AREA CLINIC 94 | Facility: CLINIC | Age: 34
Setting detail: OPHTHALMOLOGY
End: 2024-09-23
Payer: MEDICAID

## 2024-09-23 DIAGNOSIS — H52.203: ICD-10-CM

## 2024-09-23 DIAGNOSIS — H02.402: ICD-10-CM

## 2024-09-23 DIAGNOSIS — H01.001: ICD-10-CM

## 2024-09-23 DIAGNOSIS — H01.004: ICD-10-CM

## 2024-09-23 DIAGNOSIS — H50.111: ICD-10-CM

## 2024-09-23 DIAGNOSIS — H11.442: ICD-10-CM

## 2024-09-23 PROCEDURE — 92014 COMPRE OPH EXAM EST PT 1/>: CPT | Performed by: PHYSICIAN ASSISTANT

## 2024-09-23 ASSESSMENT — LID EXAM ASSESSMENTS
OD_BLEPHARITIS: 3+
OS_BLEPHARITIS: 3+

## 2024-09-23 ASSESSMENT — CONFRONTATIONAL VISUAL FIELD TEST (CVF)
OS_FINDINGS: FULL
OD_FINDINGS: FULL

## 2024-09-24 ENCOUNTER — APPOINTMENT (OUTPATIENT)
Dept: CARDIOLOGY | Facility: CLINIC | Age: 34
End: 2024-09-24

## 2024-10-01 ENCOUNTER — OFFICE (OUTPATIENT)
Dept: URBAN - METROPOLITAN AREA CLINIC 94 | Facility: CLINIC | Age: 34
Setting detail: OPHTHALMOLOGY
End: 2024-10-01
Payer: MEDICAID

## 2024-10-01 DIAGNOSIS — H02.402: ICD-10-CM

## 2024-10-01 DIAGNOSIS — H11.442: ICD-10-CM

## 2024-10-01 DIAGNOSIS — H01.004: ICD-10-CM

## 2024-10-01 DIAGNOSIS — H01.001: ICD-10-CM

## 2024-10-01 PROCEDURE — 92285 EXTERNAL OCULAR PHOTOGRAPHY: CPT | Performed by: OPHTHALMOLOGY

## 2024-10-01 PROCEDURE — 99214 OFFICE O/P EST MOD 30 MIN: CPT | Performed by: OPHTHALMOLOGY

## 2024-10-01 ASSESSMENT — REFRACTION_CURRENTRX
OD_AXIS: 132
OS_SPHERE: -0.50
OD_CYLINDER: -1.50
OD_VPRISM_DIRECTION: SV
OD_OVR_VA: 20/
OS_VPRISM_DIRECTION: SV
OS_CYLINDER: SPH
OS_OVR_VA: 20/
OD_SPHERE: -1.00

## 2024-10-01 ASSESSMENT — CORNEAL SURGICAL SCARRING: OS_SCARRING: STROMAL

## 2024-10-01 ASSESSMENT — KERATOMETRY
OD_K2POWER_DIOPTERS: 39.50
OS_K2POWER_DIOPTERS: 40.25
OD_AXISANGLE_DEGREES: 029
OS_AXISANGLE_DEGREES: 146
OD_K1POWER_DIOPTERS: 37.00
METHOD_AUTO_MANUAL: MANUAL
OS_K1POWER_DIOPTERS: 39.25

## 2024-10-01 ASSESSMENT — REFRACTION_MANIFEST
OD_SPHERE: PLANO
OS_VA1: 20/25
OS_SPHERE: -0.50
OD_VA1: 20/30
OS_SPHERE: PLANO
OS_VA1: 20/20
OS_CYLINDER: -0.75
OD_CYLINDER: -1.75
OD_VA1: 20/20
OD_AXIS: 140
OD_CYLINDER: -1.50
OS_CYLINDER: SPH
OD_AXIS: 120
OS_AXIS: 060
OD_SPHERE: -1.00

## 2024-10-01 ASSESSMENT — CONFRONTATIONAL VISUAL FIELD TEST (CVF)
OS_FINDINGS: FULL
OD_FINDINGS: FULL

## 2024-10-01 ASSESSMENT — REFRACTION_AUTOREFRACTION
OS_CYLINDER: -0.50
OD_AXIS: 126
OD_SPHERE: 0.00
OD_CYLINDER: -1.75
OS_AXIS: 035
OS_SPHERE: -0.25

## 2024-10-01 ASSESSMENT — VISUAL ACUITY
OS_BCVA: 20/25
OD_BCVA: 20/20-1

## 2024-10-01 ASSESSMENT — SUPERFICIAL PUNCTATE KERATITIS (SPK)
OS_SPK: 2+
OD_SPK: 2+

## 2024-10-01 ASSESSMENT — TONOMETRY
OS_IOP_MMHG: 15
OD_IOP_MMHG: 14

## 2024-10-01 ASSESSMENT — LID EXAM ASSESSMENTS
OD_BLEPHARITIS: 3+
OS_BLEPHARITIS: 3+

## 2024-10-21 ENCOUNTER — NON-APPOINTMENT (OUTPATIENT)
Age: 34
End: 2024-10-21

## 2024-11-04 ENCOUNTER — ASC (OUTPATIENT)
Dept: URBAN - METROPOLITAN AREA SURGERY 8 | Facility: SURGERY | Age: 34
Setting detail: OPHTHALMOLOGY
End: 2024-11-04
Payer: MEDICAID

## 2024-11-04 DIAGNOSIS — H11.442: ICD-10-CM

## 2024-11-04 PROCEDURE — 65780 OCULAR RECONST TRANSPLANT: CPT | Mod: LT | Performed by: OPHTHALMOLOGY

## 2024-11-04 PROCEDURE — 68320 REVISE/GRAFT EYELID LINING: CPT | Mod: LT | Performed by: OPHTHALMOLOGY

## 2024-11-05 ENCOUNTER — OFFICE (OUTPATIENT)
Dept: URBAN - METROPOLITAN AREA CLINIC 94 | Facility: CLINIC | Age: 34
Setting detail: OPHTHALMOLOGY
End: 2024-11-05
Payer: MEDICAID

## 2024-11-05 ENCOUNTER — RX ONLY (RX ONLY)
Age: 34
End: 2024-11-05

## 2024-11-05 DIAGNOSIS — H11.442: ICD-10-CM

## 2024-11-05 PROCEDURE — 99024 POSTOP FOLLOW-UP VISIT: CPT | Performed by: OPHTHALMOLOGY

## 2024-11-05 ASSESSMENT — REFRACTION_MANIFEST
OS_VA1: 20/20
OD_CYLINDER: -1.50
OD_AXIS: 120
OD_CYLINDER: -1.75
OD_SPHERE: -1.00
OD_VA1: 20/30
OS_CYLINDER: SPH
OS_SPHERE: PLANO
OD_SPHERE: PLANO
OD_VA1: 20/20
OD_AXIS: 140
OS_SPHERE: -0.50
OS_VA1: 20/25
OS_AXIS: 060
OS_CYLINDER: -0.75

## 2024-11-05 ASSESSMENT — LID EXAM ASSESSMENTS
OD_BLEPHARITIS: 3+
OS_BLEPHARITIS: 3+

## 2024-11-05 ASSESSMENT — SUPERFICIAL PUNCTATE KERATITIS (SPK)
OD_SPK: 2+
OS_SPK: 2+

## 2024-11-05 ASSESSMENT — REFRACTION_CURRENTRX
OD_CYLINDER: -1.50
OD_AXIS: 132
OS_SPHERE: -0.50
OD_VPRISM_DIRECTION: SV
OS_CYLINDER: SPH
OS_OVR_VA: 20/
OD_SPHERE: -1.00
OD_OVR_VA: 20/
OS_VPRISM_DIRECTION: SV

## 2024-11-05 ASSESSMENT — KERATOMETRY
OS_K2POWER_DIOPTERS: 40.25
OS_AXISANGLE_DEGREES: 146
OS_K1POWER_DIOPTERS: 39.25
METHOD_AUTO_MANUAL: MANUAL
OD_K1POWER_DIOPTERS: 37.00
OD_AXISANGLE_DEGREES: 029
OD_K2POWER_DIOPTERS: 39.50

## 2024-11-05 ASSESSMENT — REFRACTION_AUTOREFRACTION
OS_CYLINDER: -0.50
OD_AXIS: 126
OS_SPHERE: -0.25
OD_CYLINDER: -1.75
OD_SPHERE: 0.00
OS_AXIS: 035

## 2024-11-05 ASSESSMENT — VISUAL ACUITY
OS_BCVA: 20/25
OD_BCVA: 20/20-1

## 2024-11-05 ASSESSMENT — CONFRONTATIONAL VISUAL FIELD TEST (CVF)
OD_FINDINGS: FULL
OS_FINDINGS: FULL

## 2024-11-05 ASSESSMENT — CORNEAL SURGICAL SCARRING: OS_SCARRING: STROMAL

## 2025-01-05 ENCOUNTER — OFFICE (OUTPATIENT)
Dept: URBAN - METROPOLITAN AREA CLINIC 94 | Facility: CLINIC | Age: 35
Setting detail: OPHTHALMOLOGY
End: 2025-01-05
Payer: MEDICAID

## 2025-01-05 DIAGNOSIS — H01.004: ICD-10-CM

## 2025-01-05 DIAGNOSIS — H01.001: ICD-10-CM

## 2025-01-05 DIAGNOSIS — H11.442: ICD-10-CM

## 2025-01-05 PROCEDURE — 99024 POSTOP FOLLOW-UP VISIT: CPT | Performed by: REGISTERED NURSE

## 2025-01-05 ASSESSMENT — SUPERFICIAL PUNCTATE KERATITIS (SPK)
OD_SPK: 2+
OS_SPK: 2+

## 2025-01-05 ASSESSMENT — KERATOMETRY
OD_AXISANGLE_DEGREES: 039
OD_K1POWER_DIOPTERS: 37.75
OS_AXISANGLE_DEGREES: 146
METHOD_AUTO_MANUAL: MANUAL
OS_K1POWER_DIOPTERS: 38.00
OS_K2POWER_DIOPTERS: 39.75
OD_K2POWER_DIOPTERS: 40.00

## 2025-01-05 ASSESSMENT — REFRACTION_CURRENTRX
OD_OVR_VA: 20/
OD_SPHERE: -1.00
OS_OVR_VA: 20/
OS_VPRISM_DIRECTION: SV
OD_CYLINDER: -1.50
OS_SPHERE: -0.50
OD_AXIS: 132
OD_VPRISM_DIRECTION: SV
OS_CYLINDER: SPH

## 2025-01-05 ASSESSMENT — REFRACTION_AUTOREFRACTION
OD_SPHERE: +0.50
OD_CYLINDER: -2.25
OS_SPHERE: +1.00
OS_AXIS: 041
OS_CYLINDER: -1.50
OD_AXIS: 126

## 2025-01-05 ASSESSMENT — TONOMETRY
OS_IOP_MMHG: 13
OD_IOP_MMHG: 13

## 2025-01-05 ASSESSMENT — LID EXAM ASSESSMENTS
OS_BLEPHARITIS: 3+
OD_BLEPHARITIS: 3+

## 2025-01-05 ASSESSMENT — REFRACTION_MANIFEST
OD_SPHERE: PLANO
OS_SPHERE: PLANO
OS_CYLINDER: -0.75
OD_VA1: 20/20
OD_CYLINDER: -1.50
OD_AXIS: 140
OD_CYLINDER: -1.75
OD_VA1: 20/30
OS_SPHERE: -0.50
OD_SPHERE: -1.00
OD_AXIS: 120
OS_AXIS: 060
OS_VA1: 20/25
OS_CYLINDER: SPH
OS_VA1: 20/20

## 2025-01-05 ASSESSMENT — VISUAL ACUITY
OD_BCVA: 20/25-1
OS_BCVA: 20/40

## 2025-01-05 ASSESSMENT — CORNEAL SURGICAL SCARRING: OS_SCARRING: STROMAL

## 2025-01-05 ASSESSMENT — CONFRONTATIONAL VISUAL FIELD TEST (CVF)
OS_FINDINGS: FULL
OD_FINDINGS: FULL

## 2025-01-14 NOTE — ED PROVIDER NOTE - CROS ED [facility] ALL NEG
negative...

## 2025-02-02 ENCOUNTER — OFFICE (OUTPATIENT)
Dept: URBAN - METROPOLITAN AREA CLINIC 94 | Facility: CLINIC | Age: 35
Setting detail: OPHTHALMOLOGY
End: 2025-02-02
Payer: MEDICAID

## 2025-02-02 DIAGNOSIS — H01.004: ICD-10-CM

## 2025-02-02 DIAGNOSIS — H11.442: ICD-10-CM

## 2025-02-02 DIAGNOSIS — H01.001: ICD-10-CM

## 2025-02-02 PROCEDURE — 99024 POSTOP FOLLOW-UP VISIT: CPT | Performed by: REGISTERED NURSE

## 2025-02-02 ASSESSMENT — REFRACTION_CURRENTRX
OS_SPHERE: -0.50
OD_AXIS: 132
OS_CYLINDER: SPH
OD_OVR_VA: 20/
OD_CYLINDER: -1.50
OS_OVR_VA: 20/
OS_VPRISM_DIRECTION: SV
OD_SPHERE: -1.00
OD_VPRISM_DIRECTION: SV

## 2025-02-02 ASSESSMENT — VISUAL ACUITY
OD_BCVA: 20/20
OS_BCVA: 20/30-1

## 2025-02-02 ASSESSMENT — REFRACTION_MANIFEST
OS_CYLINDER: -0.75
OD_CYLINDER: -1.75
OD_CYLINDER: -1.50
OD_AXIS: 120
OS_CYLINDER: SPH
OS_SPHERE: PLANO
OD_VA1: 20/30
OS_VA1: 20/25
OD_VA1: 20/20
OS_VA1: 20/20
OD_SPHERE: -1.00
OS_SPHERE: -0.50
OD_AXIS: 140
OD_SPHERE: PLANO
OS_AXIS: 060

## 2025-02-02 ASSESSMENT — CONFRONTATIONAL VISUAL FIELD TEST (CVF)
OD_FINDINGS: FULL
OS_FINDINGS: FULL

## 2025-02-02 ASSESSMENT — TONOMETRY
OS_IOP_MMHG: 14
OD_IOP_MMHG: 13

## 2025-02-02 ASSESSMENT — LID EXAM ASSESSMENTS
OS_BLEPHARITIS: 3+
OD_BLEPHARITIS: 3+

## 2025-02-02 ASSESSMENT — CORNEAL SURGICAL SCARRING: OS_SCARRING: STROMAL

## 2025-02-02 ASSESSMENT — REFRACTION_AUTOREFRACTION
OD_SPHERE: 0.00
OS_SPHERE: UTP
OD_CYLINDER: -2.00
OD_AXIS: 129

## 2025-02-02 ASSESSMENT — KERATOMETRY
OS_K1POWER_DIOPTERS: UTP
OD_K2POWER_DIOPTERS: 40.50
OD_K1POWER_DIOPTERS: 38.50
OD_AXISANGLE_DEGREES: 049
METHOD_AUTO_MANUAL: MANUAL

## 2025-02-02 ASSESSMENT — SUPERFICIAL PUNCTATE KERATITIS (SPK)
OD_SPK: 2+
OS_SPK: 2+

## 2025-02-04 ENCOUNTER — OFFICE (OUTPATIENT)
Dept: URBAN - METROPOLITAN AREA CLINIC 94 | Facility: CLINIC | Age: 35
Setting detail: OPHTHALMOLOGY
End: 2025-02-04
Payer: MEDICAID

## 2025-02-04 DIAGNOSIS — H02.402: ICD-10-CM

## 2025-02-04 PROCEDURE — 92012 INTRM OPH EXAM EST PATIENT: CPT | Performed by: OPHTHALMOLOGY

## 2025-02-04 ASSESSMENT — REFRACTION_CURRENTRX
OD_OVR_VA: 20/
OD_AXIS: 132
OS_CYLINDER: SPH
OD_SPHERE: -1.00
OS_VPRISM_DIRECTION: SV
OS_OVR_VA: 20/
OD_VPRISM_DIRECTION: SV
OD_CYLINDER: -1.50
OS_SPHERE: -0.50

## 2025-02-04 ASSESSMENT — VISUAL ACUITY
OS_BCVA: 20/25-1
OD_BCVA: 20/20-1

## 2025-02-04 ASSESSMENT — REFRACTION_MANIFEST
OD_AXIS: 120
OD_VA1: 20/20
OD_VA1: 20/30
OS_SPHERE: PLANO
OS_VA1: 20/25
OS_SPHERE: -0.50
OS_VA1: 20/20
OD_SPHERE: PLANO
OD_AXIS: 140
OS_CYLINDER: SPH
OD_SPHERE: -1.00
OS_AXIS: 060
OS_CYLINDER: -0.75
OD_CYLINDER: -1.50
OD_CYLINDER: -1.75

## 2025-02-04 ASSESSMENT — SUPERFICIAL PUNCTATE KERATITIS (SPK)
OD_SPK: 2+
OS_SPK: 2+

## 2025-02-04 ASSESSMENT — REFRACTION_AUTOREFRACTION
OS_SPHERE: UTP
OD_AXIS: 129
OD_SPHERE: 0.00
OD_CYLINDER: -2.00

## 2025-02-04 ASSESSMENT — LID EXAM ASSESSMENTS
OS_BLEPHARITIS: 3+
OD_BLEPHARITIS: 3+

## 2025-02-04 ASSESSMENT — KERATOMETRY
OD_K1POWER_DIOPTERS: 38.50
OD_AXISANGLE_DEGREES: 049
OD_K2POWER_DIOPTERS: 40.50
METHOD_AUTO_MANUAL: MANUAL
OS_K1POWER_DIOPTERS: UTP

## 2025-02-04 ASSESSMENT — CORNEAL SURGICAL SCARRING: OS_SCARRING: STROMAL

## 2025-03-13 ENCOUNTER — APPOINTMENT (OUTPATIENT)
Dept: CARDIOLOGY | Facility: CLINIC | Age: 35
End: 2025-03-13

## 2025-05-09 ENCOUNTER — APPOINTMENT (OUTPATIENT)
Dept: ELECTROPHYSIOLOGY | Facility: CLINIC | Age: 35
End: 2025-05-09

## 2025-05-21 ENCOUNTER — NON-APPOINTMENT (OUTPATIENT)
Age: 35
End: 2025-05-21

## 2025-05-23 ENCOUNTER — APPOINTMENT (OUTPATIENT)
Dept: ELECTROPHYSIOLOGY | Facility: CLINIC | Age: 35
End: 2025-05-23
Payer: MEDICAID

## 2025-05-23 ENCOUNTER — NON-APPOINTMENT (OUTPATIENT)
Age: 35
End: 2025-05-23

## 2025-05-23 VITALS
HEIGHT: 62 IN | DIASTOLIC BLOOD PRESSURE: 97 MMHG | BODY MASS INDEX: 28.71 KG/M2 | WEIGHT: 156 LBS | SYSTOLIC BLOOD PRESSURE: 145 MMHG | HEART RATE: 71 BPM | OXYGEN SATURATION: 95 %

## 2025-05-23 DIAGNOSIS — Z95.810 PRESENCE OF AUTOMATIC (IMPLANTABLE) CARDIAC DEFIBRILLATOR: ICD-10-CM

## 2025-05-23 DIAGNOSIS — I42.9 CARDIOMYOPATHY, UNSPECIFIED: ICD-10-CM

## 2025-05-23 DIAGNOSIS — I48.91 UNSPECIFIED ATRIAL FIBRILLATION: ICD-10-CM

## 2025-05-23 PROCEDURE — 93289 INTERROG DEVICE EVAL HEART: CPT

## 2025-05-23 PROCEDURE — 99214 OFFICE O/P EST MOD 30 MIN: CPT | Mod: 25

## 2025-05-23 PROCEDURE — 93000 ELECTROCARDIOGRAM COMPLETE: CPT | Mod: 59

## 2025-08-06 ENCOUNTER — APPOINTMENT (OUTPATIENT)
Dept: CARDIOLOGY | Facility: CLINIC | Age: 35
End: 2025-08-06
Payer: MEDICAID

## 2025-08-06 ENCOUNTER — NON-APPOINTMENT (OUTPATIENT)
Age: 35
End: 2025-08-06

## 2025-08-06 VITALS
WEIGHT: 154 LBS | HEART RATE: 66 BPM | HEIGHT: 62 IN | OXYGEN SATURATION: 98 % | SYSTOLIC BLOOD PRESSURE: 130 MMHG | DIASTOLIC BLOOD PRESSURE: 70 MMHG | BODY MASS INDEX: 28.34 KG/M2

## 2025-08-06 DIAGNOSIS — I10 ESSENTIAL (PRIMARY) HYPERTENSION: ICD-10-CM

## 2025-08-06 DIAGNOSIS — I42.8 OTHER CARDIOMYOPATHIES: ICD-10-CM

## 2025-08-06 DIAGNOSIS — I63.9 CEREBRAL INFARCTION, UNSPECIFIED: ICD-10-CM

## 2025-08-06 LAB
CHOLEST SERPL-MCNC: 136 MG/DL
HCT VFR BLD CALC: 49.1 %
HDLC SERPL-MCNC: 52 MG/DL
HGB BLD-MCNC: 15.6 G/DL
LDLC SERPL-MCNC: 70 MG/DL
MCHC RBC-ENTMCNC: 27.3 PG
MCHC RBC-ENTMCNC: 31.8 G/DL
MCV RBC AUTO: 85.8 FL
NONHDLC SERPL-MCNC: 85 MG/DL
PLATELET # BLD AUTO: 212 K/UL
RBC # BLD: 5.72 M/UL
RBC # FLD: 13.3 %
TRIGL SERPL-MCNC: 71 MG/DL
WBC # FLD AUTO: 7.03 K/UL

## 2025-08-06 PROCEDURE — 99214 OFFICE O/P EST MOD 30 MIN: CPT | Mod: 25

## 2025-08-06 PROCEDURE — 93000 ELECTROCARDIOGRAM COMPLETE: CPT

## 2025-08-07 DIAGNOSIS — E87.5 HYPERKALEMIA: ICD-10-CM

## 2025-08-07 LAB
ALBUMIN SERPL ELPH-MCNC: 4.7 G/DL
ALP BLD-CCNC: 54 U/L
ALT SERPL-CCNC: 23 U/L
ANION GAP SERPL CALC-SCNC: 11 MMOL/L
AST SERPL-CCNC: 24 U/L
BILIRUB SERPL-MCNC: 0.9 MG/DL
BUN SERPL-MCNC: 14 MG/DL
CALCIUM SERPL-MCNC: 9.6 MG/DL
CHLORIDE SERPL-SCNC: 101 MMOL/L
CO2 SERPL-SCNC: 28 MMOL/L
CREAT SERPL-MCNC: 1.03 MG/DL
EGFRCR SERPLBLD CKD-EPI 2021: 97 ML/MIN/1.73M2
ESTIMATED AVERAGE GLUCOSE: 117 MG/DL
GLUCOSE SERPL-MCNC: 104 MG/DL
HBA1C MFR BLD HPLC: 5.7 %
POTASSIUM SERPL-SCNC: 5.5 MMOL/L
PROT SERPL-MCNC: 7.7 G/DL
SODIUM SERPL-SCNC: 140 MMOL/L

## 2025-08-07 RX ORDER — DAPAGLIFLOZIN 10 MG/1
10 TABLET, FILM COATED ORAL DAILY
Qty: 30 | Refills: 2 | Status: ACTIVE | COMMUNITY
Start: 2025-08-07 | End: 1900-01-01

## 2025-08-25 ENCOUNTER — APPOINTMENT (OUTPATIENT)
Dept: CARDIOLOGY | Facility: CLINIC | Age: 35
End: 2025-08-25